# Patient Record
Sex: MALE | Race: WHITE | NOT HISPANIC OR LATINO | Employment: FULL TIME | URBAN - METROPOLITAN AREA
[De-identification: names, ages, dates, MRNs, and addresses within clinical notes are randomized per-mention and may not be internally consistent; named-entity substitution may affect disease eponyms.]

---

## 2017-01-19 ENCOUNTER — GENERIC CONVERSION - ENCOUNTER (OUTPATIENT)
Dept: OTHER | Facility: OTHER | Age: 37
End: 2017-01-19

## 2017-01-25 ENCOUNTER — GENERIC CONVERSION - ENCOUNTER (OUTPATIENT)
Dept: OTHER | Facility: OTHER | Age: 37
End: 2017-01-25

## 2017-02-24 ENCOUNTER — GENERIC CONVERSION - ENCOUNTER (OUTPATIENT)
Dept: OTHER | Facility: OTHER | Age: 37
End: 2017-02-24

## 2018-01-12 NOTE — MISCELLANEOUS
Message  Return to work or school:   Guevara Hawthorne is under my professional care  He was seen in my office on 01/28/2016       Excused for dates of 01/25/16, 01/27/16, 01/28/16          Signatures   Electronically signed by : lCayton Tamez, ; Jan 28 2016  2:08PM EST                       (/Recorder)

## 2018-01-12 NOTE — PROGRESS NOTES
Assessment    1  Well adult exam (V70 0) (Z00 00)   2  Allergic rhinitis (477 9) (J30 9)   3  Non morbid obesity due to excess calories (278 00) (E66 09)    Plan  Well adult exam    · Eat a low fat and low cholesterol diet ; Status:Complete;   Done: 81WJB1471 03:17PM   · Some eating tips that can help you lose weight ; Status:Complete;   Done: 34VZU8339  03:17PM    Discussion/Summary  Impression: health maintenance visit  Currently, he eats a poor diet and has an inadequate exercise regimen  Prostate cancer screening: the risks and benefits of prostate cancer screening were discussed and PSA is not indicated  Testicular cancer screening: the risks and benefits of testicular cancer screening were discussed and monthly self testicular exam was advised  Colorectal cancer screening: the risks and benefits of colorectal cancer screening were discussed and start colorectal screening at age 48  Screening lab work includes Patient had blood work in 1/14  The risks and benefits of immunizations were discussed and recommended Tdap vaccination  patient will check with insurance regarding coverage  Advice and education were given regarding nutrition, aerobic exercise, weight loss, calcium supplements, vitamin D supplements, cardiovascular risk reduction, self skin examination and seat belt use  Patient discussion: discussed with the patient  1 Well adult PE  2 Allergic rhinitis - symptoms are stable, cont  Singulair 10 mg at bedtime  3 Obesity - recommended to follow a low-fat, low-cholesterol diet  Start regular exercise  Encouraged weight reduction  Chief Complaint  Patient is here for a work physical exam       History of Present Illness  HM, Adult Male: The patient is being seen for a health maintenance evaluation  The last health maintenance visit was 2 year(s) ago  Social History: Household members include 1 daughter(s) and girlfrend  He is unmarried  Work status: working full time   The patient has never smoked cigarettes  He reports occasional alcohol use  He has never used illicit drugs  General Health: The patient's health since the last visit is described as good  He has regular dental visits  He denies vision problems  He denies hearing loss  Immunizations status: not up to date The patient needs the following immunization(s): Tdap  Lifestyle:  He does not have a healthy diet  He has weight concerns  Weight control issues: obesity  He does not exercise regularly  Reproductive health:  the patient is sexually active  Screening: Testicular cancer screening includes monthly self testicular examinations  Metabolic screening includes lipid profile performed 1/14 and glucose screening performed 1/14  risk screening reviewed and current  HPI: Patient presents for well adult physical exam      He has history of allergic rhinitis, currently takes Singular 10 mg at bedtime  Denies nasal congestion, PND  No SOB, no cough  Patient tries to lose weight  He does not exercise on a regular basis  Takes multivitamin one tablet daily  Blood work done in 1/14: BMP, lipid profile - WNL  Denies tobacco use  Review of Systems    Constitutional: no fever, no chills and not feeling tired  Wt  has been stable  Eyes: no eye pain, no eyesight problems, no dryness of the eyes, eyes not red, no purulent discharge from the eyes and no itching of the eyes  ENT: no earache, no nosebleeds, no sore throat, no hearing loss, no nasal discharge and no hoarseness  Cardiovascular: no chest pain, no intermittent leg claudication, no palpitations and no extremity edema  Respiratory: no shortness of breath, no cough, no wheezing and no shortness of breath during exertion  Gastrointestinal: no abdominal pain, no nausea, no vomiting, no constipation, no diarrhea and no blood in stools  Genitourinary: no dysuria, no incontinence, no nocturia and no testicular pain     Musculoskeletal: no arthralgias, no joint swelling, no myalgias and no joint stiffness  Integumentary: no rashes, no itching and no skin wound  Neurological: no headache, no numbness, no tingling, no dizziness, no convulsions and no fainting  Psychiatric: sleep disturbances, but no anxiety and no depression  Endocrine: no muscle weakness  Hematologic/Lymphatic: No complaints of swollen glands, no swollen glands in the neck, does not bleed easily, no easy bruising  Active Problems    1  Allergic rhinitis (477 9) (J30 9)   2  Blood pressure elevated (796 2) (I10)   3  Fatty liver (571 8) (K76 0)   4  Heartburn (787 1) (R12)   5  Insomnia (780 52) (G47 00)    Past Medical History    · History of Acute upper respiratory infection (465 9) (J06 9)   · History of Bronchospasm (519 11) (J98 01)   · History of acute bronchitis (V12 69) (Z87 09)   · History of acute sinusitis (V12 69) (Z87 09)   · History of acute sinusitis (V12 69) (Z87 09)   · History of influenza (V12 09) (Z87 09)   · History of viral gastroenteritis (V12 09) (Z86 19)   · History of viral gastroenteritis (V12 09) (Z86 19)   · History of viral infection (V12 09) (Z86 19)    Surgical History    · History of Myringotomy - With Ventilating Tube Insertion    Family History    · Family history of Diabetes (250 00) (E11 9)   · Family history of High blood pressure (401 9) (I10)    · Family history of alcoholism (V17 0) (Z81 1)    Social History    · Being A Social Drinker   · Never A Smoker    Current Meds   1  Melatonin 5 MG Oral Tablet; TAKE 1 TABLET Bedtime PRN; Therapy: 33ODS5182 to (Evaluate:50Pke3330); Last Rx:28Jan2016 Ordered   2  Montelukast Sodium 10 MG Oral Tablet; take 1 tablet by mouth every day; Therapy: 81RDT6910 to (Evaluate:00Wqm6075)  Requested for: 28XAZ8216; Last   Rx:29Sep2015 Ordered   3  Multivital Oral Tablet; take 1 tablet by mouth daily; Therapy: 20QLG5924 to Recorded   4   ProAir  (90 Base) MCG/ACT Inhalation Aerosol Solution; INHALE 1 TO 2 PUFFS   EVERY 4 TO 6 HOURS AS NEEDED; Therapy: 27MXM3358 to (Nuzhat Molina)  Requested for: 93QHD5198; Last   Rx:22Oqm0761 Ordered    Allergies    1  No Known Drug Allergies    Vitals   Recorded: 61BSM9911 02:54PM Recorded: 13EXH8593 02:27PM   Temperature  98 1 F   Heart Rate  89   Respiration  18   Systolic 344 613   Diastolic 82 80   Height  6 ft    Weight  248 lb    BMI Calculated  33 63   BSA Calculated  2 33   O2 Saturation  97   Pain Scale  0     Physical Exam    Constitutional   General appearance: No acute distress, well appearing and well nourished  Obese  Head and Face   Head and face: Normal     Palpation of the face and sinuses: No sinus tenderness  Eyes   Conjunctiva and lids: No erythema, swelling or discharge  Pupils and irises: Equal, round, reactive to light  Ears, Nose, Mouth, and Throat   External inspection of ears and nose: Normal     Otoscopic examination: Tympanic membranes translucent with normal light reflex  Canals patent without erythema  Hearing: Normal     Nasal mucosa, septum, and turbinates: Normal without edema or erythema  Lips, teeth, and gums: Abnormal   missing teeth  Oropharynx: Normal with no erythema, edema, exudate or lesions  Neck   Neck: Supple, symmetric, trachea midline, no masses  Pulmonary   Respiratory effort: No increased work of breathing or signs of respiratory distress  Auscultation of lungs: Clear to auscultation  Cardiovascular   Auscultation of heart: Normal rate and rhythm, normal S1 and S2, no murmurs  Carotid pulses: 2+ bilaterally  no carotid bruits  Abdominal aorta: Normal   no abdominal bruits  Examination of extremities for edema and/or varicosities: Normal     Abdomen   Abdomen: Non-tender, no masses  Liver and spleen: No hepatomegaly or splenomegaly  Lymphatic   Palpation of lymph nodes in neck: No lymphadenopathy      Musculoskeletal   Gait and station: Normal     Inspection/palpation of digits and nails: Normal without clubbing or cyanosis  Inspection/palpation of joints, bones, and muscles: Normal     Skin   Skin and subcutaneous tissue: Normal without rashes or lesions  Neurologic   Cranial nerves: Cranial nerves 2-12 intact  Sensation: No sensory loss      Psychiatric   Judgment and insight: Normal     Mood and affect: Normal        Signatures   Electronically signed by : LALITO Mcqueen ; Jan 29 2016  3:18PM EST                       (Author)

## 2018-01-13 NOTE — PROGRESS NOTES
Assessment    1  Viral gastroenteritis (008 8) (A08 4)   2  Insomnia (780 52) (G47 00)    Plan  Allergic rhinitis    · Montelukast Sodium 10 MG Oral Tablet; take 1 tablet by mouth every day  Bronchospasm    · ProAir  (90 Base) MCG/ACT Inhalation Aerosol Solution; INHALE 1  TO 2 PUFFS EVERY 4 TO 6 HOURS AS NEEDED  Insomnia    · Melatonin 5 MG Oral Tablet; TAKE 1 TABLET Bedtime PRN  Viral gastroenteritis    · Avoid dairy products ; Status:Complete;   Done: 91IKX4090   · Avoid foods and beverages that contain caffeine ; Status:Complete;   Done: 40BIG5281   · Drink at  least 6 glasses of clear liquids a day ; Status:Complete;   Done: 89ZCB5206   · Good handwashing is one of the best ways to control the spread of germs ;  Status:Complete;   Done: 48RCW2811   · We suggest that you follow a special diet until your diarrhea is better ; Status:Complete;    Done: 50LCW5138   · You may slowly resume your normal level of activity once you feel better ;  Status:Complete;   Done: 28EKD3850   · Seek Immediate Medical Attention if: You become dehydrated ; Status:Complete;   Done:  89ULN7985   · Seek Immediate Medical Attention if: You faint or lose consciousness ; Status:Complete;    Done: 60WOM3209   · Seek Immediate Medical Attention if: You have pain in your abdomen ; Status:Complete;    Done: 53FKN4717   · Seek Immediate Medical Attention if: You see any blood in the stool ; Status:Complete;    Done: 04EFS3692    Discussion/Summary    Viral gastroenteritis: This seems to have resolved  He should try to follow a BRATTY diet for the next few days  Avoid spicy/fried foods, caffeine, dairy products  Lots of fluids and rest encouraged  He will let us know if any diarrhea and/or n/v return  Insomnia: Try to practice proper bedtime habits  Limit use of TV/phone right before bed  Make sure room is cool and dark  Avoid caffeine and alcohol before bed  Don't exercise before bed   Try relaxing activities before bed such as reading  Get out of bed if unable to fall asleep within 20 minutes  May try increasing Melatonin to 5mg nightly  Will let us know if he continues to have issues with sleeping  At the end of the appointment, the patient mentioned that he also needs a routine physical  He is going to try and schedule that for next week with Dr Manuel Treviño who he normally sees  Possible side effects of new medications were reviewed with the patient/guardian today  The treatment plan was reviewed with the patient/guardian  The patient/guardian understands and agrees with the treatment plan      Chief Complaint  Patient is here complaining of diarrhea, nausea, no vomiting, left side stomach pain that started on last Sunday  History of Present Illness  HPI: C/o diarrhea, minor nausea and vomiting for past 3 days  Has since resolved as of yesterday  Last episode of diarrhea was yesterday morning  Is feeling better overall  Has diarrhea about about 4 times per day  Feels a little weak and slightly dehydrated now, but is improving  Is eating a regular diet  Denies fevers or chills  Denies abdominal pain, blood in stools  No sore throat, cough, nasal congestion, ear pain  No SOB or wheezing  No recent travel  No raw, unwashed foods recently  No known sick contacts recently     Non-smoker  Insomnia- Has trouble falling asleep mostly but also some issues with waking up throughout night  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  Has been going on for the past few months  Tries to relax before bed, has tried melatonin 3mg with only minor relief  Review of Systems    Constitutional: as noted in HPI    ENT: as noted in HPI  Cardiovascular: no chest pain, no palpitations and no lower extremity edema  Respiratory: as noted in HPI  Gastrointestinal: as noted in HPI  Musculoskeletal: no arthralgias and no myalgias  Integumentary: no rashes  Neurological: no headache       ROS reviewed  Active Problems    1  Acute sinusitis (461 9) (J01 90)   2  Allergic rhinitis (477 9) (J30 9)   3  Blood pressure elevated (796 2) (I10)   4  Bronchospasm (519 11) (J98 01)   5  Fatty liver (571 8) (K76 0)   6  Heartburn (787 1) (R12)    Past Medical History    1  History of Acute upper respiratory infection (465 9) (J06 9)   2  History of acute bronchitis (V12 69) (Z87 09)   3  History of acute sinusitis (V12 69) (Z87 09)   4  History of influenza (V12 09) (Z87 09)   5  History of viral gastroenteritis (V12 09) (Z86 19)   6  History of viral infection (V12 09) (Z86 19)  Active Problems And Past Medical History Reviewed: The active problems and past medical history were reviewed and updated today  Family History    1  Family history of Diabetes (250 00) (E11 9)   2  Family history of High blood pressure (401 9) (I10)    3  Family history of alcoholism (V17 0) (Z81 1)    Social History    · Being A Social Drinker   · Never A Smoker  The social history was reviewed and updated today  The social history was reviewed and is unchanged  Surgical History    1  History of Myringotomy - With Ventilating Tube Insertion    Current Meds   1  Montelukast Sodium 10 MG Oral Tablet; take 1 tablet by mouth every day; Therapy: 88EQA7562 to (Evaluate:97Cvq8421)  Requested for: 77ZEM5686; Last   Rx:72Abj1992 Ordered   2  ProAir  (90 Base) MCG/ACT Inhalation Aerosol Solution; INHALE 1 TO 2 PUFFS   EVERY 4 TO 6 HOURS AS NEEDED; Therapy: 19BTQ7107 to (Evaluate:59Juo8761)  Requested for: 22BVL4012; Last   Rx:56Dqj7059 Ordered    The medication list was reviewed and updated today  Allergies    1   No Known Drug Allergies    Vitals   Recorded: 26MJA8021 01:34PM   Temperature 98 3 F   Heart Rate 85   Respiration 16   Systolic 866   Diastolic 80   Height 6 ft    Weight 247 lb 6 08 oz   BMI Calculated 33 55   BSA Calculated 2 33   O2 Saturation 96   Pain Scale 4     Physical Exam    Constitutional General appearance: No acute distress, well appearing and well nourished  Eyes   Conjunctiva and lids: No swelling, erythema, or discharge  Conjunctiva Findings: normal in both eyes  Pupils and irises: Equal, round and reactive to light  Ears, Nose, Mouth, and Throat   External inspection of ears and nose: Normal     Otoscopic examination: Tympanic membrance translucent with normal light reflex  Canals patent without erythema  Nasal mucosa, septum, and turbinates: Normal without edema or erythema  Oropharynx: Normal with no erythema, edema, exudate or lesions  The tongue was normal  Oral membrane pink and moist    Pulmonary   Respiratory effort: No increased work of breathing or signs of respiratory distress  Auscultation of lungs: Clear to auscultation, equal breath sounds bilaterally, no wheezes, no rales, no rhonci  Cardiovascular   Auscultation of heart: Normal rate and rhythm, normal S1 and S2, without murmurs  Examination of extremities for edema and/or varicosities: Normal     Carotid pulses: Normal     Abdomen   Abdomen: Non-tender, no masses  The abdomen was rounded  Bowel sounds were normal  The abdomen was soft and nontender  The abdomen was not firm and not rigid  No rebound tenderness  No guarding  There was a negative Tanner's sign, negative Rovsing's sign, negative obturator sign and negative psoas sign  no masses palpated  The abdomen was normal to percussion  no CVA tenderness   Liver and spleen: No hepatomegaly or splenomegaly  Lymphatic   Palpation of lymph nodes in neck: No lymphadenopathy  Musculoskeletal   Digits and nails: Normal without clubbing or cyanosis  Skin   Skin and subcutaneous tissue: Normal without rashes or lesions  Normal skin turgor, no tenting  Attending Note  Collaborating Physician Note: Collaborating Note: I agree with the Advanced Practitioner note        Future Appointments    Date/Time Provider Specialty Site   01/29/2016 02:30 PM LALITO Whiting  Family Medicine [facility]     Signatures   Electronically signed by : FITO Ernandez; Jan 28 2016  2:22PM EST                       (Author)    Electronically signed by :  Trini Olivarez MD; Jan 28 2016  4:53PM EST                       (Author)

## 2018-01-15 ENCOUNTER — HOSPITAL ENCOUNTER (EMERGENCY)
Facility: HOSPITAL | Age: 38
Discharge: HOME | End: 2018-01-16
Attending: EMERGENCY MEDICINE | Admitting: EMERGENCY MEDICINE

## 2018-01-15 ENCOUNTER — APPOINTMENT (EMERGENCY)
Dept: RADIOLOGY | Facility: HOSPITAL | Age: 38
End: 2018-01-15

## 2018-01-15 DIAGNOSIS — R79.89 ELEVATED LFTS: ICD-10-CM

## 2018-01-15 DIAGNOSIS — K21.9 GERD (GASTROESOPHAGEAL REFLUX DISEASE): Primary | ICD-10-CM

## 2018-01-15 DIAGNOSIS — R73.9 ELEVATED BLOOD SUGAR: ICD-10-CM

## 2018-01-15 LAB
ALBUMIN SERPL BCP-MCNC: 3.9 G/DL (ref 3.5–5)
ALP SERPL-CCNC: 70 U/L (ref 46–116)
ALT SERPL W P-5'-P-CCNC: 131 U/L (ref 12–78)
ANION GAP SERPL CALCULATED.3IONS-SCNC: 8 MMOL/L (ref 4–13)
AST SERPL W P-5'-P-CCNC: 57 U/L (ref 5–45)
BASOPHILS # BLD AUTO: 0.03 THOUSANDS/ΜL (ref 0–0.1)
BASOPHILS NFR BLD AUTO: 0 % (ref 0–1)
BILIRUB SERPL-MCNC: 0.62 MG/DL (ref 0.2–1)
BUN SERPL-MCNC: 16 MG/DL (ref 5–25)
CALCIUM SERPL-MCNC: 9.1 MG/DL (ref 8.3–10.1)
CHLORIDE SERPL-SCNC: 103 MMOL/L (ref 100–108)
CO2 SERPL-SCNC: 25 MMOL/L (ref 21–32)
CREAT SERPL-MCNC: 0.8 MG/DL (ref 0.6–1.3)
DEPRECATED D DIMER PPP: <220 NG/ML (FEU) (ref 0–424)
EOSINOPHIL # BLD AUTO: 0.1 THOUSAND/ΜL (ref 0–0.61)
EOSINOPHIL NFR BLD AUTO: 1 % (ref 0–6)
ERYTHROCYTE [DISTWIDTH] IN BLOOD BY AUTOMATED COUNT: 13 % (ref 11.6–15.1)
GFR SERPL CREATININE-BSD FRML MDRD: 113 ML/MIN/1.73SQ M
GLUCOSE SERPL-MCNC: 143 MG/DL (ref 65–140)
HCT VFR BLD AUTO: 42.5 % (ref 36.5–49.3)
HGB BLD-MCNC: 15.3 G/DL (ref 12–17)
LYMPHOCYTES # BLD AUTO: 2.9 THOUSANDS/ΜL (ref 0.6–4.47)
LYMPHOCYTES NFR BLD AUTO: 30 % (ref 14–44)
MCH RBC QN AUTO: 29.5 PG (ref 26.8–34.3)
MCHC RBC AUTO-ENTMCNC: 36 G/DL (ref 31.4–37.4)
MCV RBC AUTO: 82 FL (ref 82–98)
MONOCYTES # BLD AUTO: 0.88 THOUSAND/ΜL (ref 0.17–1.22)
MONOCYTES NFR BLD AUTO: 9 % (ref 4–12)
NEUTROPHILS # BLD AUTO: 5.87 THOUSANDS/ΜL (ref 1.85–7.62)
NEUTS SEG NFR BLD AUTO: 60 % (ref 43–75)
NRBC BLD AUTO-RTO: 0 /100 WBCS
PLATELET # BLD AUTO: 271 THOUSANDS/UL (ref 149–390)
PMV BLD AUTO: 10.6 FL (ref 8.9–12.7)
POTASSIUM SERPL-SCNC: 3.5 MMOL/L (ref 3.5–5.3)
PROT SERPL-MCNC: 8.3 G/DL (ref 6.4–8.2)
RBC # BLD AUTO: 5.18 MILLION/UL (ref 3.88–5.62)
SODIUM SERPL-SCNC: 136 MMOL/L (ref 136–145)
WBC # BLD AUTO: 9.79 THOUSAND/UL (ref 4.31–10.16)

## 2018-01-15 PROCEDURE — 36415 COLL VENOUS BLD VENIPUNCTURE: CPT | Performed by: EMERGENCY MEDICINE

## 2018-01-15 PROCEDURE — 85025 COMPLETE CBC W/AUTO DIFF WBC: CPT | Performed by: EMERGENCY MEDICINE

## 2018-01-15 PROCEDURE — 80053 COMPREHEN METABOLIC PANEL: CPT | Performed by: EMERGENCY MEDICINE

## 2018-01-15 PROCEDURE — 85379 FIBRIN DEGRADATION QUANT: CPT | Performed by: EMERGENCY MEDICINE

## 2018-01-15 PROCEDURE — 71046 X-RAY EXAM CHEST 2 VIEWS: CPT

## 2018-01-15 RX ORDER — MAGNESIUM HYDROXIDE/ALUMINUM HYDROXICE/SIMETHICONE 120; 1200; 1200 MG/30ML; MG/30ML; MG/30ML
30 SUSPENSION ORAL ONCE
Status: COMPLETED | OUTPATIENT
Start: 2018-01-15 | End: 2018-01-15

## 2018-01-15 RX ORDER — FAMOTIDINE 20 MG/1
20 TABLET, FILM COATED ORAL 2 TIMES DAILY
Status: DISCONTINUED | OUTPATIENT
Start: 2018-01-16 | End: 2018-01-15

## 2018-01-15 RX ORDER — ALBUTEROL SULFATE 90 UG/1
2 AEROSOL, METERED RESPIRATORY (INHALATION) EVERY 6 HOURS PRN
COMMUNITY
End: 2021-08-14 | Stop reason: ALTCHOICE

## 2018-01-15 RX ORDER — DIPHENOXYLATE HYDROCHLORIDE AND ATROPINE SULFATE 2.5; .025 MG/1; MG/1
1 TABLET ORAL DAILY
COMMUNITY

## 2018-01-15 RX ORDER — FAMOTIDINE 20 MG/1
20 TABLET, FILM COATED ORAL 2 TIMES DAILY
Qty: 30 TABLET | Refills: 0 | Status: SHIPPED | OUTPATIENT
Start: 2018-01-15 | End: 2019-04-08

## 2018-01-15 RX ADMIN — ALUMINUM HYDROXIDE, MAGNESIUM HYDROXIDE, AND SIMETHICONE 30 ML: 200; 200; 20 SUSPENSION ORAL at 22:10

## 2018-01-15 RX ADMIN — LIDOCAINE HYDROCHLORIDE 15 ML: 20 SOLUTION ORAL; TOPICAL at 22:10

## 2018-01-16 VITALS
DIASTOLIC BLOOD PRESSURE: 92 MMHG | WEIGHT: 255 LBS | OXYGEN SATURATION: 98 % | BODY MASS INDEX: 34.58 KG/M2 | SYSTOLIC BLOOD PRESSURE: 143 MMHG | HEART RATE: 96 BPM | TEMPERATURE: 98.6 F | RESPIRATION RATE: 16 BRPM

## 2018-01-16 PROCEDURE — 99285 EMERGENCY DEPT VISIT HI MDM: CPT

## 2018-01-16 NOTE — ED ATTENDING ATTESTATION
Mindy Magana DO, saw and evaluated the patient  I have discussed the patient with the resident/non-physician practitioner and agree with the resident's/non-physician practitioner's findings, Plan of Care, and MDM as documented in the resident's/non-physician practitioner's note, except where noted  All available labs and Radiology studies were reviewed  At this point I agree with the current assessment done in the Emergency Department  I have conducted an independent evaluation of this patient a history and physical is as follows:    Patient complains of subjective fever, sore throat, dry cough, myalgias, arthralgias, general malaise  He describes feeling short of breath when he is coughing due to a sensation of mucus in his throat  He had 1 day of nausea and vomiting with chills that resolved spontaneously  No recent travel or  sick contacts w/similar symptoms  ROS: Denies HA, neck pain/stiffness, CP, abdominal pain, diarrhea or dysuria  12 system ROS o/w negative  PE: NAD, appears comfortable, alert; PERRL, EOMI; MMM, no posterior oropharyngeal exudate, edema or erythema; HRR, mildly tachycardic, no murmur; lungs CTA w/o w/r/r, POx 98% on RA (nl); abdomen s/nt/nd, nl BS in all 4 quadrants; (-) LE edema, FROM extremities x4; skin p/w/d  DDx:  Cough/chills -  viral syndrome, flu, less likely DVT/PE, doubt ACS/MI, pneumonia  A/P: Will check CBC, CMP and D-dimer, CXR, treat symptoms, re-evaluate for further workup and disposition      Critical Care Time  CritCare Time    Procedures

## 2018-01-16 NOTE — ED CARE HANDOFF
Emergency Department Sign Out Note        Sign out and transfer of care from Dr Lisa Raymond  See Separate Emergency Department note  The patient, Michele Galo, was evaluated by the previous provider for cough, sob, and dry heaving  Workup Completed:  CBC, CMP, C-Cimer, CXR      ED Course as of Dennys 16 0000   Mon Dennys 15, 2018   2232 Received the patient in sign out from Dr Beltran Aw  He reports that the patient presented with a cough, shortness of breath, and dry heaves  Workup so far has been negative, patient was given a GI cocktail and a D-dimer is pending  2316 F/U GI cocktail, pt unsure if it helped but says he has not had another episode since taking it  He described his episodes as having a sensation of gagging, and then coughing on saliva, followed by dry heaving  He says this can be brought on by pushing in his epigastric area  2358 No recurrence of sx  HR decreased to 97 at time of discharge  Pt reports hx of fatty liver and elevated BS in the past     2359 D-DIMER QUANTITATIVE: <220     Procedures  MDM  CritCare Time      Disposition  Final diagnoses:   GERD (gastroesophageal reflux disease)   Elevated LFTs   Elevated blood sugar     Time reflects when diagnosis was documented in both MDM as applicable and the Disposition within this note     Time User Action Codes Description Comment    1/15/2018 11:18 PM Cortes Weiner [K21 9] GERD (gastroesophageal reflux disease)     1/15/2018 11:18 PM Brittni Petit Add [R79 89] Elevated LFTs     1/15/2018 11:19 PM Argenis Weiner Add [R73 9] Elevated blood sugar       ED Disposition     ED Disposition Condition Comment    Discharge  Michele Galo discharge to home/self care      Condition at discharge: Good        Follow-up Information     Follow up With Specialties Details Why Contact Info    [facility] [address]       [facility]   [address] [address] [phone] Patient's Medications   Discharge Prescriptions    FAMOTIDINE (PEPCID) 20 MG TABLET    Take 1 tablet by mouth 2 (two) times a day       Start Date: 1/15/2018 End Date: --       Order Dose: 20 mg       Quantity: 30 tablet    Refills: 0     No discharge procedures on file         ED Provider  Electronically Signed by     Chaz Vela MD  01/16/18 0001

## 2018-01-16 NOTE — ED PROVIDER NOTES
History  Chief Complaint   Patient presents with    Shortness of Breath     pt with some shortness of breath thinks that maybe he just had the flu and still has cough and shortness of breath feeling and some MUSE symptoms      Patient is a 43 yo M with pmhx of GERD and asthma who presents for cough and SOB  Patient says that on Thursday he began to have nausea, vomiting, fevers and chills  He says that he was unable to hold anything down  He says that those symptoms have since improved, however, he has now developed a dry cough and feeling in his throat as if "there is a lid in his throat" preventing him from taking deep breaths  He says that he feels like he has "mucus in his throat" and he cant take deep breaths and develops "dry heaves " He admits to some chest tightness that is not pleuritic in nature  He also says that he has been having increasing epigastric burning discomfort and GERD-like symptoms over the past few days  He has been taking over the counter medications for it  Patient denies lightheadedness, dizziness, nasal congestion, sore throat, abdominal pain or urinary symptoms  Prior to Admission Medications   Prescriptions Last Dose Informant Patient Reported? Taking? albuterol (PROVENTIL HFA,VENTOLIN HFA) 90 mcg/act inhaler 1/15/2018 at 1700  Yes Yes   Sig: Inhale 2 puffs every 6 (six) hours as needed for wheezing   multivitamin (THERAGRAN) TABS 1/15/2018 at Unknown time  Yes Yes   Sig: Take 1 tablet by mouth daily      Facility-Administered Medications: None       Past Medical History:   Diagnosis Date    Asthma        History reviewed  No pertinent surgical history  History reviewed  No pertinent family history  I have reviewed and agree with the history as documented  Social History   Substance Use Topics    Smoking status: Never Smoker    Smokeless tobacco: Never Used    Alcohol use No        Review of Systems   Constitutional: Positive for fever   Negative for chills and unexpected weight change  HENT: Positive for voice change  Negative for congestion, sore throat and trouble swallowing  Eyes: Negative for pain, discharge and itching  Respiratory: Positive for cough, chest tightness and shortness of breath  Negative for wheezing  Cardiovascular: Negative for chest pain, palpitations and leg swelling  Gastrointestinal: Negative for abdominal pain, blood in stool, diarrhea, nausea and vomiting  Endocrine: Negative for polyuria  Genitourinary: Negative for difficulty urinating, dysuria, frequency and hematuria  Musculoskeletal: Negative for arthralgias and back pain  Skin: Negative for color change and rash  Neurological: Negative for dizziness, syncope, weakness, light-headedness and headaches  Physical Exam  ED Triage Vitals   Temperature Pulse Respirations Blood Pressure SpO2   01/15/18 1852 01/15/18 1852 01/15/18 1852 01/15/18 1852 01/15/18 1852   98 6 °F (37 °C) (!) 110 18 (!) 171/102 97 %      Temp Source Heart Rate Source Patient Position - Orthostatic VS BP Location FiO2 (%)   01/15/18 1852 01/15/18 2252 01/15/18 2252 01/15/18 2252 --   Tympanic Monitor Sitting Left arm       Pain Score       01/15/18 1852       8           Orthostatic Vital Signs  Vitals:    01/15/18 1852 01/15/18 2110 01/15/18 2252 01/16/18 0000   BP: (!) 171/102 (!) 178/95 (!) 172/105 143/92   Pulse: (!) 110 (!) 110 102 96   Patient Position - Orthostatic VS:   Sitting        Physical Exam   Constitutional: He is oriented to person, place, and time  He appears well-developed and well-nourished  No distress  HENT:   Head: Normocephalic and atraumatic  Eyes: Conjunctivae and EOM are normal  Pupils are equal, round, and reactive to light  Neck: Neck supple  Cardiovascular: Normal heart sounds  Tachycardia present  Exam reveals no gallop and no friction rub  No murmur heard  Pulmonary/Chest: Effort normal and breath sounds normal  No respiratory distress   He has no wheezes  He has no rales  Abdominal: Soft  He exhibits no mass  There is tenderness (epigastric )  There is no guarding  Musculoskeletal: He exhibits no edema or deformity  Lymphadenopathy:     He has no cervical adenopathy  Neurological: He is alert and oriented to person, place, and time  Skin: Skin is warm and dry  Psychiatric: He has a normal mood and affect  His behavior is normal    Nursing note and vitals reviewed  ED Medications  Medications   lidocaine viscous (XYLOCAINE) 2 % mucosal solution 15 mL (15 mL Swish & Spit Given 1/15/18 2210)   aluminum-magnesium hydroxide-simethicone (MYLANTA) 200-200-20 mg/5 mL oral suspension 30 mL (30 mL Oral Given 1/15/18 2210)       Diagnostic Studies  Results Reviewed     Procedure Component Value Units Date/Time    D-Dimer [[id number]]  (Normal) Collected:  01/15/18 2239    Lab Status:  Final result Specimen:  Blood from Arm, Right Updated:  01/15/18 2346     D-Dimer, Quant <220 ng/ml (FEU)     Comprehensive metabolic panel [[id number]]  (Abnormal) Collected:  01/15/18 2239    Lab Status:  Final result Specimen:  Blood from Arm, Right Updated:  01/15/18 2312     Sodium 136 mmol/L      Potassium 3 5 mmol/L      Chloride 103 mmol/L      CO2 25 mmol/L      Anion Gap 8 mmol/L      BUN 16 mg/dL      Creatinine 0 80 mg/dL      Glucose 143 (H) mg/dL      Calcium 9 1 mg/dL      AST 57 (H) U/L       (H) U/L      Alkaline Phosphatase 70 U/L      Total Protein 8 3 (H) g/dL      Albumin 3 9 g/dL      Total Bilirubin 0 62 mg/dL      eGFR 113 ml/min/1 73sq m     Narrative:         National Kidney Disease Education Program recommendations are as follows:  GFR calculation is accurate only with a steady state creatinine  Chronic Kidney disease less than 60 ml/min/1 73 sq  meters  Kidney failure less than 15 ml/min/1 73 sq  meters      CBC and differential [[id number]]  (Normal) Collected:  01/15/18 2239    Lab Status:  Final result Specimen:  Blood from Arm, Right Updated:  01/15/18 2257     WBC 9 79 Thousand/uL      RBC 5 18 Million/uL      Hemoglobin 15 3 g/dL      Hematocrit 42 5 %      MCV 82 fL      MCH 29 5 pg      MCHC 36 0 g/dL      RDW 13 0 %      MPV 10 6 fL      Platelets 047 Thousands/uL      nRBC 0 /100 WBCs      Neutrophils Relative 60 %      Lymphocytes Relative 30 %      Monocytes Relative 9 %      Eosinophils Relative 1 %      Basophils Relative 0 %      Neutrophils Absolute 5 87 Thousands/µL      Lymphocytes Absolute 2 90 Thousands/µL      Monocytes Absolute 0 88 Thousand/µL      Eosinophils Absolute 0 10 Thousand/µL      Basophils Absolute 0 03 Thousands/µL                  XR chest 2 views   Final Result by Matthias Ramos MD (01/15 2200)      No active pulmonary disease  Workstation performed: [id number]               Procedures  Procedures      Phone Consults  ED Phone Contact    ED Course  ED Course                                MDM  Number of Diagnoses or Management Options  Diagnosis management comments: 43 yo M w/ history of GERD and asthma presenting for cough and sob  No fevers, chills  No cardiac history  Non-wheezy on exam   No history of PE or current leg swelling  Patient tachycardic  Patient does not PERC out due to tachycardia  Epigastric discomfort and GERD symptoms along with dry cough    Plan: GI cocktail, CXR, CBC, CMP, D-dimer        CritCare Time    Disposition  Final diagnoses:   GERD (gastroesophageal reflux disease)   Elevated LFTs   Elevated blood sugar     Time reflects when diagnosis was documented in both MDM as applicable and the Disposition within this note     Time User Action Codes Description Comment    1/15/2018 11:18 PM Elle Weiner [K21 [id number]] GERD (gastroesophageal reflux disease)     1/15/2018 11:18 PM Lesley Powers [[id number]] Elevated LFTs     1/15/2018 11:19 PM Elle Weiner [[id number]] Elevated blood sugar       ED Disposition     ED Disposition Condition Comment    Discharge  Maggie Everett discharge to home/self care  Condition at discharge: Good        Follow-up Information     Follow up With Specialties Details Why [address] [facility]   [address]  [phone]        Discharge Medication List as of 1/15/2018 11:53 PM      START taking these medications    Details   famotidine (PEPCID) 20 mg tablet Take 1 tablet by mouth 2 (two) times a day, Starting Mon 1/15/2018, Print         CONTINUE these medications which have NOT CHANGED    Details   albuterol (PROVENTIL HFA,VENTOLIN HFA) 90 mcg/act inhaler Inhale 2 puffs every 6 (six) hours as needed for wheezing, Historical Med      multivitamin (THERAGRAN) TABS Take 1 tablet by mouth daily, Historical Med           No discharge procedures on file  ED Provider  Attending physically available and evaluated Joshluis alfredo Miguelpayton BISWAS managed the patient along with the ED Attending      Electronically Signed by         Nabila Frank DO  01/17/18 6719

## 2018-01-16 NOTE — DISCHARGE INSTRUCTIONS
Gastroesophageal Reflux Disease   WHAT YOU NEED TO KNOW:   Gastroesophageal reflux occurs when acid and food in the stomach back up into the esophagus  Gastroesophageal reflux disease (GERD) is reflux that occurs more than twice a week for a few weeks  It usually causes heartburn and other symptoms  GERD can cause other health problems over time if it is not treated  DISCHARGE INSTRUCTIONS:   Return to the emergency department if:   · You feel full and cannot burp or vomit  · You have severe chest pain and sudden trouble breathing  · Your bowel movements are black, bloody, or tarry-looking  · Your vomit looks like coffee grounds or has blood in it  Contact your healthcare provider if:   · You vomit large amounts, or you vomit often  · You have trouble breathing after you vomit  · You have trouble swallowing, or pain with swallowing  · You are losing weight without trying  · Your symptoms get worse or do not improve with treatment  · You have questions or concerns about your condition or care  Medicines:   · Medicines  are used to decrease stomach acid  Medicine may also be used to help your lower esophageal sphincter and stomach contract (tighten) more  · Take your medicine as directed  Contact your healthcare provider if you think your medicine is not helping or if you have side effects  Tell him of her if you are allergic to any medicine  Keep a list of the medicines, vitamins, and herbs you take  Include the amounts, and when and why you take them  Bring the list or the pill bottles to follow-up visits  Carry your medicine list with you in case of an emergency  Manage GERD:   · Do not have foods or drinks that may increase heartburn  These include chocolate, peppermint, fried or fatty foods, drinks that contain caffeine, or carbonated drinks (soda)  Other foods include spicy foods, onions, tomatoes, and tomato-based foods   Do not have foods or drinks that can irritate your esophagus, such as citrus fruits, juices, and alcohol  · Do not eat large meals  When you eat a lot of food at one time, your stomach needs more acid to digest it  Eat 6 small meals each day instead of 3 large ones, and eat slowly  Do not eat meals 2 to 3 hours before bedtime  · Elevate the head of your bed  Place 6-inch blocks under the head of your bed frame  You may also use more than one pillow under your head and shoulders while you sleep  · Maintain a healthy weight  If you are overweight, weight loss may help relieve symptoms of GERD  · Do not smoke  Smoking weakens the lower esophageal sphincter and increases the risk of GERD  Ask your healthcare provider for information if you currently smoke and need help to quit  E-cigarettes or smokeless tobacco still contain nicotine  Talk to your healthcare provider before you use these products  · Do not wear clothing that is tight around your waist   Tight clothing can put pressure on your stomach and cause or worsen GERD symptoms  Follow up with your healthcare provider as directed:  Write down your questions so you remember to ask them during your visits  © 2017 [employer] [facility] Information is for End User's use only and may not be sold, redistributed or otherwise used for commercial purposes  All illustrations and images included in CareNotes® are the copyrighted property of A D A M [employer]  or [employer]  The above information is an  only  It is not intended as medical advice for individual conditions or treatments  Talk to your doctor, nurse or pharmacist before following any medical regimen to see if it is safe and effective for you  Hyperglycemia, Non-Diabetic   WHAT YOU NEED TO KNOW:   Hyperglycemia is a blood glucose (sugar) level that is higher than normal  Hyperglycemia can be short-term, or it can become a long-term condition that leads to diabetes  DISCHARGE INSTRUCTIONS:   Medicines:   You may  need any of the following:  · Hypoglycemic medicine  helps to decrease the amount of sugar in your blood  This medicine helps your body move the sugar to your cells, where it is needed for energy  Your healthcare provider will tell you how often to take this medicine and how long to take it  · Insulin  helps to decrease blood sugar levels  You may need 1 or more shots of insulin each day  You or a family member will be taught how to give the insulin shots  Your healthcare provider will tell you how often you need to inject insulin each day  He will also tell you how long you will need to take it  · Take your medicine as directed  Contact your healthcare provider if you think your medicine is not helping or if you have side effects  Tell him of her if you are allergic to any medicine  Keep a list of the medicines, vitamins, and herbs you take  Include the amounts, and when and why you take them  Bring the list or the pill bottles to follow-up visits  Carry your medicine list with you in case of an emergency  Follow up with your healthcare provider as directed: You may need to return for more tests  Your healthcare provider may also need to refer you to a specialist, such as a dietitian  Write down your questions so you remember to ask them during your visits  Manage hyperglycemia:  The following may help keep your blood sugar at the level recommended by your healthcare provider:  · Get regular exercise  This can help to lower your blood sugar levels  It can also improve your heart health and help you stay at a healthy weight  Get at least 30 minutes of exercise 5 days each week  Ask your healthcare provider about the best exercise plan for you  · Lose weight if you are overweight  Even a small loss of 5% to 10% of your body weight can help to decrease your blood sugar levels  It can also improve your heart health  · Eat healthy foods    Include foods that are high in fiber, such as vegetables, fruits, whole grains, and beans  Also include foods that are low in fat, such as low-fat dairy (milk, yogurt, and cheese), fish, and lean meat  Limit foods that are high in calories and sugar, such as sweet desserts, potato chips, and candy  Limit foods that are high in sodium, such as table salt and salty foods  Your healthcare provider may suggest that you limit carbohydrates to lower your blood sugar levels  · Do not smoke  If you smoke, it is never too late to quit  Smoking can worsen the problems that can occur with hyperglycemia  Ask your healthcare provider for information if you need help quitting  · Limit alcohol  Women should limit alcohol to 1 drink a day  Men should limit alcohol to 2 drinks a day  A drink of alcohol is 12 ounces of beer, 5 ounces of wine, or 1½ ounces of liquor  How to check your blood sugar level: You may need to check your blood sugar level with a blood glucose meter  If you take insulin, you may need to check your blood sugar level at least 3 times each day  Ask your healthcare provider when and how often to check during the day  Ask what your blood sugar levels should be before and after you eat  You may need to check for ketones in your urine or blood if your blood sugar level is high  Write down your results and show them to your healthcare provider  Contact your healthcare provider if:   · You have a fever  · Your blood sugar levels continue to be higher than you were told they should be  · You continue to urinate more often than usual      · You continue to be more thirsty than usual      · You continue to have nausea and vomiting  · You have a wound that has signs of infection, such as redness, swelling, and pus  · You have questions or concerns about your condition or care  Return to the emergency department if:   · Your arm or leg feels warm, tender, and painful  It may look swollen and red      · You feel lightheaded, short of breath, and have chest pain  · You cough up blood  © 2017 [employer] [employer] Information is for End User's use only and may not be sold, redistributed or otherwise used for commercial purposes  All illustrations and images included in CareNotes® are the copyrighted property of A D A M , Inc  or [employer]  The above information is an  only  It is not intended as medical advice for individual conditions or treatments  Talk to your doctor, nurse or pharmacist before following any medical regimen to see if it is safe and effective for you

## 2018-08-03 ENCOUNTER — APPOINTMENT (OUTPATIENT)
Dept: RADIOLOGY | Age: 38
End: 2018-08-03
Payer: OTHER MISCELLANEOUS

## 2018-08-03 ENCOUNTER — TRANSCRIBE ORDERS (OUTPATIENT)
Dept: URGENT CARE | Age: 38
End: 2018-08-03

## 2018-08-03 ENCOUNTER — APPOINTMENT (OUTPATIENT)
Dept: URGENT CARE | Age: 38
End: 2018-08-03
Payer: OTHER MISCELLANEOUS

## 2018-08-03 DIAGNOSIS — S09.90XA INJURY OF HEAD, INITIAL ENCOUNTER: ICD-10-CM

## 2018-08-03 DIAGNOSIS — S09.90XA INJURY OF HEAD, INITIAL ENCOUNTER: Primary | ICD-10-CM

## 2018-08-03 PROCEDURE — 70030 X-RAY EYE FOR FOREIGN BODY: CPT

## 2018-08-03 PROCEDURE — G0382 LEV 3 HOSP TYPE B ED VISIT: HCPCS | Performed by: NURSE PRACTITIONER

## 2018-08-03 PROCEDURE — 99283 EMERGENCY DEPT VISIT LOW MDM: CPT | Performed by: NURSE PRACTITIONER

## 2019-02-24 ENCOUNTER — OFFICE VISIT (OUTPATIENT)
Dept: URGENT CARE | Age: 39
End: 2019-02-24
Payer: COMMERCIAL

## 2019-02-24 VITALS
WEIGHT: 260 LBS | RESPIRATION RATE: 18 BRPM | OXYGEN SATURATION: 97 % | HEART RATE: 88 BPM | DIASTOLIC BLOOD PRESSURE: 97 MMHG | BODY MASS INDEX: 35.26 KG/M2 | TEMPERATURE: 98.6 F | SYSTOLIC BLOOD PRESSURE: 141 MMHG

## 2019-02-24 DIAGNOSIS — J20.9 ACUTE BRONCHITIS, UNSPECIFIED ORGANISM: ICD-10-CM

## 2019-02-24 DIAGNOSIS — J45.901 ASTHMA WITH ACUTE EXACERBATION, UNSPECIFIED ASTHMA SEVERITY, UNSPECIFIED WHETHER PERSISTENT: Primary | ICD-10-CM

## 2019-02-24 DIAGNOSIS — J01.90 ACUTE SINUSITIS, RECURRENCE NOT SPECIFIED, UNSPECIFIED LOCATION: ICD-10-CM

## 2019-02-24 PROCEDURE — 99213 OFFICE O/P EST LOW 20 MIN: CPT | Performed by: PHYSICIAN ASSISTANT

## 2019-02-24 RX ORDER — BROMPHENIRAMINE MALEATE, PSEUDOEPHEDRINE HYDROCHLORIDE, AND DEXTROMETHORPHAN HYDROBROMIDE 2; 30; 10 MG/5ML; MG/5ML; MG/5ML
5 SYRUP ORAL 4 TIMES DAILY PRN
Qty: 120 ML | Refills: 0 | Status: SHIPPED | OUTPATIENT
Start: 2019-02-24 | End: 2019-04-08

## 2019-02-24 RX ORDER — ALBUTEROL SULFATE 90 UG/1
2 AEROSOL, METERED RESPIRATORY (INHALATION) EVERY 4 HOURS PRN
Qty: 1 INHALER | Refills: 0 | Status: SHIPPED | OUTPATIENT
Start: 2019-02-24 | End: 2019-04-08

## 2019-02-24 RX ORDER — PREDNISONE 20 MG/1
20 TABLET ORAL DAILY
Qty: 5 TABLET | Refills: 0 | Status: SHIPPED | OUTPATIENT
Start: 2019-02-24 | End: 2019-03-01

## 2019-02-24 RX ORDER — AMOXICILLIN 500 MG/1
500 CAPSULE ORAL EVERY 12 HOURS SCHEDULED
Qty: 14 CAPSULE | Refills: 0 | Status: SHIPPED | OUTPATIENT
Start: 2019-02-24 | End: 2019-03-03

## 2019-02-24 NOTE — PATIENT INSTRUCTIONS
Take Bromfed DM, prednisone and albuterol as prescribed  Hold amoxicillin until symptomatic for 10-14 days   Fluids and rest  Nasal saline spray  Tylenol/Ibuprofen for pain/fever  Warm compresses over sinuses  Steam treatment (utilize proper safety precautions when in contact with hot water/steam)  Follow up with PCP in 3-5 days  Proceed to  ER if symptoms worsen  Acute Bronchitis   WHAT YOU NEED TO KNOW:   Acute bronchitis is swelling and irritation in the air passages of your lungs  This irritation may cause you to cough or have other breathing problems  Acute bronchitis often starts because of another illness, such as a cold or the flu  The illness spreads from your nose and throat to your windpipe and airways  Bronchitis is often called a chest cold  Acute bronchitis lasts about 3 to 6 weeks and is usually not a serious illness  Your cough can last for several weeks  DISCHARGE INSTRUCTIONS:   Return to the emergency department if:   · You cough up blood  · Your lips or fingernails turn blue  · You feel like you are not getting enough air when you breathe  Contact your healthcare provider if:   · You have a fever  · Your breathing problems do not go away or get worse  · Your cough does not get better within 4 weeks  · You have questions or concerns about your condition or care  Self-care:   · Get more rest   Rest helps your body to heal  Slowly start to do more each day  Rest when you feel it is needed  · Avoid irritants in the air  Avoid chemicals, fumes, and dust  Wear a face mask if you must work around dust or fumes  Stay inside on days when air pollution levels are high  If you have allergies, stay inside when pollen counts are high  Do not use aerosol products, such as spray-on deodorant, bug spray, and hair spray  · Do not smoke or be around others who smoke  Nicotine and other chemicals in cigarettes and cigars damages the cilia that move mucus out of your lungs   Ask your healthcare provider for information if you currently smoke and need help to quit  E-cigarettes or smokeless tobacco still contain nicotine  Talk to your healthcare provider before you use these products  · Drink liquids as directed  Liquids help keep your air passages moist and help you cough up mucus  You may need to drink more liquids when you have acute bronchitis  Ask how much liquid to drink each day and which liquids are best for you  · Use a humidifier or vaporizer  Use a cool mist humidifier or a vaporizer to increase air moisture in your home  This may make it easier for you to breathe and help decrease your cough  Decrease risk for acute bronchitis:   · Get the vaccinations you need  Ask your healthcare provider if you should get vaccinated against the flu or pneumonia  · Prevent the spread of germs  You can decrease your risk of acute bronchitis and other illnesses by doing the following:     [facility] your hands often with soap and water  Carry germ-killing hand lotion or gel with you  You can use the lotion or gel to clean your hands when soap and water are not available  ¨ Do not touch your eyes, nose, or mouth unless you have washed your hands first     ¨ Always cover your mouth when you cough to prevent the spread of germs  It is best to cough into a tissue or your shirt sleeve instead of into your hand  Ask those around you cover their mouths when they cough  ¨ Try to avoid people who have a cold or the flu  If you are sick, stay away from others as much as possible  Medicines: Your healthcare provider may  give you any of the following:  · Ibuprofen or acetaminophen  are medicines that help lower your fever  They are available without a doctor's order  Ask your healthcare provider which medicine is right for you  Ask how much to take and how often to take it  Follow directions  These medicines can cause stomach bleeding if not taken correctly  Ibuprofen can cause kidney damage  Do not take ibuprofen if you have kidney disease, an ulcer, or allergies to aspirin  Acetaminophen can cause liver damage  Do not take more than 4,000 milligrams in 24 hours  · Decongestants  help loosen mucus in your lungs and make it easier to cough up  This can help you breathe easier  · Cough suppressants  decrease your urge to cough  If your cough produces mucus, do not take a cough suppressant unless your healthcare provider tells you to  Your healthcare provider may suggest that you take a cough suppressant at night so you can rest     · Inhalers  may be given  Your healthcare provider may give you one or more inhalers to help you breathe easier and cough less  An inhaler gives your medicine to open your airways  Ask your healthcare provider to show you how to use your inhaler correctly  · Take your medicine as directed  Contact your healthcare provider if you think your medicine is not helping or if you have side effects  Tell him of her if you are allergic to any medicine  Keep a list of the medicines, vitamins, and herbs you take  Include the amounts, and when and why you take them  Bring the list or the pill bottles to follow-up visits  Carry your medicine list with you in case of an emergency  Follow up with your healthcare provider as directed:  Write down questions you have so you will remember to ask them during your follow-up visits  © 2017 [employer] [employer] Information is for End User's use only and may not be sold, redistributed or otherwise used for commercial purposes  All illustrations and images included in CareNotes® are the copyrighted property of [employer]  or [employer]  The above information is an  only  It is not intended as medical advice for individual conditions or treatments  Talk to your doctor, nurse or pharmacist before following any medical regimen to see if it is safe and effective for you

## 2019-02-24 NOTE — PROGRESS NOTES
[facility]        NAME: Miriam Joy is a 43 y o  male  : 1980    MRN: [id number]  DATE: 2019  TIME: 12:16 PM    Assessment and Plan   Asthma with acute exacerbation, unspecified asthma severity, unspecified whether persistent [J45 901]  1  Asthma with acute exacerbation, unspecified asthma severity, unspecified whether persistent  albuterol (VENTOLIN HFA) 90 mcg/act inhaler    predniSONE 20 mg tablet   2  Acute bronchitis, unspecified organism  brompheniramine-pseudoephedrine-DM 30-2-10 MG/5ML syrup   3  Acute sinusitis, recurrence not specified, unspecified location  amoxicillin (AMOXIL) 500 mg capsule         Patient Instructions     Take Bromfed DM, prednisone and albuterol as prescribed  Hold amoxicillin until symptomatic for 10-14 days   Fluids and rest  Nasal saline spray  Tylenol/Ibuprofen for pain/fever  Warm compresses over sinuses  Steam treatment (utilize proper safety precautions when in contact with hot water/steam)  Follow up with PCP in 3-5 days  Proceed to  ER if symptoms worsen  Chief Complaint     Chief Complaint   Patient presents with    Cough     sinus pressure; chest congestion; ear fullness; mucinex         History of Present Illness       Cough   This is a new problem  The current episode started in the past 7 days  The problem has been unchanged  The problem occurs every few minutes  Associated symptoms include chills, ear pain, headaches, shortness of breath and wheezing  Pertinent negatives include no chest pain, fever, myalgias, postnasal drip, rash, rhinorrhea or sore throat  Treatments tried: mucinex; Tylenol cold  His past medical history is significant for asthma  There is no history of bronchitis or pneumonia  Review of Systems   Review of Systems   Constitutional: Positive for chills  Negative for activity change, appetite change and fever  HENT: Positive for congestion, ear pain and sinus pressure   Negative for dental problem, ear discharge, facial swelling, postnasal drip, rhinorrhea, sinus pain, sneezing, sore throat and trouble swallowing  Eyes: Negative for itching  Respiratory: Positive for cough (dry), chest tightness, shortness of breath and wheezing  Cardiovascular: Negative for chest pain and palpitations  Gastrointestinal: Negative for abdominal pain, constipation, diarrhea, nausea and vomiting  Musculoskeletal: Negative for myalgias  Skin: Negative for rash  Neurological: Positive for headaches  Negative for dizziness, weakness and light-headedness           Current Medications       Current Outpatient Medications:     albuterol (PROVENTIL HFA,VENTOLIN HFA) 90 mcg/act inhaler, Inhale 2 puffs every 6 (six) hours as needed for wheezing, Disp: , Rfl:     albuterol (VENTOLIN HFA) 90 mcg/act inhaler, Inhale 2 puffs every 4 (four) hours as needed for wheezing or shortness of breath, Disp: 1 Inhaler, Rfl: 0    amoxicillin (AMOXIL) 500 mg capsule, Take 1 capsule (500 mg total) by mouth every 12 (twelve) hours for 7 days, Disp: 14 capsule, Rfl: 0    brompheniramine-pseudoephedrine-DM 30-2-10 MG/5ML syrup, Take 5 mL by mouth 4 (four) times a day as needed for congestion or cough, Disp: 120 mL, Rfl: 0    famotidine (PEPCID) 20 mg tablet, Take 1 tablet by mouth 2 (two) times a day (Patient not taking: Reported on 2/24/2019), Disp: 30 tablet, Rfl: 0    multivitamin (THERAGRAN) TABS, Take 1 tablet by mouth daily, Disp: , Rfl:     predniSONE 20 mg tablet, Take 1 tablet (20 mg total) by mouth daily for 5 days, Disp: 5 tablet, Rfl: 0    Current Allergies     Allergies as of 02/24/2019    (No Known Allergies)            The following portions of the patient's history were reviewed and updated as appropriate: allergies, current medications, past family history, past medical history, past social history, past surgical history and problem list      Past Medical History:   Diagnosis Date    Asthma        No past surgical history on file  No family history on file  Medications have been verified  Objective   /97   Pulse 88   Temp 98 6 °F (37 °C)   Resp 18   Wt 118 kg (260 lb)   SpO2 97%   BMI 35 26 kg/m²        Physical Exam     Physical Exam   Constitutional: He is oriented to person, place, and time  He appears well-developed and well-nourished  No distress  HENT:   Head: Normocephalic  Right Ear: External ear normal    Left Ear: External ear normal    Nose: Nose normal    Mouth/Throat: Oropharynx is clear and moist  No oropharyngeal exudate  No sinus TTP  Eyes: Conjunctivae are normal    Cardiovascular: Normal rate, regular rhythm, normal heart sounds and intact distal pulses  Exam reveals no gallop and no friction rub  No murmur heard  Pulmonary/Chest: Effort normal and breath sounds normal  No respiratory distress  He has no wheezes  He has no rales  He exhibits no tenderness  Lymphadenopathy:     He has no cervical adenopathy  Neurological: He is alert and oriented to person, place, and time  Skin: Skin is warm  He is not diaphoretic  Psychiatric: He has a normal mood and affect  His behavior is normal  Judgment and thought content normal    Vitals reviewed

## 2019-03-22 ENCOUNTER — OFFICE VISIT (OUTPATIENT)
Dept: URGENT CARE | Age: 39
End: 2019-03-22
Payer: COMMERCIAL

## 2019-03-22 VITALS
OXYGEN SATURATION: 95 % | HEIGHT: 74 IN | SYSTOLIC BLOOD PRESSURE: 142 MMHG | TEMPERATURE: 98 F | BODY MASS INDEX: 32.08 KG/M2 | WEIGHT: 250 LBS | RESPIRATION RATE: 16 BRPM | HEART RATE: 78 BPM | DIASTOLIC BLOOD PRESSURE: 91 MMHG

## 2019-03-22 DIAGNOSIS — J01.90 ACUTE BACTERIAL RHINOSINUSITIS: Primary | ICD-10-CM

## 2019-03-22 DIAGNOSIS — B96.89 ACUTE BACTERIAL RHINOSINUSITIS: Primary | ICD-10-CM

## 2019-03-22 PROCEDURE — 99213 OFFICE O/P EST LOW 20 MIN: CPT | Performed by: PHYSICIAN ASSISTANT

## 2019-03-22 RX ORDER — DOXYCYCLINE 100 MG/1
100 TABLET ORAL 2 TIMES DAILY
Qty: 14 TABLET | Refills: 0 | Status: SHIPPED | OUTPATIENT
Start: 2019-03-22 | End: 2019-03-29

## 2019-03-22 RX ORDER — PREDNISONE 50 MG/1
50 TABLET ORAL DAILY
Qty: 5 TABLET | Refills: 0 | Status: SHIPPED | OUTPATIENT
Start: 2019-03-22 | End: 2019-04-08

## 2019-03-22 NOTE — PROGRESS NOTES
[facility]        NAME: Jackie Mack is a 43 y o  male  : 1980    MRN: [id number]  DATE: 2019  TIME: 2:06 PM    Assessment and Plan   Acute bacterial rhinosinusitis [J01 90, B96 89]  1  Acute bacterial rhinosinusitis  doxycycline (ADOXA) 100 MG tablet    predniSONE 50 mg tablet       Patient Instructions   Take steroid as directed with food and water  While on this medication do not take any NSAIDs including ibuprofen (Advil), naproxen (Aleve), etc   Avoid caffeinated beverages while taking this medication  Take the antibiotic as directed with food and water  Take a probiotic while taking this medication  Continue to use your inhalers as previously directed and take Mucinex, as directed  Saltwater gargles, warm tea with honey, and throat lozenges may be relieving of throat discomfort  Use a cool mist humidifier at bedtime, turning on hours prior to bed with your bedroom doors shut for maximum relief  Follow up with your family doctor in 3-5 days  Proceed to the ER if symptoms worsen  The diagnosis, etiology, expected course of illness, and treatment plan were reviewed  All questions answered  Precautions given  Patient verbalized understanding and agreement the treatment plan  Chief Complaint     Chief Complaint   Patient presents with    Cold Like Symptoms     Pt c/o cold sx for 10 days  Pt states, "It doesn't seem to be going away "     History of Present Illness     42 y/o male presenting with c/o cold sx x 10 days  He notes NC, intermittent runny nose, sore throat, chest tightness, occasionally productive cough, and SOB  He has attempted treating with Mucinex Max strength, albuterol inhaler, and motrin  Mucinex does help to increase cough production, however, is otherwise not relieving of symptoms  He notes cough significantly worsened today requiring him to use his inhaler 4 times so far  No F/C/S, wheezing, CP, or GI symptoms   He reports to being sick 1 month ago and treated in this office for sinus infection and asthma exacerbation  He notes these symptoms lingered following treatment but did ultimately resolve  He notes the symptom free period of approximately 1 week prior to onset of current symptoms  He notes a history of illness induced asthma but denies any other breathing difficulties  He is not currently following with family doctor  Review of Systems   Review of Systems   Constitutional: Negative for chills, diaphoresis, fatigue and fever  HENT: Negative for ear pain and postnasal drip  Respiratory: Positive for cough, chest tightness and shortness of breath  Negative for wheezing  Cardiovascular: Negative for chest pain and palpitations  Gastrointestinal: Negative for abdominal pain, diarrhea, nausea and vomiting  Musculoskeletal: Negative for arthralgias and myalgias  Skin: Negative for rash  Neurological: Negative for dizziness and headaches       Current Medications       Current Outpatient Medications:     albuterol (PROVENTIL HFA,VENTOLIN HFA) 90 mcg/act inhaler, Inhale 2 puffs every 6 (six) hours as needed for wheezing, Disp: , Rfl:     albuterol (VENTOLIN HFA) 90 mcg/act inhaler, Inhale 2 puffs every 4 (four) hours as needed for wheezing or shortness of breath, Disp: 1 Inhaler, Rfl: 0    multivitamin (THERAGRAN) TABS, Take 1 tablet by mouth daily, Disp: , Rfl:     brompheniramine-pseudoephedrine-DM 30-2-10 MG/5ML syrup, Take 5 mL by mouth 4 (four) times a day as needed for congestion or cough (Patient not taking: Reported on 3/22/2019), Disp: 120 mL, Rfl: 0    doxycycline (ADOXA) 100 MG tablet, Take 1 tablet (100 mg total) by mouth 2 (two) times a day for 7 days, Disp: 14 tablet, Rfl: 0    famotidine (PEPCID) 20 mg tablet, Take 1 tablet by mouth 2 (two) times a day (Patient not taking: Reported on 2/24/2019), Disp: 30 tablet, Rfl: 0    predniSONE 50 mg tablet, Take 1 tablet (50 mg total) by mouth daily, Disp: 5 tablet, Rfl: 0    Current Allergies     Allergies as of 03/22/2019    (No Known Allergies)          The following portions of the patient's history were reviewed and updated as appropriate: allergies, current medications, past family history, past medical history, past social history, past surgical history and problem list      Past Medical History:   Diagnosis Date    Asthma      History reviewed  No pertinent surgical history  History reviewed  No pertinent family history  Medications have been verified  Objective   /91   Pulse 78   Temp 98 °F (36 7 °C) (Tympanic)   Resp 16   Ht 6' 2" (1 88 m)   Wt 113 kg (250 lb)   SpO2 95%   BMI 32 10 kg/m²      Physical Exam     Physical Exam   Constitutional: He is oriented to person, place, and time  Vital signs are normal  He appears well-developed and well-nourished  He is cooperative  He appears ill  No distress  HENT:   Head: Normocephalic and atraumatic  Right Ear: Hearing, tympanic membrane, external ear and ear canal normal  No middle ear effusion  Left Ear: Hearing, tympanic membrane, external ear and ear canal normal   No middle ear effusion  Nose: Mucosal edema and rhinorrhea present  Mouth/Throat: Uvula is midline, oropharynx is clear and moist and mucous membranes are normal  Mucous membranes are not pale, not dry and not cyanotic  No oral lesions  No uvula swelling  No oropharyngeal exudate, posterior oropharyngeal edema, posterior oropharyngeal erythema or tonsillar abscesses  Tonsils are 1+ on the right  Tonsils are 1+ on the left  No tonsillar exudate  Eyes: Conjunctivae and lids are normal  Right eye exhibits no discharge and no exudate  Left eye exhibits no discharge and no exudate  Neck: Trachea normal and phonation normal  Neck supple  No tracheal tenderness present  No neck rigidity  No edema and no erythema present  Cardiovascular: Normal rate, regular rhythm and normal heart sounds   Exam reveals no gallop, no distant heart sounds and no friction rub  No murmur heard  Pulmonary/Chest: Effort normal  No stridor  No respiratory distress  He has decreased breath sounds (diffuse)  He has no wheezes  He has no rhonchi  He has no rales  Coarse throughout  Abdominal: Soft  Bowel sounds are normal  He exhibits no distension and no mass  There is no tenderness  There is no rigidity, no rebound and no guarding  Lymphadenopathy:     He has no cervical adenopathy  Neurological: He is alert and oriented to person, place, and time  He is not disoriented  No cranial nerve deficit  He exhibits normal muscle tone  Coordination normal    Skin: Skin is warm, dry and intact  No rash noted  He is not diaphoretic  No erythema  No pallor  Psychiatric: He has a normal mood and affect  His behavior is normal  Judgment and thought content normal    Nursing note and vitals reviewed

## 2019-03-22 NOTE — PATIENT INSTRUCTIONS
Take steroid as directed with food and water  While on this medication do not take any NSAIDs including ibuprofen (Advil), naproxen (Aleve), etc   Avoid caffeinated beverages while taking this medication  Take the antibiotic as directed with food and water  Take a probiotic while taking this medication  Continue to use your inhalers as previously directed and take Mucinex, as directed  Saltwater gargles, warm tea with honey, and throat lozenges may be relieving of throat discomfort  Use a cool mist humidifier at bedtime, turning on hours prior to bed with your bedroom doors shut for maximum relief  Follow up with your family doctor in 3-5 days  Proceed to the ER if symptoms worsen

## 2019-04-07 PROBLEM — E66.811 CLASS 1 OBESITY DUE TO EXCESS CALORIES WITHOUT SERIOUS COMORBIDITY WITH BODY MASS INDEX (BMI) OF 33.0 TO 33.9 IN ADULT: Status: ACTIVE | Noted: 2019-04-07

## 2019-04-07 PROBLEM — E66.09 CLASS 1 OBESITY DUE TO EXCESS CALORIES WITHOUT SERIOUS COMORBIDITY WITH BODY MASS INDEX (BMI) OF 33.0 TO 33.9 IN ADULT: Status: ACTIVE | Noted: 2019-04-07

## 2019-04-07 PROBLEM — R79.89 ELEVATED LFTS: Status: ACTIVE | Noted: 2019-04-07

## 2019-04-07 PROBLEM — J30.1 SEASONAL ALLERGIC RHINITIS DUE TO POLLEN: Status: ACTIVE | Noted: 2019-04-07

## 2019-04-08 ENCOUNTER — OFFICE VISIT (OUTPATIENT)
Dept: FAMILY MEDICINE CLINIC | Facility: CLINIC | Age: 39
End: 2019-04-08
Payer: COMMERCIAL

## 2019-04-08 VITALS
RESPIRATION RATE: 16 BRPM | WEIGHT: 252.2 LBS | BODY MASS INDEX: 31.36 KG/M2 | SYSTOLIC BLOOD PRESSURE: 134 MMHG | HEART RATE: 68 BPM | HEIGHT: 75 IN | TEMPERATURE: 97.8 F | DIASTOLIC BLOOD PRESSURE: 84 MMHG

## 2019-04-08 DIAGNOSIS — R79.89 ELEVATED LFTS: ICD-10-CM

## 2019-04-08 DIAGNOSIS — F41.9 ANXIETY: ICD-10-CM

## 2019-04-08 DIAGNOSIS — Z13.6 SCREENING FOR CARDIOVASCULAR CONDITION: ICD-10-CM

## 2019-04-08 DIAGNOSIS — E66.09 CLASS 1 OBESITY DUE TO EXCESS CALORIES WITHOUT SERIOUS COMORBIDITY WITH BODY MASS INDEX (BMI) OF 33.0 TO 33.9 IN ADULT: Primary | ICD-10-CM

## 2019-04-08 DIAGNOSIS — J30.1 SEASONAL ALLERGIC RHINITIS DUE TO POLLEN: ICD-10-CM

## 2019-04-08 PROCEDURE — 99204 OFFICE O/P NEW MOD 45 MIN: CPT | Performed by: FAMILY MEDICINE

## 2019-04-08 PROCEDURE — 1036F TOBACCO NON-USER: CPT | Performed by: FAMILY MEDICINE

## 2019-04-08 PROCEDURE — 3008F BODY MASS INDEX DOCD: CPT | Performed by: FAMILY MEDICINE

## 2019-04-08 RX ORDER — CETIRIZINE HYDROCHLORIDE 10 MG/1
10 TABLET ORAL DAILY
Qty: 30 TABLET | Refills: 5
Start: 2019-04-08

## 2019-04-08 RX ORDER — ALPRAZOLAM 0.25 MG/1
0.25 TABLET ORAL 2 TIMES DAILY PRN
Qty: 60 TABLET | Refills: 3 | Status: SHIPPED | OUTPATIENT
Start: 2019-04-08 | End: 2019-07-05 | Stop reason: SDUPTHER

## 2019-07-05 ENCOUNTER — OFFICE VISIT (OUTPATIENT)
Dept: FAMILY MEDICINE CLINIC | Facility: CLINIC | Age: 39
End: 2019-07-05
Payer: COMMERCIAL

## 2019-07-05 VITALS
WEIGHT: 252 LBS | SYSTOLIC BLOOD PRESSURE: 132 MMHG | HEART RATE: 96 BPM | DIASTOLIC BLOOD PRESSURE: 84 MMHG | HEIGHT: 75 IN | BODY MASS INDEX: 31.33 KG/M2 | TEMPERATURE: 97.6 F | RESPIRATION RATE: 16 BRPM | OXYGEN SATURATION: 98 %

## 2019-07-05 DIAGNOSIS — J30.1 SEASONAL ALLERGIC RHINITIS DUE TO POLLEN: ICD-10-CM

## 2019-07-05 DIAGNOSIS — J01.00 ACUTE NON-RECURRENT MAXILLARY SINUSITIS: Primary | ICD-10-CM

## 2019-07-05 DIAGNOSIS — F41.9 ANXIETY: ICD-10-CM

## 2019-07-05 PROCEDURE — 1036F TOBACCO NON-USER: CPT | Performed by: FAMILY MEDICINE

## 2019-07-05 PROCEDURE — 3008F BODY MASS INDEX DOCD: CPT | Performed by: FAMILY MEDICINE

## 2019-07-05 PROCEDURE — 99214 OFFICE O/P EST MOD 30 MIN: CPT | Performed by: FAMILY MEDICINE

## 2019-07-05 RX ORDER — FLUTICASONE PROPIONATE 50 MCG
2 SPRAY, SUSPENSION (ML) NASAL DAILY
Qty: 1 BOTTLE | Refills: 1 | Status: SHIPPED | OUTPATIENT
Start: 2019-07-05

## 2019-07-05 RX ORDER — LEVOFLOXACIN 500 MG/1
TABLET, FILM COATED ORAL
Qty: 10 TABLET | Refills: 0 | Status: SHIPPED | OUTPATIENT
Start: 2019-07-05 | End: 2019-07-15

## 2019-07-05 RX ORDER — ALPRAZOLAM 0.25 MG/1
0.25 TABLET ORAL 2 TIMES DAILY PRN
Qty: 60 TABLET | Refills: 3 | Status: SHIPPED | OUTPATIENT
Start: 2019-07-05 | End: 2020-04-13 | Stop reason: SDUPTHER

## 2019-07-05 RX ORDER — METHYLPREDNISOLONE 4 MG/1
TABLET ORAL
Qty: 21 EACH | Refills: 0 | Status: SHIPPED | OUTPATIENT
Start: 2019-07-05 | End: 2020-04-13

## 2019-07-05 NOTE — ASSESSMENT & PLAN NOTE
Mood has been stable  Rx refilled for Alprazolam 0 25 mg to take 1 tablet twice daily PRN  Patient reports no side effects

## 2019-07-05 NOTE — ASSESSMENT & PLAN NOTE
Start using Flonase nasal spray daily  Continue Zyrtec D 24 hr  daily for 1 week, then can switch back to Zyrtec 10 mg daily

## 2019-07-05 NOTE — PROGRESS NOTES
Chief Complaint   Patient presents with    Sinusitis     Health Maintenance   Topic Date Due    DTaP,Tdap,and Td Vaccines (1 - Tdap) 04/23/2000    INFLUENZA VACCINE  09/05/2019 (Originally 7/1/2019)    Depression Screening PHQ  04/08/2020    BMI: Followup Plan  04/08/2020    BMI: Adult  04/08/2020    Pneumococcal Vaccine: 65+ Years (1 of 2 - PCV13) 04/23/2044    Pneumococcal Vaccine: Pediatrics (0 to 5 Years) and At-Risk Patients (6 to 59 Years)  Aged Out    HEPATITIS B VACCINES  Aged Out     Assessment/Plan:    Acute maxillary sinusitis  Start levofloxacin 500 mg 1 tablet daily with food for 10 days, Medrol Dosepak  Recommended to stay well hydrated  Advised to call office if symptoms persist or worsen  Seasonal allergic rhinitis due to pollen  Start using Flonase nasal spray daily  Continue Zyrtec D 24 hr  daily for 1 week, then can switch back to Zyrtec 10 mg daily  Anxiety  Mood has been stable  Rx refilled for Alprazolam 0 25 mg to take 1 tablet twice daily PRN  Patient reports no side effects  Diagnoses and all orders for this visit:    Acute non-recurrent maxillary sinusitis  -     levofloxacin (LEVAQUIN) 500 mg tablet; Take 1 tab  daily with food  -     fluticasone (FLONASE) 50 mcg/act nasal spray; 2 sprays into each nostril daily  -     methylPREDNISolone 4 MG tablet therapy pack; Take as directed with food    Seasonal allergic rhinitis due to pollen  -     fluticasone (FLONASE) 50 mcg/act nasal spray; 2 sprays into each nostril daily    Anxiety  -     ALPRAZolam (XANAX) 0 25 mg tablet; Take 1 tablet (0 25 mg total) by mouth 2 (two) times a day as needed for anxiety          Subjective:      Patient ID: Collin Kamara is a 35 y o  male  HPI     Patient presents to the office c/o sinus congestin, sinus pressure, PND, mild cough, headache for the last 10 days  Patient denies fever, chills  He tried  Mary Carmen-Manderson, Zyrtec D with minimal relief in symptoms        Patient has seasonal allergies, allergic rhinitis  He was previously taking Zyrtec 10 mg daily but switched to Zyrtec D when developed sinus congestion and sinus pressure  Denies tobacco use  Patient has history of anxiety  He requesting refill for Xanax 0 25 mg  which he takes twice daily on as needed basis  Denies anxiety / panic attacks  Denies feeling depressed  The following portions of the patient's history were reviewed and updated as appropriate: allergies, current medications, past family history, past social history, past surgical history and problem list     Review of Systems   Constitutional: Positive for fatigue  Negative for activity change, appetite change, chills and fever  HENT: Positive for congestion, postnasal drip and sinus pressure  Negative for ear pain, facial swelling, hearing loss, mouth sores, nosebleeds, sore throat and trouble swallowing  Eyes: Negative for pain, discharge, redness, itching and visual disturbance  Respiratory: Positive for cough  Negative for chest tightness, shortness of breath and wheezing  Cardiovascular: Negative for chest pain, palpitations and leg swelling  Gastrointestinal: Negative for abdominal pain, blood in stool, constipation, diarrhea, nausea and vomiting  Genitourinary: Negative for difficulty urinating, enuresis, flank pain, frequency and hematuria  Musculoskeletal: Negative for arthralgias, back pain, joint swelling and neck pain  Skin: Negative for rash and wound  Neurological: Positive for headaches  Negative for dizziness  Hematological: Negative  Psychiatric/Behavioral: Positive for sleep disturbance  Negative for agitation          Anxiety         Objective:      /84 (BP Location: Left arm, Patient Position: Sitting, Cuff Size: Adult)   Pulse 96   Temp 97 6 °F (36 4 °C) (Tympanic)   Resp 16   Ht 6' 3" (1 905 m)   Wt 114 kg (252 lb)   SpO2 98%   BMI 31 50 kg/m²          Physical Exam Constitutional: He appears well-developed and well-nourished  Obese   HENT:   Head: Normocephalic and atraumatic  Right Ear: External ear normal    Left Ear: External ear normal    Mouth/Throat: Oropharynx is clear and moist    Nasal mucosa is swollen, erythematous   Eyes: Pupils are equal, round, and reactive to light  Conjunctivae are normal    Cardiovascular: Normal rate, regular rhythm and normal heart sounds  No murmur heard  Pulmonary/Chest: Effort normal and breath sounds normal    Abdominal: Soft  Bowel sounds are normal  There is no tenderness  Musculoskeletal: Normal range of motion  He exhibits no edema  Skin: Skin is warm and dry  No rash noted  Psychiatric: He has a normal mood and affect  Nursing note and vitals reviewed

## 2019-07-05 NOTE — ASSESSMENT & PLAN NOTE
Start levofloxacin 500 mg 1 tablet daily with food for 10 days, Medrol Dosepak  Recommended to stay well hydrated  Advised to call office if symptoms persist or worsen

## 2019-11-11 ENCOUNTER — OFFICE VISIT (OUTPATIENT)
Dept: URGENT CARE | Age: 39
End: 2019-11-11
Payer: COMMERCIAL

## 2019-11-11 VITALS
WEIGHT: 261.2 LBS | TEMPERATURE: 97.7 F | HEART RATE: 97 BPM | RESPIRATION RATE: 20 BRPM | DIASTOLIC BLOOD PRESSURE: 94 MMHG | SYSTOLIC BLOOD PRESSURE: 164 MMHG | HEIGHT: 75 IN | OXYGEN SATURATION: 98 % | BODY MASS INDEX: 32.48 KG/M2

## 2019-11-11 DIAGNOSIS — J02.9 SORE THROAT: ICD-10-CM

## 2019-11-11 DIAGNOSIS — J01.00 ACUTE MAXILLARY SINUSITIS, RECURRENCE NOT SPECIFIED: Primary | ICD-10-CM

## 2019-11-11 LAB — S PYO AG THROAT QL: NEGATIVE

## 2019-11-11 PROCEDURE — 99213 OFFICE O/P EST LOW 20 MIN: CPT | Performed by: PHYSICIAN ASSISTANT

## 2019-11-11 PROCEDURE — 87147 CULTURE TYPE IMMUNOLOGIC: CPT | Performed by: PHYSICIAN ASSISTANT

## 2019-11-11 PROCEDURE — 87070 CULTURE OTHR SPECIMN AEROBIC: CPT | Performed by: PHYSICIAN ASSISTANT

## 2019-11-11 RX ORDER — AMOXICILLIN AND CLAVULANATE POTASSIUM 875; 125 MG/1; MG/1
1 TABLET, FILM COATED ORAL EVERY 12 HOURS SCHEDULED
Qty: 14 TABLET | Refills: 0 | Status: SHIPPED | OUTPATIENT
Start: 2019-11-11 | End: 2019-11-18

## 2019-11-11 RX ORDER — METHYLPREDNISOLONE 4 MG/1
TABLET ORAL
Qty: 21 TABLET | Refills: 0 | Status: SHIPPED | OUTPATIENT
Start: 2019-11-11 | End: 2020-04-13

## 2019-11-11 NOTE — PATIENT INSTRUCTIONS
Follow up with PCP in 3-5 days  Proceed to  ER if symptoms worsen  Patient will begin Augmentin and Medrol dose pack as directed   Continue with symptomatic treatment  Patient will continue flonase as needed for nasal congestion  Claritin D as directed   Tylenol as needed for fever of chills   Warm salt gargles as needed for sore throat     Sinusitis, Ambulatory Care   GENERAL INFORMATION:   Sinusitis  is inflammation or infection of your sinuses  It is most often caused by a virus  Acute sinusitis may last up to 12 weeks  Chronic sinusitis lasts longer than 12 weeks  Recurrent sinusitis is when you have 3 or more episodes of sinusitis in 1 year  Common symptoms include the following:   · Fever    · Pain, pressure, redness, or swelling around the forehead, cheeks, or eyes    · Thick yellow or green discharge from your nose    · Tenderness when you touch your face over your sinuses    · Dry cough that happens mostly at night or when you lie down    · Headache and face pain that is worse when you lean forward    · Teeth pain or pain when you chew  Seek immediate care for the following symptoms:   · Vision changes such as double vision    · Confusion or trouble thinking clearly    · Headache and stiff neck    · Trouble breathing  Treatment for sinusitis  may include medicines to relieve nasal and sinus congestion or to decrease pain and fever  Ask your healthcare provider which medicines you should take and how much is safe  Manage sinusitis:   · Drink liquids as directed  Ask your healthcare provider how much liquid to drink each day and which liquids are best for you  Liquids will help loosen and drain the mucus in your sinuses  · Breathe in steam   Heat a bowl of water until you see steam  Lean over the bowl and make a tent over your head with a large towel  Breathe deeply for about 20 minutes  Be careful not to get too close to the steam or burn yourself  Do this 3 times a day   You can also breathe deeply when you take a hot shower  · Rinse your sinuses  Use a sinus rinse device to rinse your nasal passages with a saline (salt water) solution  This will help thin the mucus in your nose and rinse away pollen and dirt  It will also help reduce swelling so you can breathe normally  Ask how often to do this  · Use heat on your sinuses  to decrease pain  Apply heat for 15 to 20 minutes every hour for as many days as directed  · Sleep with your head elevated  Place an extra pillow under your head before you go to sleep to help your sinuses drain  · Do not smoke and avoid secondhand smoke  If you smoke, it is never too late to quit  Ask for information about how to stop smoking if you need help  Prevent the spread of germs that cause sinusitis:  Wash your hands often with soap and water  Wash your hands after you use the bathroom, change a child's diaper, or sneeze  Wash your hands before you prepare or eat food  Follow up with your healthcare provider as directed:  Write down your questions so you remember to ask them during your visits  CARE AGREEMENT:   You have the right to help plan your care  Learn about your health condition and how it may be treated  Discuss treatment options with your caregivers to decide what care you want to receive  You always have the right to refuse treatment  The above information is an  only  It is not intended as medical advice for individual conditions or treatments  Talk to your doctor, nurse or pharmacist before following any medical regimen to see if it is safe and effective for you  © 2014 [address] is for End User's use only and may not be sold, redistributed or otherwise used for commercial purposes  All illustrations and images included in CareNotes® are the copyrighted property of [employer]  or [employer]

## 2019-11-11 NOTE — PROGRESS NOTES
[facility]        NAME: Tamara Wellington is a 35 y o  male  : 1980    MRN: [id number]  DATE: 2019  TIME: 5:31 PM    Assessment and Plan   Acute maxillary sinusitis, recurrence not specified [J01 00]  1  Acute maxillary sinusitis, recurrence not specified     2  Sore throat  POCT rapid strepA    Throat culture         Patient Instructions     Follow up with PCP in 3-5 days  Proceed to  ER if symptoms worsen  Patient will begin Augmentin and Medrol dose pack as directed   Continue with symptomatic treatment  Patient will continue flonase as needed for nasal congestion  Claritin D as directed   Tylenol as needed for fever of chills   Warm salt gargles as needed for sore throat     Chief Complaint     Chief Complaint   Patient presents with    Cold Like Symptoms     c/o sore throat, runny nose, HA, sinus pressure, cough  Symptoms began last Tuesday  History of Present Illness       Patient is a 69-year-old male presenting the office for sinus symptoms  Patient states that he has been having symptoms for the past 3-4 days duration  Patient states that his symptoms began with nasal congestion and has progressed to sore throat headache and facial pain  Patient has been taking Flonase and Zyrtec with minimal relief  Patient has a history of a GI bug approximately 1 week ago which has since resolved  Patient states that he had a sinus infection approximately 2-3 months ago and was treated with Levaquin  Patient states that his symptoms are less severe than his previous signs of action  Patient does admit to having a fevers and chills  Patient denies any nausea vomiting diarrhea, chest tightness shortness of breath  Patient states that he does have muscle pain associated with sinus infection  Patient offers no other complaints at this time  Review of Systems   Review of Systems   Constitutional: Positive for chills, diaphoresis, fatigue and fever   Negative for activity change, appetite change and unexpected weight change  HENT: Positive for congestion, postnasal drip, rhinorrhea, sinus pressure, sinus pain and sore throat  Negative for dental problem, drooling, ear discharge, ear pain, facial swelling, hearing loss, mouth sores, nosebleeds, sneezing, tinnitus, trouble swallowing and voice change  Eyes: Negative  Respiratory: Negative  Cardiovascular: Negative  Gastrointestinal: Negative  Endocrine: Negative  Genitourinary: Negative  Musculoskeletal: Positive for myalgias  Negative for arthralgias, back pain, gait problem, joint swelling, neck pain and neck stiffness  Skin: Negative  Allergic/Immunologic: Negative  Neurological: Negative  Hematological: Negative  Psychiatric/Behavioral: Negative            Current Medications       Current Outpatient Medications:     albuterol (PROVENTIL HFA,VENTOLIN HFA) 90 mcg/act inhaler, Inhale 2 puffs every 6 (six) hours as needed for wheezing, Disp: , Rfl:     ALPRAZolam (XANAX) 0 25 mg tablet, Take 1 tablet (0 25 mg total) by mouth 2 (two) times a day as needed for anxiety, Disp: 60 tablet, Rfl: 3    cetirizine (ZyrTEC) 10 mg tablet, Take 1 tablet (10 mg total) by mouth daily, Disp: 30 tablet, Rfl: 5    fluticasone (FLONASE) 50 mcg/act nasal spray, 2 sprays into each nostril daily, Disp: 1 Bottle, Rfl: 1    multivitamin (THERAGRAN) TABS, Take 1 tablet by mouth daily, Disp: , Rfl:     methylPREDNISolone 4 MG tablet therapy pack, Take as directed with food (Patient not taking: Reported on 11/11/2019), Disp: 21 each, Rfl: 0    Current Allergies     Allergies as of 11/11/2019    (No Known Allergies)            The following portions of the patient's history were reviewed and updated as appropriate: allergies, current medications, past family history, past medical history, past social history, past surgical history and problem list      Past Medical History:   Diagnosis Date    Allergic     Anxiety  Asthma     Obesity        Past Surgical History:   Procedure Laterality Date    MYRINGOTOMY W/ TUBES         Family History   Problem Relation Age of Onset    Diabetes Mother     Hypertension Mother     Alcohol abuse Father          Medications have been verified  Objective   /94 (BP Location: Left arm, Patient Position: Sitting, Cuff Size: Extra-Large)   Pulse 97   Temp 97 7 °F (36 5 °C) (Tympanic)   Resp 20   Ht 6' 3" (1 905 m)   Wt 118 kg (261 lb 3 2 oz)   SpO2 98%   BMI 32 65 kg/m²        Physical Exam     Physical Exam   Constitutional: He is oriented to person, place, and time  He appears well-developed and well-nourished  HENT:   Head: Normocephalic  Right Ear: Hearing, tympanic membrane, external ear and ear canal normal    Left Ear: Hearing, tympanic membrane, external ear and ear canal normal    Nose: Right sinus exhibits maxillary sinus tenderness  Right sinus exhibits no frontal sinus tenderness  Left sinus exhibits maxillary sinus tenderness  Left sinus exhibits no frontal sinus tenderness  Mouth/Throat: Uvula is midline, oropharynx is clear and moist and mucous membranes are normal  Tonsils are 0 on the right  Tonsils are 0 on the left  Eyes: Pupils are equal, round, and reactive to light  Conjunctivae and EOM are normal    Neck: Normal range of motion  Cardiovascular: Normal rate, regular rhythm, normal heart sounds and intact distal pulses  Pulmonary/Chest: Effort normal and breath sounds normal    Abdominal: Soft  Neurological: He is alert and oriented to person, place, and time  Skin: Skin is warm  Capillary refill takes less than 2 seconds  Psychiatric: He has a normal mood and affect  His behavior is normal  Judgment and thought content normal    Nursing note and vitals reviewed        Negative rapid strep

## 2019-11-14 LAB — BACTERIA THROAT CULT: ABNORMAL

## 2020-04-13 DIAGNOSIS — F41.9 ANXIETY: ICD-10-CM

## 2020-04-13 PROBLEM — J01.00 ACUTE MAXILLARY SINUSITIS: Status: RESOLVED | Noted: 2019-07-05 | Resolved: 2020-04-13

## 2020-04-13 RX ORDER — ALPRAZOLAM 0.25 MG/1
0.25 TABLET ORAL 2 TIMES DAILY PRN
Qty: 60 TABLET | Refills: 3 | Status: SHIPPED | OUTPATIENT
Start: 2020-04-13 | End: 2020-04-13 | Stop reason: SDUPTHER

## 2020-04-13 RX ORDER — ALPRAZOLAM 0.25 MG/1
0.25 TABLET ORAL 2 TIMES DAILY PRN
Qty: 60 TABLET | Refills: 3 | Status: SHIPPED | OUTPATIENT
Start: 2020-04-13 | End: 2020-04-16 | Stop reason: SDUPTHER

## 2020-04-16 RX ORDER — ALPRAZOLAM 0.25 MG/1
0.25 TABLET ORAL 2 TIMES DAILY PRN
Qty: 60 TABLET | Refills: 3 | Status: SHIPPED | OUTPATIENT
Start: 2020-04-16 | End: 2021-07-15 | Stop reason: SDUPTHER

## 2020-06-10 ENCOUNTER — OCCMED (OUTPATIENT)
Dept: URGENT CARE | Facility: MEDICAL CENTER | Age: 40
End: 2020-06-10
Payer: OTHER MISCELLANEOUS

## 2020-06-10 ENCOUNTER — APPOINTMENT (OUTPATIENT)
Dept: RADIOLOGY | Facility: MEDICAL CENTER | Age: 40
End: 2020-06-10
Payer: OTHER MISCELLANEOUS

## 2020-06-10 DIAGNOSIS — M54.50 ACUTE LEFT-SIDED LOW BACK PAIN WITHOUT SCIATICA: Primary | ICD-10-CM

## 2020-06-10 DIAGNOSIS — M54.50 ACUTE LEFT-SIDED LOW BACK PAIN WITHOUT SCIATICA: ICD-10-CM

## 2020-06-10 PROCEDURE — 72100 X-RAY EXAM L-S SPINE 2/3 VWS: CPT

## 2020-06-10 PROCEDURE — 99284 EMERGENCY DEPT VISIT MOD MDM: CPT | Performed by: PHYSICIAN ASSISTANT

## 2020-06-10 PROCEDURE — G0383 LEV 4 HOSP TYPE B ED VISIT: HCPCS | Performed by: PHYSICIAN ASSISTANT

## 2020-06-12 ENCOUNTER — APPOINTMENT (OUTPATIENT)
Dept: URGENT CARE | Facility: MEDICAL CENTER | Age: 40
End: 2020-06-12
Payer: OTHER MISCELLANEOUS

## 2020-06-12 PROCEDURE — 99213 OFFICE O/P EST LOW 20 MIN: CPT | Performed by: PHYSICIAN ASSISTANT

## 2020-06-17 ENCOUNTER — APPOINTMENT (OUTPATIENT)
Dept: URGENT CARE | Facility: MEDICAL CENTER | Age: 40
End: 2020-06-17
Payer: OTHER MISCELLANEOUS

## 2020-06-17 ENCOUNTER — TRANSCRIBE ORDERS (OUTPATIENT)
Dept: URGENT CARE | Facility: MEDICAL CENTER | Age: 40
End: 2020-06-17

## 2020-06-17 PROCEDURE — 99213 OFFICE O/P EST LOW 20 MIN: CPT | Performed by: PHYSICIAN ASSISTANT

## 2020-06-24 ENCOUNTER — EVALUATION (OUTPATIENT)
Dept: PHYSICAL THERAPY | Facility: REHABILITATION | Age: 40
End: 2020-06-24
Payer: OTHER MISCELLANEOUS

## 2020-06-24 DIAGNOSIS — M54.50 ACUTE LEFT-SIDED LOW BACK PAIN WITHOUT SCIATICA: Primary | ICD-10-CM

## 2020-06-24 PROCEDURE — 97162 PT EVAL MOD COMPLEX 30 MIN: CPT | Performed by: PHYSICAL THERAPIST

## 2020-06-29 ENCOUNTER — OFFICE VISIT (OUTPATIENT)
Dept: FAMILY MEDICINE CLINIC | Facility: CLINIC | Age: 40
End: 2020-06-29
Payer: COMMERCIAL

## 2020-06-29 ENCOUNTER — APPOINTMENT (OUTPATIENT)
Dept: URGENT CARE | Facility: MEDICAL CENTER | Age: 40
End: 2020-06-29
Payer: OTHER MISCELLANEOUS

## 2020-06-29 VITALS
SYSTOLIC BLOOD PRESSURE: 132 MMHG | TEMPERATURE: 98.5 F | DIASTOLIC BLOOD PRESSURE: 84 MMHG | HEIGHT: 75 IN | HEART RATE: 80 BPM | OXYGEN SATURATION: 98 % | WEIGHT: 259.4 LBS | BODY MASS INDEX: 32.25 KG/M2 | RESPIRATION RATE: 16 BRPM

## 2020-06-29 DIAGNOSIS — F41.8 ANXIETY ASSOCIATED WITH DEPRESSION: ICD-10-CM

## 2020-06-29 DIAGNOSIS — Z13.6 SCREENING FOR CARDIOVASCULAR CONDITION: ICD-10-CM

## 2020-06-29 DIAGNOSIS — E66.09 CLASS 1 OBESITY DUE TO EXCESS CALORIES WITHOUT SERIOUS COMORBIDITY WITH BODY MASS INDEX (BMI) OF 33.0 TO 33.9 IN ADULT: ICD-10-CM

## 2020-06-29 DIAGNOSIS — F41.9 ANXIETY: Primary | ICD-10-CM

## 2020-06-29 DIAGNOSIS — M54.42 ACUTE LEFT-SIDED LOW BACK PAIN WITH LEFT-SIDED SCIATICA: ICD-10-CM

## 2020-06-29 DIAGNOSIS — S39.012D STRAIN OF LUMBAR REGION, SUBSEQUENT ENCOUNTER: ICD-10-CM

## 2020-06-29 PROBLEM — M54.50 ACUTE LEFT-SIDED LOW BACK PAIN WITHOUT SCIATICA: Status: ACTIVE | Noted: 2020-06-29

## 2020-06-29 PROBLEM — S39.012A LOW BACK STRAIN: Status: ACTIVE | Noted: 2020-06-29

## 2020-06-29 PROCEDURE — 3008F BODY MASS INDEX DOCD: CPT | Performed by: FAMILY MEDICINE

## 2020-06-29 PROCEDURE — 1036F TOBACCO NON-USER: CPT | Performed by: FAMILY MEDICINE

## 2020-06-29 PROCEDURE — 99213 OFFICE O/P EST LOW 20 MIN: CPT | Performed by: FAMILY MEDICINE

## 2020-06-29 PROCEDURE — 99214 OFFICE O/P EST MOD 30 MIN: CPT | Performed by: FAMILY MEDICINE

## 2020-06-29 RX ORDER — CITALOPRAM 20 MG/1
TABLET ORAL
Qty: 30 TABLET | Refills: 5 | Status: SHIPPED | OUTPATIENT
Start: 2020-06-29 | End: 2021-08-14 | Stop reason: ALTCHOICE

## 2020-07-01 ENCOUNTER — OFFICE VISIT (OUTPATIENT)
Dept: PHYSICAL THERAPY | Facility: REHABILITATION | Age: 40
End: 2020-07-01
Payer: OTHER MISCELLANEOUS

## 2020-07-01 DIAGNOSIS — M54.50 ACUTE LEFT-SIDED LOW BACK PAIN WITHOUT SCIATICA: Primary | ICD-10-CM

## 2020-07-01 PROCEDURE — 97140 MANUAL THERAPY 1/> REGIONS: CPT

## 2020-07-01 PROCEDURE — 97112 NEUROMUSCULAR REEDUCATION: CPT

## 2020-07-01 NOTE — PROGRESS NOTES
Daily Note     Today's date: 2020  Patient name: Yuliana Chau  : 1980  MRN: [id number]  Referring provider: Tracey Chambers MD  Dx:   Encounter Diagnosis     ICD-10-CM    1  Acute left-sided low back pain without sciatica M54 5                   Subjective: Pt reports that he is having pain today 7/10 in his left low back  Notes that he has been semi- compliant with his HEP, and is trying the chiropractor as well, having a point later today  Objective: See treatment diary below      Assessment: Tolerated treatment fair  A decrease in pain was noted with repeated extension to a 3/10  Reviewed pts posture and lifting mechanics to begin session since he demonstrated a slouched position with sitting  Mobs were perform by PT, NT today followed by repeated ext with OP with pt denying any pain  Side glides R did cause increased pain and decreased with repeated ext  Patient demonstrated fatigue post treatment, exhibited good technique with therapeutic exercises and would benefit from continued PT      Plan: Continue per plan of care        Precautions: anxiety      Manuals            PA glides L4,L5  Gr IV NT                                                  Neuro Re-Ed                                                                                                        Ther Ex             Prone on elbows 1 min 3 min           Prone press ups 3x10 3x10           Press ups with OP 2x10 3x10           Standing Extension  10x           Posture review  5'           Standing R slide glides  Inc symp                                     Ther Activity                                       Gait Training                                       Modalities

## 2020-07-07 ENCOUNTER — OFFICE VISIT (OUTPATIENT)
Dept: PHYSICAL THERAPY | Facility: REHABILITATION | Age: 40
End: 2020-07-07
Payer: OTHER MISCELLANEOUS

## 2020-07-07 DIAGNOSIS — M54.50 ACUTE LEFT-SIDED LOW BACK PAIN WITHOUT SCIATICA: Primary | ICD-10-CM

## 2020-07-07 PROCEDURE — 97112 NEUROMUSCULAR REEDUCATION: CPT

## 2020-07-07 PROCEDURE — 97140 MANUAL THERAPY 1/> REGIONS: CPT | Performed by: PHYSICAL MEDICINE & REHABILITATION

## 2020-07-07 NOTE — PROGRESS NOTES
Daily Note     Today's date: 2020  Patient name: Marisel Ho  : 1980  MRN: [id number]  Referring provider: Mariza Pak MD  Dx:   Encounter Diagnosis     ICD-10-CM    1  Acute left-sided low back pain without sciatica M54 5                   Subjective: Pt reports that he is not doing very well today with pain in his left low back  Notes that he has been seeing the chiropractor which has been providing  relief  Objective: See treatment diary below      Assessment: Tolerated treatment fair  Pt demonstrated tenderness in his L SI region today  He was posteriorly rotated on the L hip therefore a L hip flexion isometric was performed  Pt was able to ambulate in a pain free motion and progressed to pelvic stability exercises demonstrating weak L hip Abductors  Patient demonstrated fatigue post treatment, exhibited good technique with therapeutic exercises and would benefit from continued PT      Plan: Continue per plan of care        Precautions: anxiety      Manuals           PA glishruthi L4,L5  Gr IV NT           MET for anterior L SI   NC          Long axis lumbar distraction L   NC          Anterior innominate oscillation   NC          Neuro Re-Ed                                                                                                        Ther Ex             Prone on elbows 1 min 3 min 3 min          Prone press ups 3x10 3x10 2x10          Press ups with OP 2x10 3x10           Standing Extension  10x           Posture review  5' 5'          Standing R slide glides  Inc symp           L hip flex iso   5"x10          LTR   5"x10 ea          Prone alt arm/ leg   10x ea          Superman prone   10x           Bridge with TB abd   RTB 2x10          Clamshells   RTB 2x10          Ther Activity                                       Gait Training                                       Modalities

## 2020-07-09 ENCOUNTER — OFFICE VISIT (OUTPATIENT)
Dept: PHYSICAL THERAPY | Facility: REHABILITATION | Age: 40
End: 2020-07-09
Payer: OTHER MISCELLANEOUS

## 2020-07-09 DIAGNOSIS — M54.50 ACUTE LEFT-SIDED LOW BACK PAIN WITHOUT SCIATICA: Primary | ICD-10-CM

## 2020-07-09 PROCEDURE — 97112 NEUROMUSCULAR REEDUCATION: CPT

## 2020-07-09 NOTE — PROGRESS NOTES
Daily Note     Today's date: 2020  Patient name: Nina Escobar  : 1980  MRN: [id number]  Referring provider: Gilbert Reyes MD  Dx:   Encounter Diagnosis     ICD-10-CM    1  Acute left-sided low back pain without sciatica M54 5                   Subjective: Pt reports that is is feeling sore in both hips today but does note that today is a better day as far as his back pain  He continues to see the chiropractor  Objective: See treatment diary below      Assessment: Tolerated treatment fair  Leg length was checked to start session with hips aligned  Still focused on L SI joint performing an MET  Continued exercises were performed with weakness present  Patient demonstrated fatigue post treatment, exhibited good technique with therapeutic exercises and would benefit from continued PT      Plan: Continue per plan of care        Precautions: anxiety      Manuals          PA glides L4,L5  Gr IV NT           MET for anterior L SI   NC MM         Long axis lumbar distraction L   NC          Anterior innominate oscillation   NC          Neuro Re-Ed                                                                                                        Ther Ex             Prone on elbows 1 min 3 min 3 min          Prone press ups 3x10 3x10 2x10          Press ups with OP 2x10 3x10           Standing Extension  10x           Posture review  5' 5'          Standing R slide glides  Inc symp           L hip flex iso   5"x10 5"x10         LTR   5"x10 ea 5"x10 ea         Prone alt arm/ leg   10x ea 10x ea LE         Superman prone   10x  10x         Bridge with TB abd   RTB 2x10 RTB 2x10         Clamshells   RTB 2x10 RTB 2x10         Hooklying pball press    2x10 5"         TA SLR    2x10         Ther Activity                                       Gait Training                                       Modalities

## 2020-07-14 ENCOUNTER — OFFICE VISIT (OUTPATIENT)
Dept: PHYSICAL THERAPY | Facility: REHABILITATION | Age: 40
End: 2020-07-14
Payer: OTHER MISCELLANEOUS

## 2020-07-14 DIAGNOSIS — M54.50 ACUTE LEFT-SIDED LOW BACK PAIN WITHOUT SCIATICA: Primary | ICD-10-CM

## 2020-07-14 PROCEDURE — 97112 NEUROMUSCULAR REEDUCATION: CPT

## 2020-07-14 PROCEDURE — 97140 MANUAL THERAPY 1/> REGIONS: CPT

## 2020-07-14 NOTE — PROGRESS NOTES
Daily Note     Today's date: 2020  Patient name: Nuno Howard  : 1980  MRN: [id number]  Referring provider: Maddy Roberson MD  Dx:   Encounter Diagnosis     ICD-10-CM    1  Acute left-sided low back pain without sciatica M54 5                   Subjective: Pt reports that he saw his pain specialist who gave him anti inflammatory's and told him to schedule an MRI  Presents to therapy with increased left low back soreness, noticing it got worse on his car ride over  Objective: See treatment diary below      Assessment: Tolerated treatment fair  Leg length was checked to start session with his LLE out of line  An MET was performed to correct it evelyn the anterior muscles on the L side  Increased L piriformis tenderness present  Patient demonstrated fatigue post treatment, exhibited good technique with therapeutic exercises and would benefit from continued PT  Updated pts HEP to end session  Plan: Continue per plan of care        Precautions: anxiety      Manuals         PA glides L4,L5  Gr IV NT           MET for anterior L SI   NC MM MM        Long axis lumbar distraction L   NC          Anterior innominate oscillation   NC          L Piriformis TrP     MM        Neuro Re-Ed                                                                                                        Ther Ex             Prone on elbows 1 min 3 min 3 min          Prone press ups 3x10 3x10 2x10          Press ups with OP 2x10 3x10           Standing Extension  10x           Posture review  5' 5'          Standing R slide glides  Inc symp           L hip flex iso   5"x10 5"x10 5" 2x10        LTR   5"x10 ea 5"x10 ea 5"x10 ea        Prone alt arm/ leg   10x ea 10x ea LE         Superman prone   10x  10x 10x        Bridge with TB abd   RTB 2x10 RTB 2x10 RTB 2x10        Clamshells   RTB 2x10 RTB 2x10 RTB 2x10        Hooklying pball press    2x10 5" 2x10 5"        TA SLR    2x10 2x10 Piriformis stretch     20"x4 ea        Ther Activity                                       Gait Training                                       Modalities

## 2020-07-16 ENCOUNTER — OFFICE VISIT (OUTPATIENT)
Dept: PHYSICAL THERAPY | Facility: REHABILITATION | Age: 40
End: 2020-07-16
Payer: OTHER MISCELLANEOUS

## 2020-07-16 DIAGNOSIS — M54.50 ACUTE LEFT-SIDED LOW BACK PAIN WITHOUT SCIATICA: Primary | ICD-10-CM

## 2020-07-16 PROCEDURE — 97112 NEUROMUSCULAR REEDUCATION: CPT

## 2020-07-16 PROCEDURE — 97140 MANUAL THERAPY 1/> REGIONS: CPT

## 2020-07-16 NOTE — PROGRESS NOTES
Daily Note     Today's date: 2020  Patient name: Yuliana Chau  : 1980  MRN: [id number]  Referring provider: Tracey Chambers MD  Dx:   Encounter Diagnosis     ICD-10-CM    1  Acute left-sided low back pain without sciatica M54 5                   Subjective: Pt reports that he is having 6/10 pain in his L low back  Notes that he leaves therapy and it feels good but after a few hours, the pain comes back  Objective: See treatment diary below      Assessment: Tolerated treatment fair  Pt continues to be posteriorly rotated on the L hip therefore was corrected with a shotgun technique and L hip iso  Today's session focused mostly on TA activation along with exercises mixed in  A poor contraction was present with excessive education provided  Patient demonstrated fatigue post treatment, exhibited good technique with therapeutic exercises and would benefit from continued PT  Updated pts HEP to end session  Plan: Continue per plan of care        Precautions: anxiety      Manuals        PA glides L4,L5  Gr IV NT           MET for anterior L SI   NC MM MM MM/ HY shotgun       Long axis lumbar distraction L   NC          Anterior innominate oscillation   NC          L Piriformis TrP     MM        Neuro Re-Ed             TA Iso      10"x 15       TA BKFO      nv?       TA March      12x ea                                                           Ther Ex             Prone on elbows 1 min 3 min 3 min          Prone press ups 3x10 3x10 2x10          Press ups with OP 2x10 3x10           Standing Extension  10x           Posture review  5' 5'          Standing R slide glides  Inc symp           L hip flex iso   5"x10 5"x10 5" 2x10        LTR   5"x10 ea 5"x10 ea 5"x10 ea 5" x10 ea       Prone alt arm/ leg   10x ea 10x ea LE         Superman prone   10x  10x 10x        Bridge with TB abd   RTB 2x10 RTB 2x10 RTB 2x10 RTB 2x10       Clamshells   RTB 2x10 RTB 2x10 RTB 2x10 Twin pball press    2x10 5" 2x10 5" 2x10 5"       TA SLR    2x10 2x10        Piriformis stretch     20"x4 ea 20"x4 ea       Ther Activity                                       Gait Training                                       Modalities

## 2020-07-21 ENCOUNTER — OFFICE VISIT (OUTPATIENT)
Dept: PHYSICAL THERAPY | Facility: REHABILITATION | Age: 40
End: 2020-07-21
Payer: OTHER MISCELLANEOUS

## 2020-07-21 DIAGNOSIS — M54.50 ACUTE LEFT-SIDED LOW BACK PAIN WITHOUT SCIATICA: Primary | ICD-10-CM

## 2020-07-21 PROCEDURE — 97140 MANUAL THERAPY 1/> REGIONS: CPT | Performed by: PHYSICAL MEDICINE & REHABILITATION

## 2020-07-21 PROCEDURE — 97112 NEUROMUSCULAR REEDUCATION: CPT

## 2020-07-21 NOTE — PROGRESS NOTES
Daily Note     Today's date: 2020  Patient name: Michele Galo  : 1980  MRN: [id number]  Referring provider: Katy Benavides MD  Dx:   Encounter Diagnosis     ICD-10-CM    1  Acute left-sided low back pain without sciatica M54 5                   Subjective: Pt reports that it now depends on the day as far as pain  Yesterday he did not have much of any pain but  he was having pain  Notes that he can tolerate it and has been performing his HEP  Objective: See treatment diary below      Assessment: Tolerated treatment fair continuing to focus on core stability exercises this session  He kept describing his discomfort as a knot feeling as if he was laying on his keys in that low left back region  Patient demonstrated fatigue post treatment, exhibited good technique with therapeutic exercises and would benefit from continued PT  Updated pts HEP to end session  Plan: Continue per plan of care  Precautions: anxiety      Manuals       PA glides L4,L5  Gr IV NT           MET for anterior L SI   NC MM MM MM/ HY shotgun NC      Long axis lumbar distraction L   NC          Anterior innominate oscillation   NC    NC      L Piriformis TrP     MM        Neuro Re-Ed             TA Iso      10"x 15 10"x 15      TA BKFO      nv? 10 ea        TA March      12x ea 20x ea                                                          Ther Ex             Prone on elbows 1 min 3 min 3 min          Prone press ups 3x10 3x10 2x10          Press ups with OP 2x10 3x10           Standing Extension  10x           Posture review  5' 5'          Standing R slide glides  Inc symp           L hip flex iso   5"x10 5"x10 5" 2x10  5" 2x10      LTR   5"x10 ea 5"x10 ea 5"x10 ea 5" x10 ea 5"x10 ea      Prone alt arm/ leg   10x ea 10x ea LE         Superman prone   10x  10x 10x        Bridge with TB abd   RTB 2x10 RTB 2x10 RTB 2x10 RTB 2x10       Clamshells   RTB 2x10 RTB 2x10 RTB 2x10 Hooklying pball press    2x10 5" 2x10 5" 2x10 5"       TA SLR    2x10 2x10        Piriformis stretch     20"x4 ea 20"x4 ea 20"x4      TB opposite ext       btb 20x ea      Multif press       btb 20x ea      Ther Activity                                       Gait Training                                       Modalities

## 2020-07-22 ENCOUNTER — APPOINTMENT (OUTPATIENT)
Dept: PHYSICAL THERAPY | Facility: REHABILITATION | Age: 40
End: 2020-07-22
Payer: OTHER MISCELLANEOUS

## 2020-07-28 ENCOUNTER — APPOINTMENT (OUTPATIENT)
Dept: PHYSICAL THERAPY | Facility: REHABILITATION | Age: 40
End: 2020-07-28
Payer: OTHER MISCELLANEOUS

## 2020-07-30 ENCOUNTER — APPOINTMENT (OUTPATIENT)
Dept: PHYSICAL THERAPY | Facility: REHABILITATION | Age: 40
End: 2020-07-30
Payer: OTHER MISCELLANEOUS

## 2020-08-18 ENCOUNTER — EVALUATION (OUTPATIENT)
Dept: PHYSICAL THERAPY | Facility: REHABILITATION | Age: 40
End: 2020-08-18
Payer: OTHER MISCELLANEOUS

## 2020-08-18 DIAGNOSIS — M54.50 ACUTE LEFT-SIDED LOW BACK PAIN WITHOUT SCIATICA: Primary | ICD-10-CM

## 2020-08-18 DIAGNOSIS — S39.012D STRAIN OF LUMBAR REGION, SUBSEQUENT ENCOUNTER: ICD-10-CM

## 2020-08-18 PROCEDURE — 97140 MANUAL THERAPY 1/> REGIONS: CPT | Performed by: PHYSICAL MEDICINE & REHABILITATION

## 2020-08-18 PROCEDURE — 97110 THERAPEUTIC EXERCISES: CPT | Performed by: PHYSICAL MEDICINE & REHABILITATION

## 2020-08-18 PROCEDURE — 97164 PT RE-EVAL EST PLAN CARE: CPT | Performed by: PHYSICAL MEDICINE & REHABILITATION

## 2020-08-18 NOTE — PROGRESS NOTES
PT Re-Evaluation     Today's date: 2020  Patient name: Maritza Carroll  : 1980  MRN: [id number]  Referring provider: Alban Joaquin MD  Dx:   Encounter Diagnosis     ICD-10-CM    1  Acute left-sided low back pain without sciatica  M54 5    2  Strain of lumbar region, subsequent encounter  S39 012D        Start Time: 1030  Stop Time: 1120  Total time in clinic (min): 50 minutes    Assessment  Assessment details: Pt is a 38 y o  male presenting to outpatient physical therapy with left-sided low back pain  Pt presents with pain, decreased range of motion, decreased strength, and decreased tolerance to activity  Pt would benefit from skilled physical therapy to address limitations and to achieve goals  Thank you for this referral    Impairments: abnormal or restricted ROM, impaired physical strength, lacks appropriate home exercise program and pain with function    Symptom irritability: highUnderstanding of Dx/Px/POC: fair   Prognosis: fair    Plan  Plan details: Focus on muscular extensibility, joint mobility, work hardening/lumbar stabilization  Patient would benefit from: PT eval and skilled physical therapy  Planned modality interventions: biofeedback, cryotherapy, TENS and thermotherapy: hydrocollator packs  Planned therapy interventions: abdominal trunk stabilization, joint mobilization, manual therapy, neuromuscular re-education, patient education, postural training, strengthening, stretching, therapeutic activities, therapeutic exercise and home exercise program  Frequency: 2x week  Duration in weeks: 6  Treatment plan discussed with: patient        Subjective Evaluation    History of Present Illness  Mechanism of injury: Pt reports an episode of LBP > 8 years ago which was resolved following OPPT  This episode began on 2020  He was bending forward to remove trash from a can resulting in his entire low back "locking up"  He has not had a significant reduction in sxs with tx   He works in a physical job requiring lifting, carrying, 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  Hx of L foot fracture 5 years ago and he was in a walking boot for approximately 1 year  Current chief complaint of L low back that is described as squeezing/tightness  He has been out of work since the end of   He is required to lift up to 20#, his normal load is 10-15#  MRI results negative  Pain  Current pain ratin  At best pain ratin  At worst pain ratin  Progression: no change    Patient Goals  Patient goals for therapy: decreased pain and return to work          Objective     Active Range of Motion     Lumbar   Flexion: 65 degrees  with pain  Extension: 12 degrees   Left lateral flexion: 20 degrees       Right lateral flexion: 20 degrees  with pain    Passive Range of Motion   Left Hip   Flexion: 100 degrees with pain  External rotation (90/90): 20 degrees with pain  Internal rotation (90/90): 25 degrees     Right Hip   Flexion: 100 degrees with pain  External rotation (90/90): 35 degrees   Internal rotation (90/90): 25 degrees     Joint Play     Hypomobile: T8, T9, T10, T11, T12, L1, L2, L3, L4 and L5     Pain: L4 and L5     Tests     Left Hip   Positive DEMI  Negative FADIR  Right Hip   Negative DEMI and FADIR                Precautions: anxiety      Manuals             MFD (static/c prayer stetch/glide) NC            IASTM NC            Lumbar rotational mobs NC   Gr IV            Thoracic PA NC  Gr V            Neuro Re-Ed             Prone alt UE/LE             Multifidi press                                                                              Ther Ex             Elliptical              Piriformis stretch B             Hamstring stretch B             gastroc stretch B             pball squats             Hip hike to neutral                                       Ther Activity             Lifting mechanics with step ups             Suitcase carry             Jeny Comings carry             Gait Training                                       Modalities

## 2020-08-20 ENCOUNTER — OFFICE VISIT (OUTPATIENT)
Dept: PHYSICAL THERAPY | Facility: REHABILITATION | Age: 40
End: 2020-08-20
Payer: OTHER MISCELLANEOUS

## 2020-08-20 DIAGNOSIS — S39.012D STRAIN OF LUMBAR REGION, SUBSEQUENT ENCOUNTER: ICD-10-CM

## 2020-08-20 DIAGNOSIS — M54.50 ACUTE LEFT-SIDED LOW BACK PAIN WITHOUT SCIATICA: Primary | ICD-10-CM

## 2020-08-20 PROCEDURE — 97112 NEUROMUSCULAR REEDUCATION: CPT

## 2020-08-20 PROCEDURE — 97140 MANUAL THERAPY 1/> REGIONS: CPT

## 2020-08-20 PROCEDURE — 97110 THERAPEUTIC EXERCISES: CPT

## 2020-08-20 NOTE — PROGRESS NOTES
Daily Note     Today's date: 2020  Patient name: Awilda Bundy  : 1980  MRN: [id number]  Referring provider: Tariq Jean-Baptiste MD  Dx:   Encounter Diagnosis     ICD-10-CM    1  Acute left-sided low back pain without sciatica  M54 5    2  Strain of lumbar region, subsequent encounter  S39 012D                   Subjective: Pt states he is feeling okay today and was not to sore from his last session  Objective: See treatment diary below      Assessment: Tolerated treatment well as he fatigued form his session but had no increase in pain  Pt was educated on proper throughout session  Pt tolerated manual PT well with less discomfort along is left paraspinals  Patient would benefit from continued PT      Plan: Continue per plan of care        Precautions: anxiety      Manuals             MFD (static/c prayer stetch/glide) NC CF           IASTM NC CF           Lumbar rotational mobs NC   Gr IV np           Thoracic PA NC  Gr V np            Neuro Re-Ed             Prone alt UE/LE  3" x 10 ea            Multifidi press                                                                              Ther Ex             Elliptical   6'           Piriformis stretch B  3  X30"            Hamstring stretch B  3  X30"            gastroc stretch B  3  x 30"            pball squats  2  x10            Hip hike to neutral  10x            Cane squats   X 5                         Ther Activity             Lifting mechanics with step ups  nv            Suitcase carry  10# 2  Laps ea           [place] carry  10# 3 laps            Gait Training                                       Modalities

## 2020-08-24 ENCOUNTER — OFFICE VISIT (OUTPATIENT)
Dept: PHYSICAL THERAPY | Facility: REHABILITATION | Age: 40
End: 2020-08-24
Payer: OTHER MISCELLANEOUS

## 2020-08-24 DIAGNOSIS — M54.50 ACUTE LEFT-SIDED LOW BACK PAIN WITHOUT SCIATICA: Primary | ICD-10-CM

## 2020-08-24 DIAGNOSIS — S39.012D STRAIN OF LUMBAR REGION, SUBSEQUENT ENCOUNTER: ICD-10-CM

## 2020-08-24 PROCEDURE — 97110 THERAPEUTIC EXERCISES: CPT | Performed by: PHYSICAL MEDICINE & REHABILITATION

## 2020-08-24 PROCEDURE — 97112 NEUROMUSCULAR REEDUCATION: CPT | Performed by: PHYSICAL MEDICINE & REHABILITATION

## 2020-08-24 PROCEDURE — 97140 MANUAL THERAPY 1/> REGIONS: CPT | Performed by: PHYSICAL MEDICINE & REHABILITATION

## 2020-08-24 NOTE — PROGRESS NOTES
Daily Note     Today's date: 2020  Patient name: Nina Escobar  : 1980  MRN: [id number]  Referring provider: Gilbert Reyes MD  Dx:   Encounter Diagnosis     ICD-10-CM    1  Acute left-sided low back pain without sciatica  M54 5    2  Strain of lumbar region, subsequent encounter  S39 012D        Start Time: 0915  Stop Time: 1000  Total time in clinic (min): 45 minutes    Subjective: Pt reports experiencing muscular soreness following last tx  Objective: See treatment diary below      Assessment: Tolerated treatment well  Patient demonstrated fatigue post treatment, exhibited good technique with therapeutic exercises, would benefit from continued PT and improved quality and quantity of movement following manual tx  Plan: Progress treatment as tolerated         Precautions: anxiety      Manuals           MFD (static/c prayer stetch/glide) NC CF NC          IASTM NC CF NC          Lumbar rotational mobs NC   Gr IV np NC  Gr V          Thoracic PA NC  Gr V np            Neuro Re-Ed             Prone alt UE/LE  3" x 10 ea  15 ea c 3" hold          Multifidi press   30#  15 ea c 5" hold                                                                           Ther Ex             Elliptical   6' 6'          Piriformis stretch B  3  X30"  seated   3 x 30"          Hamstring stretch B  3  X30"            gastroc stretch B  3  x 30"            pball squats  2  x10            Hip hike to neutral  10x            Cane squats   X 5                         Ther Activity             Lifting mechanics with step ups  nv            Suitcase carry  10# 2  Laps ea           Dumont carry  10# 3 laps            Gait Training                                       Modalities

## 2020-08-25 ENCOUNTER — APPOINTMENT (OUTPATIENT)
Dept: PHYSICAL THERAPY | Facility: REHABILITATION | Age: 40
End: 2020-08-25
Payer: OTHER MISCELLANEOUS

## 2020-08-27 ENCOUNTER — OFFICE VISIT (OUTPATIENT)
Dept: PHYSICAL THERAPY | Facility: REHABILITATION | Age: 40
End: 2020-08-27
Payer: OTHER MISCELLANEOUS

## 2020-08-27 DIAGNOSIS — M54.50 ACUTE LEFT-SIDED LOW BACK PAIN WITHOUT SCIATICA: Primary | ICD-10-CM

## 2020-08-27 DIAGNOSIS — S39.012D STRAIN OF LUMBAR REGION, SUBSEQUENT ENCOUNTER: ICD-10-CM

## 2020-08-27 PROCEDURE — 97112 NEUROMUSCULAR REEDUCATION: CPT | Performed by: PHYSICAL MEDICINE & REHABILITATION

## 2020-08-27 PROCEDURE — 97110 THERAPEUTIC EXERCISES: CPT | Performed by: PHYSICAL MEDICINE & REHABILITATION

## 2020-08-27 PROCEDURE — 97140 MANUAL THERAPY 1/> REGIONS: CPT | Performed by: PHYSICAL MEDICINE & REHABILITATION

## 2020-08-27 NOTE — PROGRESS NOTES
Daily Note     Today's date: 2020  Patient name: Chandrakant Dudley  : 1980  MRN: [id number]  Referring provider: Shahid Everett MD  Dx:   Encounter Diagnosis     ICD-10-CM    1  Acute left-sided low back pain without sciatica  M54 5    2  Strain of lumbar region, subsequent encounter  S39 012D        Start Time: 1300  Stop Time: 1400  Total time in clinic (min): 60 minutes    Subjective: Pt states that overall he has noticed a reduction in pain and increased mobility  Objective: See treatment diary below      Assessment: Tolerated treatment well  Patient demonstrated fatigue post treatment, exhibited good technique with therapeutic exercises and would benefit from continued PT  Pt tolerated work hardening activities without onset of adverse sxs  Plan: Progress treatment as tolerated         Precautions: anxiety      Manuals          MFD (static/c prayer stetch/glide) NC CF NC NC         IASTM NC CF NC NC         Lumbar rotational mobs NC   Gr IV np NC  Gr V          Thoracic PA NC  Gr V np            Neuro Re-Ed             Prone alt UE/LE  3" x 10 ea  15 ea c 3" hold          Multifidi press   30#  15 ea c 5" hold                                                                           Ther Ex             Elliptical   6' 6' 7'         Piriformis stretch B  3  X30"  seated   3 x 30"          Hamstring stretch B  3  X30"            gastroc stretch B  3  x 30"            pball squats  2  x10            Hip hike to neutral  10x            Cane squats   X 5                         Ther Activity             Lifting mechanics with step ups  nv   Work hardening 6x 15#         Suitcase carry  10# 2  Laps ea  15#  4 laps         Dumont carry  10# 3 laps   15#  4 laps         Gait Training                                       Modalities

## 2020-09-02 ENCOUNTER — OFFICE VISIT (OUTPATIENT)
Dept: PHYSICAL THERAPY | Facility: REHABILITATION | Age: 40
End: 2020-09-02
Payer: OTHER MISCELLANEOUS

## 2020-09-02 DIAGNOSIS — M54.50 ACUTE LEFT-SIDED LOW BACK PAIN WITHOUT SCIATICA: Primary | ICD-10-CM

## 2020-09-02 DIAGNOSIS — S39.012D STRAIN OF LUMBAR REGION, SUBSEQUENT ENCOUNTER: ICD-10-CM

## 2020-09-02 PROCEDURE — 97110 THERAPEUTIC EXERCISES: CPT | Performed by: PHYSICAL MEDICINE & REHABILITATION

## 2020-09-02 PROCEDURE — 97530 THERAPEUTIC ACTIVITIES: CPT | Performed by: PHYSICAL MEDICINE & REHABILITATION

## 2020-09-02 PROCEDURE — 97140 MANUAL THERAPY 1/> REGIONS: CPT | Performed by: PHYSICAL MEDICINE & REHABILITATION

## 2020-09-02 NOTE — PROGRESS NOTES
Daily Note     Today's date: 2020  Patient name: Romeo Guerra  : 1980  MRN: [id number]  Referring provider: Tavo Machuca MD  Dx:   Encounter Diagnosis     ICD-10-CM    1  Acute left-sided low back pain without sciatica  M54 5    2  Strain of lumbar region, subsequent encounter  S39 012D        Start Time: 1115  Stop Time: 1200  Total time in clinic (min): 45 minutes    Subjective: Pt states that he has been feeling better  He has been compliant with HEP  Objective: See treatment diary below      Assessment: Tolerated treatment well  Patient demonstrated fatigue post treatment, exhibited good technique with therapeutic exercises, would benefit from continued PT and improved functional endurance  Plan: Progress treatment as tolerated         Precautions: anxiety      Manuals         MFD (static/c prayer stetch/glide) NC CF NC NC         IASTM NC CF NC NC NC        Lumbar rotational mobs NC   Gr IV np NC  Gr V          Thoracic PA NC  Gr V np            Neuro Re-Ed             Prone alt UE/LE  3" x 10 ea  15 ea c 3" hold          Multifidi press   30#  15 ea c 5" hold                                                                           Ther Ex             Elliptical   6' 6' 7' TM  2% incline  6', 2 0 mph        Piriformis stretch B  3  X30"  seated   3 x 30"  HEP        Hamstring stretch B  3  X30"    HEP        gastroc stretch B  3  x 30"    HEP        pball squats  2  x10    2 x 15        Hip hike to neutral  10x            Cane squats   X 5                         Ther Activity             Lifting mechanics with step ups  nv   Work hardening 6x 15# Work hardening  c step up 20#  2 x 10        Suitcase carry  10# 2  Laps ea  15#  4 laps 30#  4 laps        Dumont carry  10# 3 laps   15#  4 laps 20#/30#  4laps ea        Gait Training                                       Modalities

## 2020-09-04 ENCOUNTER — OFFICE VISIT (OUTPATIENT)
Dept: PHYSICAL THERAPY | Facility: REHABILITATION | Age: 40
End: 2020-09-04
Payer: OTHER MISCELLANEOUS

## 2020-09-04 DIAGNOSIS — S39.012D STRAIN OF LUMBAR REGION, SUBSEQUENT ENCOUNTER: ICD-10-CM

## 2020-09-04 DIAGNOSIS — M54.50 ACUTE LEFT-SIDED LOW BACK PAIN WITHOUT SCIATICA: Primary | ICD-10-CM

## 2020-09-04 PROCEDURE — 97140 MANUAL THERAPY 1/> REGIONS: CPT | Performed by: PHYSICAL THERAPIST

## 2020-09-04 PROCEDURE — 97110 THERAPEUTIC EXERCISES: CPT | Performed by: PHYSICAL THERAPIST

## 2020-09-04 PROCEDURE — 97112 NEUROMUSCULAR REEDUCATION: CPT | Performed by: PHYSICAL THERAPIST

## 2020-09-04 PROCEDURE — 97530 THERAPEUTIC ACTIVITIES: CPT | Performed by: PHYSICAL THERAPIST

## 2020-09-04 NOTE — PROGRESS NOTES
Daily Note     Today's date: 2020  Patient name: Deborah Woody  : 1980  MRN: [id number]  Referring provider: Herman Peterson MD  Dx:   Encounter Diagnosis     ICD-10-CM    1  Acute left-sided low back pain without sciatica  M54 5    2  Strain of lumbar region, subsequent encounter  S39 012D                   Subjective: Pt reports he feels he is continually improving with regards to his back pain  States he is scheduled to have injections next week in low back  Objective: See treatment diary below      Assessment: Tolerated treatment well  Denied pain throughout session  Patient demonstrated fatigue post treatment, exhibited good technique with therapeutic exercises and would benefit from continued PT      Plan: Progress treatment as tolerated         Precautions: anxiety      Manuals        MFD (static/c prayer stetch/glide) NC CF NC NC         IASTM NC CF NC NC NC np       Lumbar rotational mobs NC   Gr IV np NC  Gr V          Thoracic PA NC  Gr V np            Neuro Re-Ed             Prone alt UE/LE  3" x 10 ea  15 ea c 3" hold   15 ea c 3" hold       Multifidi press   30#  15 ea c 5" hold   30# 2x10 ea 5" hold                                                                        Ther Ex             Elliptical   6' 6' 7' TM  2% incline  6', 2 0 mph TM  2% incline  6', 2 0 mph       Piriformis stretch B  3  X30"  seated   3 x 30"  HEP        Hamstring stretch B  3  X30"    HEP        gastroc stretch B  3  x 30"    HEP        pball squats  2  x10    2 x 15 2x15       Hip hike to neutral  10x            Cane squats   X 5                         Ther Activity             Lifting mechanics with step ups  nv   Work hardening 6x 15# Work hardening  c step up 20#  2 x 10 Work hardening  c step up 20#  2 x 10       Suitcase carry  10# 2  Laps ea  15#  4 laps 30#  4 laps 30#   4 laps       Dumont carry  10# 3 laps   15#  4 laps 20#/30#  4laps ea 20#/30#  4laps ea Gait Training                                       Modalities

## 2020-09-10 ENCOUNTER — OFFICE VISIT (OUTPATIENT)
Dept: PHYSICAL THERAPY | Facility: REHABILITATION | Age: 40
End: 2020-09-10
Payer: OTHER MISCELLANEOUS

## 2020-09-10 DIAGNOSIS — S39.012D STRAIN OF LUMBAR REGION, SUBSEQUENT ENCOUNTER: ICD-10-CM

## 2020-09-10 DIAGNOSIS — M54.50 ACUTE LEFT-SIDED LOW BACK PAIN WITHOUT SCIATICA: Primary | ICD-10-CM

## 2020-09-10 PROCEDURE — 97112 NEUROMUSCULAR REEDUCATION: CPT

## 2020-09-10 PROCEDURE — 97110 THERAPEUTIC EXERCISES: CPT

## 2020-09-10 PROCEDURE — 97530 THERAPEUTIC ACTIVITIES: CPT

## 2020-09-10 NOTE — PROGRESS NOTES
Daily Note     Today's date: 9/10/2020  Patient name: Chandrakant Dudley  : 1980  MRN: [id number]  Referring provider: Shahid Everett MD  Dx:   Encounter Diagnosis     ICD-10-CM    1  Acute left-sided low back pain without sciatica  M54 5    2  Strain of lumbar region, subsequent encounter  S39 012D        Start Time: 1600  Stop Time: 1640  Total time in clinic (min): 40 minutes    Subjective: Pt reports he has a headache today, states he woke up with it and attributes it to allergies  Pt states he took some allergy meds and some tylenol to help with the headache  Objective: See treatment diary below      Assessment: Tolerated treatment well  Denied pain throughout session  Patient demonstrated fatigue post treatment, exhibited good technique with therapeutic exercises and would benefit from continued PT  Pt limited due to headache today, no signs of increased pain or adverse symptoms  Pt will benefit from further skilled PT to increase strength, flexibility, and function  Continue to progress as able  Plan: Progress treatment as tolerated         Precautions: anxiety      Manuals 8/18 8/20  8/24 8/27 9/2 9/4 9/10      MFD (static/c prayer stetch/glide) NC CF NC NC         IASTM NC CF NC NC NC np np      Lumbar rotational mobs NC   Gr IV np NC  Gr V          Thoracic PA NC  Gr V np            Neuro Re-Ed             Prone alt UE/LE  3" x 10 ea  15 ea c 3" hold   15 ea c 3" hold nv      Multifidi press   30#  15 ea c 5" hold   30# 2x10 ea 5" hold nv                                                                       Ther Ex             Elliptical   6' 6' 7' TM  2% incline  6', 2 0 mph TM  2% incline  6', 2 0 mph TM  2% incline  7', 2 0 mph      Piriformis stretch B  3  X30"  seated   3 x 30"  HEP        Hamstring stretch B  3  X30"    HEP        gastroc stretch B  3  x 30"    HEP        pball squats  2  x10    2 x 15 2x15 2x10       Hip hike to neutral  10x            Cane squats   X 5 Ther Activity             Lifting mechanics with step ups  nv   Work hardening 6x 15# Work hardening  c step up 20#  2 x 10 Work hardening  c step up 20#  2 x 10 Work hardening  c step up 20#  2 x 10      Suitcase carry  10# 2  Laps ea  15#  4 laps 30#  4 laps 30#   4 laps 30#   4 laps      Dumont carry  10# 3 laps   15#  4 laps 20#/30#  4laps ea 20#/30#  4laps ea 20#/30#  4laps ea                   Gait Training                                       Modalities

## 2020-09-16 ENCOUNTER — OFFICE VISIT (OUTPATIENT)
Dept: PHYSICAL THERAPY | Facility: REHABILITATION | Age: 40
End: 2020-09-16
Payer: OTHER MISCELLANEOUS

## 2020-09-16 DIAGNOSIS — M54.50 ACUTE LEFT-SIDED LOW BACK PAIN WITHOUT SCIATICA: Primary | ICD-10-CM

## 2020-09-16 DIAGNOSIS — S39.012D STRAIN OF LUMBAR REGION, SUBSEQUENT ENCOUNTER: ICD-10-CM

## 2020-09-16 PROCEDURE — 97140 MANUAL THERAPY 1/> REGIONS: CPT | Performed by: PHYSICAL MEDICINE & REHABILITATION

## 2020-09-16 PROCEDURE — 97112 NEUROMUSCULAR REEDUCATION: CPT | Performed by: PHYSICAL MEDICINE & REHABILITATION

## 2020-09-16 PROCEDURE — 97110 THERAPEUTIC EXERCISES: CPT | Performed by: PHYSICAL MEDICINE & REHABILITATION

## 2020-09-16 NOTE — PROGRESS NOTES
Daily Note     Today's date: 2020  Patient name: Marisel Ho  : 1980  MRN: [id number]  Referring provider: Mariza Pak MD  Dx:   Encounter Diagnosis     ICD-10-CM    1  Acute left-sided low back pain without sciatica  M54 5    2  Strain of lumbar region, subsequent encounter  S39 012D        Start Time: 1345  Stop Time: 1430  Total time in clinic (min): 45 minutes    Subjective: Pt states that his back has been feeling pretty good, he woke up today with pain in the mid thoracic region that he describes as muscle irritation  Objective: See treatment diary below      Assessment: Tolerated treatment well  Patient demonstrated fatigue post treatment, exhibited good technique with therapeutic exercises, would benefit from continued PT and increased muscular tesion with palpation to the R paraspinals and rhomboids  Plan: Progress treatment as tolerated         Precautions: anxiety      Manuals 8/18 8/20  8/24 8/27 9/2 9/4 9/10 9/16     MFD (static/c prayer stetch/glide) NC CF NC NC         IASTM NC CF NC NC NC np np      Lumbar rotational mobs NC   Gr IV np NC  Gr V          STM        NC (rhomboid/paraspinals)     Thoracic PA NC  Gr V np            Neuro Re-Ed             Prone alt UE/LE  3" x 10 ea  15 ea c 3" hold   15 ea c 3" hold nv      Multifidi press   30#  15 ea c 5" hold   30# 2x10 ea 5" hold nv 30#  2 x 10 ea   5" hold                                                                      Ther Ex             Elliptical   6' 6' 7' TM  2% incline  6', 2 0 mph TM  2% incline  6', 2 0 mph TM  2% incline  7', 2 0 mph Elliptical  10'     Piriformis stretch B  3  X30"  seated   3 x 30"  HEP        Rhomboid stretch        5 x 20"     Hamstring stretch B  3  X30"    HEP        gastroc stretch B  3  x 30"    HEP        pball squats  2  x10    2 x 15 2x15 2x10       Hip hike to neutral  10x            Cane squats   X 5            Shoulder extension        25#  2 x 12     Ther Activity Lifting mechanics with step ups  nv   Work hardening 6x 15# Work hardening  c step up 20#  2 x 10 Work hardening  c step up 20#  2 x 10 Work hardening  c step up 20#  2 x 10      Suitcase carry  10# 2  Laps ea  15#  4 laps 30#  4 laps 30#   4 laps 30#   4 laps      Dumont carry  10# 3 laps   15#  4 laps 20#/30#  4laps ea 20#/30#  4laps ea 20#/30#  4laps ea                   Gait Training                                       Modalities

## 2020-09-17 ENCOUNTER — OFFICE VISIT (OUTPATIENT)
Dept: PHYSICAL THERAPY | Facility: REHABILITATION | Age: 40
End: 2020-09-17
Payer: OTHER MISCELLANEOUS

## 2020-09-17 DIAGNOSIS — M54.50 ACUTE LEFT-SIDED LOW BACK PAIN WITHOUT SCIATICA: Primary | ICD-10-CM

## 2020-09-17 DIAGNOSIS — S39.012D STRAIN OF LUMBAR REGION, SUBSEQUENT ENCOUNTER: ICD-10-CM

## 2020-09-17 PROCEDURE — 97112 NEUROMUSCULAR REEDUCATION: CPT

## 2020-09-17 PROCEDURE — 97110 THERAPEUTIC EXERCISES: CPT

## 2020-09-17 NOTE — PROGRESS NOTES
Daily Note     Today's date: 2020  Patient name: Cuong Sherwood  : 1980  MRN: [id number]  Referring provider: Maria Fernanda Arrington MD  Dx:   Encounter Diagnosis     ICD-10-CM    1  Acute left-sided low back pain without sciatica  M54 5    2  Strain of lumbar region, subsequent encounter  S39 012D        Start Time: 1345  Stop Time: 1425  Total time in clinic (min): 40 minutes    Subjective: Pt states that he is feeling better than yesterday, states he still has some mid back pain but overall better  Objective: See treatment diary below      Assessment: Tolerated treatment well  Patient demonstrated fatigue post treatment, exhibited good technique with therapeutic exercises and would benefit from continued PT  Pt continues to work towards goals of increased core strength and NM control  Pt will benefit from further skilled PT to increase strength, flexibility and function  Continue to progress as able  Plan: Progress treatment as tolerated         Precautions: anxiety      Manuals 8/18 8/20  8/24 8/27 9/2 9/4 9/10 9/16 9/17    MFD (static/c prayer stetch/glide) NC CF NC NC         IASTM NC CF NC NC NC np np      Lumbar rotational mobs NC   Gr IV np NC  Gr V          STM        NC (rhomboid/paraspinals) np    Thoracic PA NC  Gr V np            Neuro Re-Ed             Prone alt UE/LE  3" x 10 ea  15 ea c 3" hold   15 ea c 3" hold nv      Multifidi press   30#  15 ea c 5" hold   30# 2x10 ea 5" hold nv 30#  2 x 10 ea   5" hold 30#  2 x 10 ea   5" hold                                                                     Ther Ex             Elliptical   6' 6' 7' TM  2% incline  6', 2 0 mph TM  2% incline  6', 2 0 mph TM  2% incline  7', 2 0 mph Elliptical  10' Elliptical  L3 10'    Piriformis stretch B  3  X30"  seated   3 x 30"  HEP        Rhomboid stretch        5 x 20"     Hamstring stretch B  3  X30"    HEP        gastroc stretch B  3  x 30"    HEP        pball squats  2  x10    2 x 15 2x15 2x10 Hip hike to neutral  10x            Cane squats   X 5            Shoulder extension        25#  2 x 12 25#  2 x 12    Ther Activity             Lifting mechanics with step ups  nv   Work hardening 6x 15# Work hardening  c step up 20#  2 x 10 Work hardening  c step up 20#  2 x 10 Work hardening  c step up 20#  2 x 10  Work hardening  c step up 20#  2 x 10    Suitcase carry  10# 2  Laps ea  15#  4 laps 30#  4 laps 30#   4 laps 30#   4 laps  30#   4 laps    Dumont carry  10# 3 laps   15#  4 laps 20#/30#  4laps ea 20#/30#  4laps ea 20#/30#  4laps ea  30#  4laps ea                 Gait Training                                       Modalities

## 2020-09-21 ENCOUNTER — OFFICE VISIT (OUTPATIENT)
Dept: PHYSICAL THERAPY | Facility: REHABILITATION | Age: 40
End: 2020-09-21
Payer: OTHER MISCELLANEOUS

## 2020-09-21 DIAGNOSIS — M54.50 ACUTE LEFT-SIDED LOW BACK PAIN WITHOUT SCIATICA: Primary | ICD-10-CM

## 2020-09-21 DIAGNOSIS — S39.012D STRAIN OF LUMBAR REGION, SUBSEQUENT ENCOUNTER: ICD-10-CM

## 2020-09-21 PROCEDURE — 97530 THERAPEUTIC ACTIVITIES: CPT

## 2020-09-21 PROCEDURE — 97110 THERAPEUTIC EXERCISES: CPT

## 2020-09-21 PROCEDURE — 97112 NEUROMUSCULAR REEDUCATION: CPT

## 2020-09-21 NOTE — PROGRESS NOTES
Daily Note     Today's date: 2020  Patient name: Belinda Smith  : 1980  MRN: [id number]  Referring provider: Roxanne Espino MD  Dx:   Encounter Diagnosis     ICD-10-CM    1  Acute left-sided low back pain without sciatica  M54 5    2  Strain of lumbar region, subsequent encounter  S39 012D        Start Time: 1530  Stop Time: 1600  Total time in clinic (min): 30 minutes    Subjective: Pt presents to PT 15 minutes late, able to accommodate with shortened session  Pt states that he is feeling better overall, states he still has some mid back pain but overall better  Objective: See treatment diary below      Assessment: Tolerated treatment well  Patient demonstrated fatigue post treatment, exhibited good technique with therapeutic exercises and would benefit from continued PT  Pt continues to work towards goals of increased core strength and NM control  Pt will benefit from further skilled PT to increase strength, flexibility and function  Continue to progress as able  Plan: Progress treatment as tolerated         Precautions: anxiety      Manuals 8/18 8/20  8/24 8/27 9/2 9/4 9/10 9/16 9/17 9/21   MFD (static/c prayer stetch/glide) NC CF NC NC         IASTM NC CF NC NC NC np np      Lumbar rotational mobs NC   Gr IV np NC  Gr V          STM        NC (rhomboid/paraspinals) np    Thoracic PA NC  Gr V np            Neuro Re-Ed             Prone alt UE/LE  3" x 10 ea  15 ea c 3" hold   15 ea c 3" hold nv      Multifidi press   30#  15 ea c 5" hold   30# 2x10 ea 5" hold nv 30#  2 x 10 ea   5" hold 30#  2 x 10 ea   5" hold 30#  2 x 10 ea   5" hold                                                                    Ther Ex             Elliptical   6' 6' 7' TM  2% incline  6', 2 0 mph TM  2% incline  6', 2 0 mph TM  2% incline  7', 2 0 mph Elliptical  10' Elliptical  L3 10' Elliptical  L3 10'   Piriformis stretch B  3  X30"  seated   3 x 30"  HEP        Rhomboid stretch        5 x 20"     Hamstring stretch B  3  X30"    HEP        gastroc stretch B  3  x 30"    HEP        pball squats  2  x10    2 x 15 2x15 2x10       Hip hike to neutral  10x            Cane squats   X 5            Shoulder extension        25#  2 x 12 25#  2 x 12 30# 2x12   Ther Activity             Lifting mechanics with step ups  nv   Work hardening 6x 15# Work hardening  c step up 20#  2 x 10 Work hardening  c step up 20#  2 x 10 Work hardening  c step up 20#  2 x 10  Work hardening  c step up 20#  2 x 10 Work hardening  c step up 20#  2 x 10   Suitcase carry  10# 2  Laps ea  15#  4 laps 30#  4 laps 30#   4 laps 30#   4 laps  30#   4 laps 30#   4 laps   Dumont carry  10# 3 laps   15#  4 laps 20#/30#  4laps ea 20#/30#  4laps ea 20#/30#  4laps ea  30#  4laps ea 30#  4laps ea                Gait Training                                       Modalities

## 2020-09-28 ENCOUNTER — OFFICE VISIT (OUTPATIENT)
Dept: PHYSICAL THERAPY | Facility: REHABILITATION | Age: 40
End: 2020-09-28
Payer: OTHER MISCELLANEOUS

## 2020-09-28 DIAGNOSIS — M54.50 ACUTE LEFT-SIDED LOW BACK PAIN WITHOUT SCIATICA: Primary | ICD-10-CM

## 2020-09-28 DIAGNOSIS — S39.012D STRAIN OF LUMBAR REGION, SUBSEQUENT ENCOUNTER: ICD-10-CM

## 2020-09-28 PROCEDURE — 97112 NEUROMUSCULAR REEDUCATION: CPT

## 2020-09-28 PROCEDURE — 97110 THERAPEUTIC EXERCISES: CPT

## 2020-09-28 PROCEDURE — 97530 THERAPEUTIC ACTIVITIES: CPT

## 2020-09-28 NOTE — PROGRESS NOTES
Daily Note     Today's date: 2020  Patient name: Deborah Woody  : 1980  MRN: [id number]  Referring provider: Herman Peterson MD  Dx:   Encounter Diagnosis     ICD-10-CM    1  Acute left-sided low back pain without sciatica  M54 5    2  Strain of lumbar region, subsequent encounter  S39 012D                   Subjective: Pt reports with c/o increased pain in mid back which he began about 2-3 weeks  He denied change in activity  He reported overall improvement sinc ebeginning therapy       Objective: See treatment diary below      Assessment: Tolerated treatment well  Good recall with current program  Patient demonstrated fatigue post treatment, exhibited good technique with therapeutic exercises and would benefit from continued PT      Plan: Continue per plan of care  Progress treatment as tolerated         Precautions: anxiety      Manuals 9/28 8/20  8/24 8/27 9/2 9/4 9/10 9/16 9/17 9/21   MFD (static/c prayer stetch/glide)  CF NC NC         IASTM  CF NC NC NC np np      Lumbar rotational mobs  np NC  Gr V          STM        NC (rhomboid/paraspinals) np    Thoracic PA  np            Neuro Re-Ed             Prone alt UE/LE nv 3" x 10 ea  15 ea c 3" hold   15 ea c 3" hold nv      Multifidi press 30# 5"hold 2x15 ea  30#  15 ea c 5" hold   30# 2x10 ea 5" hold nv 30#  2 x 10 ea   5" hold 30#  2 x 10 ea   5" hold 30#  2 x 10 ea   5" hold                                                                    Ther Ex             Elliptical  TM  2% incline  8', 2 0 mph 6' 6' 7' TM  2% incline  6', 2 0 mph TM  2% incline  6', 2 0 mph TM  2% incline  7', 2 0 mph Elliptical  10' Elliptical  L3 10' Elliptical  L3 10'   Piriformis stretch B  3  X30"  seated   3 x 30"  HEP        Rhomboid stretch        5 x 20" 30"x3    Hamstring stretch B  3  X30"    HEP        gastroc stretch B  3  x 30"    HEP        pball squats  2  x10    2 x 15 2x15 2x10       Hip hike to neutral  10x            Cane squats   X 5 Shoulder extension 30# 2x15       25#  2 x 12 25#  2 x 12 30# 2x12   Ther Activity             Lifting mechanics with step ups Work hardening  c step up 20#  2 x 10 nv   Work hardening 6x 15# Work hardening  c step up 20#  2 x 10 Work hardening  c step up 20#  2 x 10 Work hardening  c step up 20#  2 x 10  Work hardening  c step up 20#  2 x 10 Work hardening  c step up 20#  2 x 10   Suitcase carry 30#   4 laps 10# 2  Laps ea  15#  4 laps 30#  4 laps 30#   4 laps 30#   4 laps  30#   4 laps 30#   4 laps   Dumont carry 30#   4 laps 10# 3 laps   15#  4 laps 20#/30#  4laps ea 20#/30#  4laps ea 20#/30#  4laps ea  30#  4laps ea 30#  4laps ea                Gait Training                                       Modalities

## 2020-09-29 ENCOUNTER — APPOINTMENT (OUTPATIENT)
Dept: LAB | Age: 40
End: 2020-09-29
Payer: COMMERCIAL

## 2020-09-29 DIAGNOSIS — F41.9 ANXIETY: ICD-10-CM

## 2020-09-29 DIAGNOSIS — Z13.6 SCREENING FOR CARDIOVASCULAR CONDITION: ICD-10-CM

## 2020-09-29 DIAGNOSIS — E66.09 CLASS 1 OBESITY DUE TO EXCESS CALORIES WITHOUT SERIOUS COMORBIDITY WITH BODY MASS INDEX (BMI) OF 33.0 TO 33.9 IN ADULT: ICD-10-CM

## 2020-09-29 LAB
ALBUMIN SERPL BCP-MCNC: 4.1 G/DL (ref 3.5–5)
ALP SERPL-CCNC: 58 U/L (ref 46–116)
ALT SERPL W P-5'-P-CCNC: 63 U/L (ref 12–78)
ANION GAP SERPL CALCULATED.3IONS-SCNC: 5 MMOL/L (ref 4–13)
AST SERPL W P-5'-P-CCNC: 24 U/L (ref 5–45)
BASOPHILS # BLD AUTO: 0.03 THOUSANDS/ΜL (ref 0–0.1)
BASOPHILS NFR BLD AUTO: 0 % (ref 0–1)
BILIRUB SERPL-MCNC: 0.84 MG/DL (ref 0.2–1)
BUN SERPL-MCNC: 22 MG/DL (ref 5–25)
CALCIUM SERPL-MCNC: 9 MG/DL (ref 8.3–10.1)
CHLORIDE SERPL-SCNC: 105 MMOL/L (ref 100–108)
CHOLEST SERPL-MCNC: 171 MG/DL (ref 50–200)
CO2 SERPL-SCNC: 29 MMOL/L (ref 21–32)
CREAT SERPL-MCNC: 0.71 MG/DL (ref 0.6–1.3)
EOSINOPHIL # BLD AUTO: 0.05 THOUSAND/ΜL (ref 0–0.61)
EOSINOPHIL NFR BLD AUTO: 1 % (ref 0–6)
ERYTHROCYTE [DISTWIDTH] IN BLOOD BY AUTOMATED COUNT: 13.2 % (ref 11.6–15.1)
GFR SERPL CREATININE-BSD FRML MDRD: 117 ML/MIN/1.73SQ M
GLUCOSE P FAST SERPL-MCNC: 108 MG/DL (ref 65–99)
HCT VFR BLD AUTO: 43.6 % (ref 36.5–49.3)
HDLC SERPL-MCNC: 48 MG/DL
HGB BLD-MCNC: 14.8 G/DL (ref 12–17)
IMM GRANULOCYTES # BLD AUTO: 0.03 THOUSAND/UL (ref 0–0.2)
IMM GRANULOCYTES NFR BLD AUTO: 0 % (ref 0–2)
LDLC SERPL CALC-MCNC: 108 MG/DL (ref 0–100)
LYMPHOCYTES # BLD AUTO: 2.25 THOUSANDS/ΜL (ref 0.6–4.47)
LYMPHOCYTES NFR BLD AUTO: 28 % (ref 14–44)
MCH RBC QN AUTO: 29.2 PG (ref 26.8–34.3)
MCHC RBC AUTO-ENTMCNC: 33.9 G/DL (ref 31.4–37.4)
MCV RBC AUTO: 86 FL (ref 82–98)
MONOCYTES # BLD AUTO: 0.75 THOUSAND/ΜL (ref 0.17–1.22)
MONOCYTES NFR BLD AUTO: 9 % (ref 4–12)
NEUTROPHILS # BLD AUTO: 5.04 THOUSANDS/ΜL (ref 1.85–7.62)
NEUTS SEG NFR BLD AUTO: 62 % (ref 43–75)
NONHDLC SERPL-MCNC: 123 MG/DL
NRBC BLD AUTO-RTO: 0 /100 WBCS
PLATELET # BLD AUTO: 248 THOUSANDS/UL (ref 149–390)
PMV BLD AUTO: 11.4 FL (ref 8.9–12.7)
POTASSIUM SERPL-SCNC: 4.8 MMOL/L (ref 3.5–5.3)
PROT SERPL-MCNC: 7.5 G/DL (ref 6.4–8.2)
RBC # BLD AUTO: 5.06 MILLION/UL (ref 3.88–5.62)
SODIUM SERPL-SCNC: 139 MMOL/L (ref 136–145)
TRIGL SERPL-MCNC: 76 MG/DL
TSH SERPL DL<=0.05 MIU/L-ACNC: 1.58 UIU/ML (ref 0.36–3.74)
WBC # BLD AUTO: 8.15 THOUSAND/UL (ref 4.31–10.16)

## 2020-09-29 PROCEDURE — 36415 COLL VENOUS BLD VENIPUNCTURE: CPT

## 2020-09-29 PROCEDURE — 84443 ASSAY THYROID STIM HORMONE: CPT

## 2020-09-29 PROCEDURE — 80061 LIPID PANEL: CPT

## 2020-09-29 PROCEDURE — 80053 COMPREHEN METABOLIC PANEL: CPT

## 2020-09-29 PROCEDURE — 85025 COMPLETE CBC W/AUTO DIFF WBC: CPT

## 2020-09-30 ENCOUNTER — TELEPHONE (OUTPATIENT)
Dept: FAMILY MEDICINE CLINIC | Facility: CLINIC | Age: 40
End: 2020-09-30

## 2020-09-30 NOTE — TELEPHONE ENCOUNTER
----- Message from Jami Valadez MD sent at 9/29/2020  9:03 PM EDT -----  Please call patient with blood test results  Blood count ,thyroid function test ,liver enzymes ,kidney function test, lipid panel - within normal range  Fasting blood sugar 108 ( N )  Recommended to follow a low carb diet, avoid concentrated sweets  Encouraged regular exercise

## 2020-10-01 ENCOUNTER — OFFICE VISIT (OUTPATIENT)
Dept: PHYSICAL THERAPY | Facility: REHABILITATION | Age: 40
End: 2020-10-01
Payer: OTHER MISCELLANEOUS

## 2020-10-01 DIAGNOSIS — M54.50 ACUTE LEFT-SIDED LOW BACK PAIN WITHOUT SCIATICA: Primary | ICD-10-CM

## 2020-10-01 DIAGNOSIS — S39.012D STRAIN OF LUMBAR REGION, SUBSEQUENT ENCOUNTER: ICD-10-CM

## 2020-10-01 PROCEDURE — 97530 THERAPEUTIC ACTIVITIES: CPT

## 2020-10-01 PROCEDURE — 97112 NEUROMUSCULAR REEDUCATION: CPT

## 2020-10-01 PROCEDURE — 97110 THERAPEUTIC EXERCISES: CPT

## 2020-10-06 ENCOUNTER — OFFICE VISIT (OUTPATIENT)
Dept: PHYSICAL THERAPY | Facility: REHABILITATION | Age: 40
End: 2020-10-06
Payer: OTHER MISCELLANEOUS

## 2020-10-06 DIAGNOSIS — S39.012D STRAIN OF LUMBAR REGION, SUBSEQUENT ENCOUNTER: ICD-10-CM

## 2020-10-06 DIAGNOSIS — M54.50 ACUTE LEFT-SIDED LOW BACK PAIN WITHOUT SCIATICA: Primary | ICD-10-CM

## 2020-10-06 PROCEDURE — 97110 THERAPEUTIC EXERCISES: CPT

## 2020-10-06 PROCEDURE — 97530 THERAPEUTIC ACTIVITIES: CPT

## 2020-10-06 PROCEDURE — 97112 NEUROMUSCULAR REEDUCATION: CPT

## 2020-10-07 ENCOUNTER — TRANSCRIBE ORDERS (OUTPATIENT)
Dept: ADMINISTRATIVE | Age: 40
End: 2020-10-07

## 2020-10-07 ENCOUNTER — OCCMED (OUTPATIENT)
Dept: URGENT CARE | Age: 40
End: 2020-10-07

## 2020-10-07 DIAGNOSIS — Z02.1 PHYSICAL EXAM, PRE-EMPLOYMENT: Primary | ICD-10-CM

## 2020-10-07 DIAGNOSIS — Z02.1 PHYSICAL EXAM, PRE-EMPLOYMENT: ICD-10-CM

## 2020-10-07 PROCEDURE — U0003 INFECTIOUS AGENT DETECTION BY NUCLEIC ACID (DNA OR RNA); SEVERE ACUTE RESPIRATORY SYNDROME CORONAVIRUS 2 (SARS-COV-2) (CORONAVIRUS DISEASE [COVID-19]), AMPLIFIED PROBE TECHNIQUE, MAKING USE OF HIGH THROUGHPUT TECHNOLOGIES AS DESCRIBED BY CMS-2020-01-R: HCPCS

## 2020-10-09 ENCOUNTER — TELEPHONE (OUTPATIENT)
Dept: URGENT CARE | Age: 40
End: 2020-10-09

## 2020-10-09 LAB — SARS-COV-2 RNA SPEC QL NAA+PROBE: NOT DETECTED

## 2021-03-08 ENCOUNTER — OFFICE VISIT (OUTPATIENT)
Dept: URGENT CARE | Age: 41
End: 2021-03-08
Payer: COMMERCIAL

## 2021-03-08 VITALS — HEART RATE: 94 BPM | TEMPERATURE: 97.3 F | RESPIRATION RATE: 18 BRPM | OXYGEN SATURATION: 98 %

## 2021-03-08 DIAGNOSIS — J45.21 MILD INTERMITTENT ASTHMA WITH ACUTE EXACERBATION: Primary | ICD-10-CM

## 2021-03-08 PROCEDURE — 99213 OFFICE O/P EST LOW 20 MIN: CPT | Performed by: FAMILY MEDICINE

## 2021-03-08 RX ORDER — ALBUTEROL SULFATE 90 UG/1
2 AEROSOL, METERED RESPIRATORY (INHALATION) EVERY 6 HOURS PRN
Qty: 1 INHALER | Refills: 0 | Status: SHIPPED | OUTPATIENT
Start: 2021-03-08 | End: 2021-08-14 | Stop reason: ALTCHOICE

## 2021-03-08 RX ORDER — PREDNISONE 50 MG/1
50 TABLET ORAL DAILY
Qty: 5 TABLET | Refills: 0 | Status: SHIPPED | OUTPATIENT
Start: 2021-03-08 | End: 2021-03-13

## 2021-03-08 NOTE — PROGRESS NOTES
[facility]        NAME: China Triplett is a 38 y o  male  : 1980    MRN: [id number]  DATE: 2021  TIME: 3:42 PM    Assessment and Plan   Mild intermittent asthma with acute exacerbation [J45 21]  1  Mild intermittent asthma with acute exacerbation  predniSONE 50 mg tablet    albuterol (PROVENTIL HFA,VENTOLIN HFA) 90 mcg/act inhaler         Patient Instructions     Stop taking the over-the-counter Primatene mist inhaler  Follow up with PCP in 3-5 days  Proceed to  ER if symptoms worsen  Chief Complaint     Chief Complaint   Patient presents with    Shortness of Breath     pt states his allergies are "acting up" and he has been using an OTC inhaler becuase he ran out of his ALbuterol inhaler    "like liquid in my lungs"  and "tightness like an asthma attack"          History of Present Illness       Shortness of Breath (pt states his allergies are "acting up" and he has been using an OTC inhaler becuase he ran out of his ALbuterol inhaler    "like liquid in my lungs" and "tightness like an asthma attack" )    Shortness of Breath  This is a new problem  The current episode started today  The problem occurs constantly  The problem has been unchanged  Associated symptoms include wheezing  The symptoms are aggravated by pollens and weather changes  Review of Systems   Review of Systems   Constitutional: Negative  HENT: Positive for congestion  Respiratory: Positive for shortness of breath and wheezing  Cardiovascular: Negative            Current Medications       Current Outpatient Medications:     albuterol (PROVENTIL HFA,VENTOLIN HFA) 90 mcg/act inhaler, Inhale 2 puffs every 6 (six) hours as needed for wheezing, Disp: , Rfl:     ALPRAZolam (XANAX) 0 25 mg tablet, Take 1 tablet (0 25 mg total) by mouth 2 (two) times a day as needed for anxiety, Disp: 60 tablet, Rfl: 3    cetirizine (ZyrTEC) 10 mg tablet, Take 1 tablet (10 mg total) by mouth daily, Disp: 30 tablet, Rfl: 5   multivitamin (THERAGRAN) TABS, Take 1 tablet by mouth daily, Disp: , Rfl:     albuterol (PROVENTIL HFA,VENTOLIN HFA) 90 mcg/act inhaler, Inhale 2 puffs every 6 (six) hours as needed for wheezing, Disp: 1 Inhaler, Rfl: 0    citalopram (CeleXA) 20 mg tablet, Take 1/2 tab  daily for 1 week, them increase to 1 tab  daily (Patient not taking: Reported on 3/8/2021), Disp: 30 tablet, Rfl: 5    fluticasone (FLONASE) 50 mcg/act nasal spray, 2 sprays into each nostril daily (Patient not taking: Reported on 6/29/2020), Disp: 1 Bottle, Rfl: 1    predniSONE 50 mg tablet, Take 1 tablet (50 mg total) by mouth daily for 5 days, Disp: 5 tablet, Rfl: 0    Current Allergies     Allergies as of 03/08/2021 - Reviewed 03/08/2021   Allergen Reaction Noted    Chocolate Sneezing 11/28/2017            The following portions of the patient's history were reviewed and updated as appropriate: allergies, current medications, past family history, past medical history, past social history, past surgical history and problem list      Past Medical History:   Diagnosis Date    Allergic     Anxiety     Asthma     Obesity        Past Surgical History:   Procedure Laterality Date    MYRINGOTOMY W/ TUBES         Family History   Problem Relation Age of Onset    Diabetes Mother     Hypertension Mother     Alcohol abuse Father          Medications have been verified  Objective   Pulse 94   Temp (!) 97 3 °F (36 3 °C)   Resp 18   SpO2 98%   No LMP for male patient  Physical Exam     Physical Exam  Constitutional:       Appearance: He is well-developed  HENT:      Right Ear: External ear normal       Left Ear: External ear normal       Nose: Nose normal       Mouth/Throat:      Pharynx: No oropharyngeal exudate  Eyes:      Conjunctiva/sclera: Conjunctivae normal    Neck:      Musculoskeletal: Normal range of motion and neck supple  Cardiovascular:      Rate and Rhythm: Normal rate and regular rhythm        Heart sounds: Normal heart sounds  No murmur  Pulmonary:      Effort: Pulmonary effort is normal  No respiratory distress  Breath sounds: Examination of the right-lower field reveals wheezing  Examination of the left-lower field reveals wheezing  Wheezing present  No rales  Chest:      Chest wall: No tenderness  Lymphadenopathy:      Cervical: No cervical adenopathy

## 2021-03-08 NOTE — PATIENT INSTRUCTIONS
Stop taking the over-the-counter Primatene mist inhaler  Follow up with PCP in 3-5 days  Proceed to  ER if symptoms worsen

## 2021-03-08 NOTE — LETTER
March 8, 2021     Patient: Denis Davidson   YOB: 1980   Date of Visit: 3/8/2021       To Whom it May Concern:    Jomar Stiles was seen in my clinic on 3/8/2021  If you have any questions or concerns, please don't hesitate to call           Sincerely,          DANDRE REDD        CC: No Recipients

## 2021-03-13 ENCOUNTER — OFFICE VISIT (OUTPATIENT)
Dept: URGENT CARE | Age: 41
End: 2021-03-13
Payer: COMMERCIAL

## 2021-03-13 VITALS
OXYGEN SATURATION: 98 % | HEIGHT: 75 IN | BODY MASS INDEX: 33.57 KG/M2 | HEART RATE: 90 BPM | TEMPERATURE: 97.9 F | RESPIRATION RATE: 18 BRPM | WEIGHT: 270 LBS

## 2021-03-13 DIAGNOSIS — J06.9 ACUTE URI: Primary | ICD-10-CM

## 2021-03-13 PROCEDURE — U0003 INFECTIOUS AGENT DETECTION BY NUCLEIC ACID (DNA OR RNA); SEVERE ACUTE RESPIRATORY SYNDROME CORONAVIRUS 2 (SARS-COV-2) (CORONAVIRUS DISEASE [COVID-19]), AMPLIFIED PROBE TECHNIQUE, MAKING USE OF HIGH THROUGHPUT TECHNOLOGIES AS DESCRIBED BY CMS-2020-01-R: HCPCS | Performed by: PHYSICIAN ASSISTANT

## 2021-03-13 PROCEDURE — U0005 INFEC AGEN DETEC AMPLI PROBE: HCPCS | Performed by: PHYSICIAN ASSISTANT

## 2021-03-13 PROCEDURE — 99213 OFFICE O/P EST LOW 20 MIN: CPT | Performed by: PHYSICIAN ASSISTANT

## 2021-03-13 RX ORDER — AZITHROMYCIN 250 MG/1
TABLET, FILM COATED ORAL
Qty: 6 TABLET | Refills: 0 | Status: SHIPPED | OUTPATIENT
Start: 2021-03-13 | End: 2021-03-17

## 2021-03-13 NOTE — PATIENT INSTRUCTIONS
Monitor your symptoms, if symptoms worsen report to the ED  Increase oral hydration  Get plenty of rest   Take Motrin and Tylenol to reduce any fever/pain  [address]    Your healthcare provider and/or public health staff have evaluated you and have determined that you do not need to remain in the hospital at this time  At this time you can be isolated at home where you will be monitored by staff from your local or state health department  You should carefully follow the prevention and isolation steps below until a healthcare provider or local or state health department says that you can return to your normal activities  Stay home except to get medical care    People who are mildly ill with COVID-19 are able to isolate at home during their illness  You should restrict activities outside your home, except for getting medical care  Do not go to work, school, or public areas  Avoid using public transportation, ride-sharing, or taxis  Separate yourself from other people and animals in your home    People: As much as possible, you should stay in a specific room and away from other people in your home  Also, you should use a separate bathroom, if available  Animals: You should restrict contact with pets and other animals while you are sick with COVID-19, just like you would around other people  Although there have not been reports of pets or other animals becoming sick with COVID-19, it is still recommended that people sick with COVID-19 limit contact with animals until more information is known about the virus  When possible, have another member of your household care for your animals while you are sick  If you are sick with COVID-19, avoid contact with your pet, including petting, snuggling, being kissed or licked, and sharing food  If you must care for your pet or be around animals while you are sick, wash your hands before and after you interact with pets and wear a facemask   See COVID-19 and Animals for more information  Call ahead before visiting your doctor    If you have a medical appointment, call the healthcare provider and tell them that you have or may have COVID-19  This will help the healthcare providers office take steps to keep other people from getting infected or exposed  Wear a facemask    You should wear a facemask when you are around other people (e g , sharing a room or vehicle) or pets and before you enter a healthcare providers office  If you are not able to wear a facemask (for example, because it causes trouble breathing), then people who live with you should not stay in the same room with you, or they should wear a facemask if they enter your room  Cover your coughs and sneezes    Cover your mouth and nose with a tissue when you cough or sneeze  Throw used tissues in a lined trash can  Immediately wash your hands with soap and water for at least 20 seconds or, if soap and water are not available, clean your hands with an alcohol-based hand  that contains at least 60% alcohol  Clean your hands often    Wash your hands often with soap and water for at least 20 seconds, especially after blowing your nose, coughing, or sneezing; going to the bathroom; and before eating or preparing food  If soap and water are not readily available, use an alcohol-based hand  with at least 60% alcohol, covering all surfaces of your hands and rubbing them together until they feel dry  Soap and water are the best option if hands are visibly dirty  Avoid touching your eyes, nose, and mouth with unwashed hands  Avoid sharing personal household items    You should not share dishes, drinking glasses, cups, eating utensils, towels, or bedding with other people or pets in your home  After using these items, they should be washed thoroughly with soap and water      Clean all high-touch surfaces everyday    High touch surfaces include counters, tabletops, doorknobs, bathroom fixtures, toilets, phones, keyboards, tablets, and bedside tables  Also, clean any surfaces that may have blood, stool, or body fluids on them  Use a household cleaning spray or wipe, according to the label instructions  Labels contain instructions for safe and effective use of the cleaning product including precautions you should take when applying the product, such as wearing gloves and making sure you have good ventilation during use of the product  Monitor your symptoms    Seek prompt medical attention if your illness is worsening (e g , difficulty breathing)  Before seeking care, call your healthcare provider and tell them that you have, or are being evaluated for, COVID-19  Put on a facemask before you enter the facility  These steps will help the healthcare providers office to keep other people in the office or waiting room from getting infected or exposed  Ask your healthcare provider to call the local or [facility] health department  Persons who are placed under active monitoring or facilitated self-monitoring should follow instructions provided by their local health department or occupational health professionals, as appropriate  If you have a medical emergency and need to call 911, notify the dispatch personnel that you have, or are being evaluated for COVID-19  If possible, put on a facemask before emergency medical services arrive      Discontinuing home isolation    Patients with confirmed COVID-19 should remain under home isolation precautions until the following conditions are met:   - They have had no fever for at least 24 hours (that is one full day of no fever without the use medicine that reduces fevers)  AND  - other symptoms have improved (for example, when their cough or shortness of breath have improved)  AND  - If had mild or moderate illness, at least 10 days have passed since their symptoms first appeared or if severe illness (needed oxygen) or immunosuppressed, at least 20 days have passed since symptoms first appeared  Patients with confirmed COVID-19 should also notify close contacts (including their workplace) and ask that they self-quarantine  Currently, close contact is defined as being within 6 feet for 15 minutes or more from the period 24 hours starting 48 hours before symptom onset to the time at which the patient went into isolation  Close contacts of patients diagnosed with COVID-19 should be instructed by the patient to self-quarantine for 14 days from the last time of their last contact with the patient       Source: [employer]

## 2021-03-13 NOTE — PROGRESS NOTES
[facility]        NAME: Esdras Hardwick is a 38 y o  male  : 1980    MRN: [id number]  DATE: 2021  TIME: 2:55 PM    Assessment and Plan   Acute URI [J06 9]  1  Acute URI  azithromycin (ZITHROMAX) 250 mg tablet    Novel Coronavirus (Covid-19),PCR [facility]   Covid-19: Pending        Patient Instructions       Follow up with PCP in 3-5 days  Proceed to  ER if symptoms worsen  Chief Complaint     Chief Complaint   Patient presents with    Shortness of Breath     Pt reports no improvement since visit on monday  History of Present Illness       Patient is a 39 y/o/m [facility] with persistent cough, congestion, SOB/wheezing  Pt was evaluated in clinic 5 days ago and rx Prednisone and inhaler  Pt reports finishing steroid 2 days ago and noticed last evening symptoms were worsening  Pt reports chills/night sweats w/ congestion, SOB and cough  Hx of allergies  URI   This is a new problem  The current episode started 1 to 4 weeks ago  The problem has been gradually worsening  There has been no fever  Associated symptoms include congestion and coughing  Pertinent negatives include no abdominal pain, chest pain, dysuria, ear pain, rash, sore throat or vomiting  He has tried inhaler use for the symptoms  Review of Systems   Review of Systems   Constitutional: Positive for chills and diaphoresis  Negative for fever  HENT: Positive for congestion  Negative for ear pain and sore throat  Eyes: Negative for pain and visual disturbance  Respiratory: Positive for cough  Negative for shortness of breath  Cardiovascular: Negative for chest pain and palpitations  Gastrointestinal: Negative for abdominal pain and vomiting  Genitourinary: Negative for dysuria and hematuria  Musculoskeletal: Negative for arthralgias and back pain  Skin: Negative for color change and rash  Neurological: Negative for seizures and syncope     All other systems reviewed and are negative          Current Medications       Current Outpatient Medications:     albuterol (PROVENTIL HFA,VENTOLIN HFA) 90 mcg/act inhaler, Inhale 2 puffs every 6 (six) hours as needed for wheezing, Disp: , Rfl:     albuterol (PROVENTIL HFA,VENTOLIN HFA) 90 mcg/act inhaler, Inhale 2 puffs every 6 (six) hours as needed for wheezing, Disp: 1 Inhaler, Rfl: 0    ALPRAZolam (XANAX) 0 25 mg tablet, Take 1 tablet (0 25 mg total) by mouth 2 (two) times a day as needed for anxiety, Disp: 60 tablet, Rfl: 3    azithromycin (ZITHROMAX) 250 mg tablet, Take 2 tablets today then 1 tablet daily x 4 days, Disp: 6 tablet, Rfl: 0    cetirizine (ZyrTEC) 10 mg tablet, Take 1 tablet (10 mg total) by mouth daily, Disp: 30 tablet, Rfl: 5    citalopram (CeleXA) 20 mg tablet, Take 1/2 tab  daily for 1 week, them increase to 1 tab  daily (Patient not taking: Reported on 3/8/2021), Disp: 30 tablet, Rfl: 5    fluticasone (FLONASE) 50 mcg/act nasal spray, 2 sprays into each nostril daily (Patient not taking: Reported on 6/29/2020), Disp: 1 Bottle, Rfl: 1    multivitamin (THERAGRAN) TABS, Take 1 tablet by mouth daily, Disp: , Rfl:     predniSONE 50 mg tablet, Take 1 tablet (50 mg total) by mouth daily for 5 days, Disp: 5 tablet, Rfl: 0    Current Allergies     Allergies as of 03/13/2021 - Reviewed 03/13/2021   Allergen Reaction Noted    Chocolate Sneezing 11/28/2017            The following portions of the patient's history were reviewed and updated as appropriate: allergies, current medications, past family history, past medical history, past social history, past surgical history and problem list      Past Medical History:   Diagnosis Date    Allergic     Anxiety     Asthma     Obesity        Past Surgical History:   Procedure Laterality Date    MYRINGOTOMY W/ TUBES         Family History   Problem Relation Age of Onset    Diabetes Mother     Hypertension Mother     Alcohol abuse Father          Medications have been verified  Objective   Pulse 90   Temp 97 9 °F (36 6 °C)   Resp 18   Ht 6' 3" (1 905 m)   Wt 122 kg (270 lb)   SpO2 98%   BMI 33 75 kg/m²   No LMP for male patient  Physical Exam     Physical Exam  Constitutional:       Appearance: Normal appearance  He is normal weight  HENT:      Head: Normocephalic and atraumatic  Nose: Nose normal       Mouth/Throat:      Mouth: Mucous membranes are dry  Eyes:      Extraocular Movements: Extraocular movements intact  Conjunctiva/sclera: Conjunctivae normal       Pupils: Pupils are equal, round, and reactive to light  Neck:      Musculoskeletal: Normal range of motion and neck supple  Cardiovascular:      Rate and Rhythm: Normal rate and regular rhythm  Pulmonary:      Effort: Pulmonary effort is normal    Musculoskeletal: Normal range of motion  Skin:     General: Skin is warm and dry  Neurological:      General: No focal deficit present  Mental Status: He is alert and oriented to person, place, and time     Psychiatric:         Mood and Affect: Mood normal          Behavior: Behavior normal

## 2021-03-15 LAB — SARS-COV-2 RNA RESP QL NAA+PROBE: NEGATIVE

## 2021-03-24 DIAGNOSIS — J45.21 MILD INTERMITTENT ASTHMA WITH ACUTE EXACERBATION: ICD-10-CM

## 2021-03-24 RX ORDER — ALBUTEROL SULFATE 90 UG/1
AEROSOL, METERED RESPIRATORY (INHALATION)
Qty: 6.7 INHALER | OUTPATIENT
Start: 2021-03-24

## 2021-05-15 ENCOUNTER — OFFICE VISIT (OUTPATIENT)
Dept: URGENT CARE | Age: 41
End: 2021-05-15
Payer: COMMERCIAL

## 2021-05-15 VITALS
RESPIRATION RATE: 18 BRPM | HEART RATE: 77 BPM | OXYGEN SATURATION: 97 % | BODY MASS INDEX: 36.4 KG/M2 | HEIGHT: 71 IN | TEMPERATURE: 98.1 F | WEIGHT: 260 LBS

## 2021-05-15 DIAGNOSIS — J32.9 SINUSITIS, UNSPECIFIED CHRONICITY, UNSPECIFIED LOCATION: Primary | ICD-10-CM

## 2021-05-15 DIAGNOSIS — Z20.822 ENCOUNTER FOR LABORATORY TESTING FOR COVID-19 VIRUS: ICD-10-CM

## 2021-05-15 PROCEDURE — 99213 OFFICE O/P EST LOW 20 MIN: CPT | Performed by: PHYSICIAN ASSISTANT

## 2021-05-15 PROCEDURE — 87635 SARS-COV-2 COVID-19 AMP PRB: CPT | Performed by: PHYSICIAN ASSISTANT

## 2021-05-15 RX ORDER — AMOXICILLIN AND CLAVULANATE POTASSIUM 875; 125 MG/1; MG/1
1 TABLET, FILM COATED ORAL EVERY 12 HOURS SCHEDULED
Qty: 20 TABLET | Refills: 0 | Status: SHIPPED | OUTPATIENT
Start: 2021-05-15 | End: 2021-05-25

## 2021-05-15 RX ORDER — FLUTICASONE PROPIONATE 50 MCG
1 SPRAY, SUSPENSION (ML) NASAL DAILY
Qty: 9.9 ML | Refills: 0 | Status: SHIPPED | OUTPATIENT
Start: 2021-05-15 | End: 2021-08-14 | Stop reason: ALTCHOICE

## 2021-05-15 RX ORDER — ALBUTEROL SULFATE 90 UG/1
2 AEROSOL, METERED RESPIRATORY (INHALATION) EVERY 6 HOURS PRN
Qty: 8.5 G | Refills: 0 | Status: SHIPPED | OUTPATIENT
Start: 2021-05-15 | End: 2021-08-14 | Stop reason: ALTCHOICE

## 2021-05-15 NOTE — PATIENT INSTRUCTIONS
[address]    Your healthcare provider and/or public health staff have evaluated you and have determined that you do not need to remain in the hospital at this time  At this time you can be isolated at home where you will be monitored by staff from your local or state health department  You should carefully follow the prevention and isolation steps below until a healthcare provider or local or state health department says that you can return to your normal activities  Stay home except to get medical care    People who are mildly ill with COVID-19 are able to isolate at home during their illness  You should restrict activities outside your home, except for getting medical care  Do not go to work, school, or public areas  Avoid using public transportation, ride-sharing, or taxis  Separate yourself from other people and animals in your home    People: As much as possible, you should stay in a specific room and away from other people in your home  Also, you should use a separate bathroom, if available  Animals: You should restrict contact with pets and other animals while you are sick with COVID-19, just like you would around other people  Although there have not been reports of pets or other animals becoming sick with COVID-19, it is still recommended that people sick with COVID-19 limit contact with animals until more information is known about the virus  When possible, have another member of your household care for your animals while you are sick  If you are sick with COVID-19, avoid contact with your pet, including petting, snuggling, being kissed or licked, and sharing food  If you must care for your pet or be around animals while you are sick, wash your hands before and after you interact with pets and wear a facemask  See COVID-19 and Animals for more information      Call ahead before visiting your doctor    If you have a medical appointment, call the healthcare provider and tell them that you have or may have COVID-19  This will help the healthcare providers office take steps to keep other people from getting infected or exposed  Wear a facemask    You should wear a facemask when you are around other people (e g , sharing a room or vehicle) or pets and before you enter a healthcare providers office  If you are not able to wear a facemask (for example, because it causes trouble breathing), then people who live with you should not stay in the same room with you, or they should wear a facemask if they enter your room  Cover your coughs and sneezes    Cover your mouth and nose with a tissue when you cough or sneeze  Throw used tissues in a lined trash can  Immediately wash your hands with soap and water for at least 20 seconds or, if soap and water are not available, clean your hands with an alcohol-based hand  that contains at least 60% alcohol  Clean your hands often    Wash your hands often with soap and water for at least 20 seconds, especially after blowing your nose, coughing, or sneezing; going to the bathroom; and before eating or preparing food  If soap and water are not readily available, use an alcohol-based hand  with at least 60% alcohol, covering all surfaces of your hands and rubbing them together until they feel dry  Soap and water are the best option if hands are visibly dirty  Avoid touching your eyes, nose, and mouth with unwashed hands  Avoid sharing personal household items    You should not share dishes, drinking glasses, cups, eating utensils, towels, or bedding with other people or pets in your home  After using these items, they should be washed thoroughly with soap and water  Clean all high-touch surfaces everyday    High touch surfaces include counters, tabletops, doorknobs, bathroom fixtures, toilets, phones, keyboards, tablets, and bedside tables  Also, clean any surfaces that may have blood, stool, or body fluids on them   Use a household cleaning spray or wipe, according to the label instructions  Labels contain instructions for safe and effective use of the cleaning product including precautions you should take when applying the product, such as wearing gloves and making sure you have good ventilation during use of the product  Monitor your symptoms    Seek prompt medical attention if your illness is worsening (e g , difficulty breathing)  Before seeking care, call your healthcare provider and tell them that you have, or are being evaluated for, COVID-19  Put on a facemask before you enter the facility  These steps will help the healthcare providers office to keep other people in the office or waiting room from getting infected or exposed  Ask your healthcare provider to call the local or [facility] health department  Persons who are placed under active monitoring or facilitated self-monitoring should follow instructions provided by their local health department or occupational health professionals, as appropriate  If you have a medical emergency and need to call 911, notify the dispatch personnel that you have, or are being evaluated for COVID-19  If possible, put on a facemask before emergency medical services arrive      Discontinuing home isolation    Patients with confirmed COVID-19 should remain under home isolation precautions until the following conditions are met:   - They have had no fever for at least 24 hours (that is one full day of no fever without the use medicine that reduces fevers)  AND  - other symptoms have improved (for example, when their cough or shortness of breath have improved)  AND  - If had mild or moderate illness, at least 10 days have passed since their symptoms first appeared or if severe illness (needed oxygen) or immunosuppressed, at least 20 days have passed since symptoms first appeared  Patients with confirmed COVID-19 should also notify close contacts (including their workplace) and ask that they self-quarantine  Currently, close contact is defined as being within 6 feet for 15 minutes or more from the period 24 hours starting 48 hours before symptom onset to the time at which the patient went into isolation  Close contacts of patients diagnosed with COVID-19 should be instructed by the patient to self-quarantine for 14 days from the last time of their last contact with the patient  Source: [employer]  Sinusitis   WHAT YOU NEED TO KNOW:   Sinusitis is inflammation or infection of your sinuses  It is most often caused by a virus  Acute sinusitis may last up to 12 weeks  Chronic sinusitis lasts longer than 12 weeks  Recurrent sinusitis means you have 4 or more times in 1 year  DISCHARGE INSTRUCTIONS:   Return to the emergency department if:   · Your eye and eyelid are red, swollen, and painful  · You cannot open your eye  · You have vision changes, such as double vision  · Your eyeball bulges out or you cannot move your eye  · You are more sleepy than normal, or you notice changes in your ability to think, move, or talk  · You have a stiff neck, a fever, or a bad headache  · You have swelling of your forehead or scalp  Contact your healthcare provider if:   · Your symptoms do not improve after 3 days  · Your symptoms do not go away after 10 days  · You have nausea and are vomiting  · Your nose is bleeding  · You have questions or concerns about your condition or care  Medicines: Your symptoms may go away on their own  Your healthcare provider may recommend watchful waiting for up to 10 days before starting antibiotics  You may  need any of the following:  · Acetaminophen  decreases pain and fever  It is available without a doctor's order  Ask how much to take and how often to take it  Follow directions   Read the labels of all other medicines you are using to see if they also contain acetaminophen, or ask your doctor or pharmacist  Acetaminophen can cause liver damage if not taken correctly  Do not use more than 4 grams (4,000 milligrams) total of acetaminophen in one day  · NSAIDs , such as ibuprofen, help decrease swelling, pain, and fever  This medicine is available with or without a doctor's order  NSAIDs can cause stomach bleeding or kidney problems in certain people  If you take blood thinner medicine, always ask your healthcare provider if NSAIDs are safe for you  Always read the medicine label and follow directions  · Nasal steroid sprays  may help decrease inflammation in your nose and sinuses  · Decongestants  help reduce swelling and drain mucus in the nose and sinuses  They may help you breathe easier  · Antihistamines  help dry mucus in the nose and relieve sneezing  · Antibiotics  help treat or prevent a bacterial infection  · Take your medicine as directed  Contact your healthcare provider if you think your medicine is not helping or if you have side effects  Tell him or her if you are allergic to any medicine  Keep a list of the medicines, vitamins, and herbs you take  Include the amounts, and when and why you take them  Bring the list or the pill bottles to follow-up visits  Carry your medicine list with you in case of an emergency  Self-care:   · Rinse your sinuses  Use a sinus rinse device to rinse your nasal passages with a saline (salt water) solution or distilled water  Do not use tap water  This will help thin the mucus in your nose and rinse away pollen and dirt  It will also help reduce swelling so you can breathe normally  Ask your healthcare provider how often to do this  · Breathe in steam   Heat a bowl of water until you see steam  Lean over the bowl and make a tent over your head with a large towel  Breathe deeply for about 20 minutes  Be careful not to get too close to the steam or burn yourself  Do this 3 times a day   You can also breathe deeply when you take a hot shower  · Sleep with your head elevated  Place an extra pillow under your head before you go to sleep to help your sinuses drain  · Drink liquids as directed  Ask your healthcare provider how much liquid to drink each day and which liquids are best for you  Liquids will thin the mucus in your nose and help it drain  Avoid drinks that contain alcohol or caffeine  · Do not smoke, and avoid secondhand smoke  Nicotine and other chemicals in cigarettes and cigars can make your symptoms worse  Ask your healthcare provider for information if you currently smoke and need help to quit  E-cigarettes or smokeless tobacco still contain nicotine  Talk to your healthcare provider before you use these products  Prevent the spread of germs that cause sinusitis:  Wash your hands often with soap and water  Wash your hands after you use the bathroom, change a child's diaper, or sneeze  Wash your hands before you prepare or eat food  Follow up with your healthcare provider as directed: You may be referred to an ear, nose, and throat specialist  Write down your questions so you remember to ask them during your visits  © Copyright [address] Information is for End User's use only and may not be sold, redistributed or otherwise used for commercial purposes  All illustrations and images included in CareNotes® are the copyrighted property of A D A M [employer]  or [address] [place] [address]  The above information is an  only  It is not intended as medical advice for individual conditions or treatments  Talk to your doctor, nurse or pharmacist before following any medical regimen to see if it is safe and effective for you

## 2021-05-15 NOTE — PROGRESS NOTES
[facility]        NAME: Chris Marsh is a 42 y o  male  : 1980    MRN: [id number]  DATE: May 15, 2021  TIME: 1:26 PM    Pulse 77   Temp 98 1 °F (36 7 °C) (Temporal)   Resp 18   Ht 5' 11" (1 803 m)   Wt 118 kg (260 lb)   SpO2 97%   BMI 36 26 kg/m²     Assessment and Plan   Sinusitis, unspecified chronicity, unspecified location [J32 9]  1  Sinusitis, unspecified chronicity, unspecified location  Novel Coronavirus [facility]Covid-19),[facility]    amoxicillin-clavulanate (AUGMENTIN) 875-125 mg per tablet    albuterol (ProAir HFA) 90 mcg/act inhaler    fluticasone (FLONASE) 50 mcg/act nasal spray   2  Encounter for laboratory testing for COVID-19 virus  amoxicillin-clavulanate (AUGMENTIN) 875-125 mg per tablet    albuterol (ProAir HFA) 90 mcg/act inhaler    fluticasone (FLONASE) 50 mcg/act nasal spray         Patient Instructions       Follow up with PCP in 3-5 days  Proceed to  ER if symptoms worsen  Chief Complaint     Chief Complaint   Patient presents with    Headache     Nasal congestion, headache, nausea, sinus pressure x 1 week         History of Present Illness       Pt with nasal congestion frontal headache for about 4-5 days       Review of Systems   Review of Systems   Constitutional: Negative  HENT: Positive for congestion, sinus pressure and sinus pain  Eyes: Negative  Respiratory: Negative  Cardiovascular: Negative  Gastrointestinal: Negative  Endocrine: Negative  Genitourinary: Negative  Musculoskeletal: Negative  Skin: Negative  Allergic/Immunologic: Negative  Neurological: Positive for headaches  Hematological: Negative  Psychiatric/Behavioral: Negative  All other systems reviewed and are negative          Current Medications       Current Outpatient Medications:     albuterol (PROVENTIL HFA,VENTOLIN HFA) 90 mcg/act inhaler, Inhale 2 puffs every 6 (six) hours as needed for wheezing, Disp: , Rfl:     albuterol (PROVENTIL HFA,VENTOLIN HFA) 90 mcg/act inhaler, Inhale 2 puffs every 6 (six) hours as needed for wheezing, Disp: 1 Inhaler, Rfl: 0    ALPRAZolam (XANAX) 0 25 mg tablet, Take 1 tablet (0 25 mg total) by mouth 2 (two) times a day as needed for anxiety, Disp: 60 tablet, Rfl: 3    cetirizine (ZyrTEC) 10 mg tablet, Take 1 tablet (10 mg total) by mouth daily, Disp: 30 tablet, Rfl: 5    multivitamin (THERAGRAN) TABS, Take 1 tablet by mouth daily, Disp: , Rfl:     albuterol (ProAir HFA) 90 mcg/act inhaler, Inhale 2 puffs every 6 (six) hours as needed for wheezing, Disp: 8 5 g, Rfl: 0    amoxicillin-clavulanate (AUGMENTIN) 875-125 mg per tablet, Take 1 tablet by mouth every 12 (twelve) hours for 10 days, Disp: 20 tablet, Rfl: 0    citalopram (CeleXA) 20 mg tablet, Take 1/2 tab  daily for 1 week, them increase to 1 tab  daily (Patient not taking: Reported on 5/15/2021), Disp: 30 tablet, Rfl: 5    fluticasone (FLONASE) 50 mcg/act nasal spray, 2 sprays into each nostril daily (Patient not taking: Reported on 6/29/2020), Disp: 1 Bottle, Rfl: 1    fluticasone (FLONASE) 50 mcg/act nasal spray, 1 spray into each nostril daily, Disp: 9 9 mL, Rfl: 0    Current Allergies     Allergies as of 05/15/2021 - Reviewed 05/15/2021   Allergen Reaction Noted    Chocolate - food allergy Sneezing 11/28/2017            The following portions of the patient's history were reviewed and updated as appropriate: allergies, current medications, past family history, past medical history, past social history, past surgical history and problem list      Past Medical History:   Diagnosis Date    Allergic     Anxiety     Asthma     Obesity        Past Surgical History:   Procedure Laterality Date    MYRINGOTOMY W/ TUBES         Family History   Problem Relation Age of Onset    Diabetes Mother     Hypertension Mother     Alcohol abuse Father          Medications have been verified          Objective   Pulse 77   Temp 98 1 °F (36 7 °C) (Temporal) Resp 18   Ht 5' 11" (1 803 m)   Wt 118 kg (260 lb)   SpO2 97%   BMI 36 26 kg/m²        Physical Exam     Physical Exam  Vitals signs and nursing note reviewed  Constitutional:       Appearance: Normal appearance  He is normal weight  Comments: Pt requesting albuterol inhaler refill    HENT:      Head: Normocephalic and atraumatic  Right Ear: Tympanic membrane, ear canal and external ear normal       Left Ear: Ear canal and external ear normal       Nose: Congestion and rhinorrhea present  Comments: Max sinus tender to palp   Boggy mucosa       Mouth/Throat:      Mouth: Mucous membranes are moist    Eyes:      Extraocular Movements: Extraocular movements intact  Conjunctiva/sclera: Conjunctivae normal       Pupils: Pupils are equal, round, and reactive to light  Neck:      Musculoskeletal: Normal range of motion and neck supple  Cardiovascular:      Rate and Rhythm: Normal rate and regular rhythm  Pulses: Normal pulses  Heart sounds: Normal heart sounds  Pulmonary:      Effort: Pulmonary effort is normal       Breath sounds: Normal breath sounds  Abdominal:      General: Abdomen is flat  Bowel sounds are normal       Palpations: Abdomen is soft  Musculoskeletal: Normal range of motion  Skin:     General: Skin is warm  Capillary Refill: Capillary refill takes less than 2 seconds  Neurological:      General: No focal deficit present  Mental Status: He is alert and oriented to person, place, and time     Psychiatric:         Mood and Affect: Mood normal          Behavior: Behavior normal

## 2021-05-18 LAB — SARS-COV-2 RNA RESP QL NAA+PROBE: NEGATIVE

## 2021-07-15 DIAGNOSIS — F41.9 ANXIETY: ICD-10-CM

## 2021-07-15 RX ORDER — ALPRAZOLAM 0.25 MG/1
0.25 TABLET ORAL 2 TIMES DAILY PRN
Qty: 60 TABLET | Refills: 3 | Status: SHIPPED | OUTPATIENT
Start: 2021-07-15 | End: 2021-08-14 | Stop reason: SDUPTHER

## 2021-07-16 ENCOUNTER — OFFICE VISIT (OUTPATIENT)
Dept: URGENT CARE | Age: 41
End: 2021-07-16
Payer: COMMERCIAL

## 2021-07-16 VITALS
OXYGEN SATURATION: 98 % | HEART RATE: 107 BPM | TEMPERATURE: 98 F | HEIGHT: 75 IN | BODY MASS INDEX: 32.95 KG/M2 | WEIGHT: 265 LBS | RESPIRATION RATE: 18 BRPM

## 2021-07-16 DIAGNOSIS — B34.9 ACUTE VIRAL SYNDROME: Primary | ICD-10-CM

## 2021-07-16 DIAGNOSIS — Z11.59 SPECIAL SCREENING EXAMINATION FOR UNSPECIFIED VIRAL DISEASE: ICD-10-CM

## 2021-07-16 PROCEDURE — 99213 OFFICE O/P EST LOW 20 MIN: CPT | Performed by: PHYSICIAN ASSISTANT

## 2021-07-16 PROCEDURE — 87635 SARS-COV-2 COVID-19 AMP PRB: CPT | Performed by: PHYSICIAN ASSISTANT

## 2021-07-16 RX ORDER — METHYLPREDNISOLONE 4 MG/1
TABLET ORAL
Qty: 1 EACH | Refills: 0 | Status: SHIPPED | OUTPATIENT
Start: 2021-07-16 | End: 2021-08-14 | Stop reason: ALTCHOICE

## 2021-07-16 RX ORDER — ALBUTEROL SULFATE 90 UG/1
2 AEROSOL, METERED RESPIRATORY (INHALATION) EVERY 6 HOURS PRN
Qty: 8.5 G | Refills: 0 | Status: SHIPPED | OUTPATIENT
Start: 2021-07-16

## 2021-07-16 RX ORDER — ONDANSETRON 4 MG/1
4 TABLET, ORALLY DISINTEGRATING ORAL EVERY 6 HOURS PRN
Qty: 20 TABLET | Refills: 0 | Status: SHIPPED | OUTPATIENT
Start: 2021-07-16 | End: 2021-08-14 | Stop reason: ALTCHOICE

## 2021-07-16 NOTE — PATIENT INSTRUCTIONS
Monitor your symptoms, if symptoms worsen report to the ED  Increase oral hydration  Get plenty of rest   Take Motrin and Tylenol to reduce any fever/pain  [address]    Your healthcare provider and/or public health staff have evaluated you and have determined that you do not need to remain in the hospital at this time  At this time you can be isolated at home where you will be monitored by staff from your local or state health department  You should carefully follow the prevention and isolation steps below until a healthcare provider or local or state health department says that you can return to your normal activities  Stay home except to get medical care    People who are mildly ill with COVID-19 are able to isolate at home during their illness  You should restrict activities outside your home, except for getting medical care  Do not go to work, school, or public areas  Avoid using public transportation, ride-sharing, or taxis  Separate yourself from other people and animals in your home    People: As much as possible, you should stay in a specific room and away from other people in your home  Also, you should use a separate bathroom, if available  Animals: You should restrict contact with pets and other animals while you are sick with COVID-19, just like you would around other people  Although there have not been reports of pets or other animals becoming sick with COVID-19, it is still recommended that people sick with COVID-19 limit contact with animals until more information is known about the virus  When possible, have another member of your household care for your animals while you are sick  If you are sick with COVID-19, avoid contact with your pet, including petting, snuggling, being kissed or licked, and sharing food  If you must care for your pet or be around animals while you are sick, wash your hands before and after you interact with pets and wear a facemask   See COVID-19 and Animals for more information  Call ahead before visiting your doctor    If you have a medical appointment, call the healthcare provider and tell them that you have or may have COVID-19  This will help the healthcare providers office take steps to keep other people from getting infected or exposed  Wear a facemask    You should wear a facemask when you are around other people (e g , sharing a room or vehicle) or pets and before you enter a healthcare providers office  If you are not able to wear a facemask (for example, because it causes trouble breathing), then people who live with you should not stay in the same room with you, or they should wear a facemask if they enter your room  Cover your coughs and sneezes    Cover your mouth and nose with a tissue when you cough or sneeze  Throw used tissues in a lined trash can  Immediately wash your hands with soap and water for at least 20 seconds or, if soap and water are not available, clean your hands with an alcohol-based hand  that contains at least 60% alcohol  Clean your hands often    Wash your hands often with soap and water for at least 20 seconds, especially after blowing your nose, coughing, or sneezing; going to the bathroom; and before eating or preparing food  If soap and water are not readily available, use an alcohol-based hand  with at least 60% alcohol, covering all surfaces of your hands and rubbing them together until they feel dry  Soap and water are the best option if hands are visibly dirty  Avoid touching your eyes, nose, and mouth with unwashed hands  Avoid sharing personal household items    You should not share dishes, drinking glasses, cups, eating utensils, towels, or bedding with other people or pets in your home  After using these items, they should be washed thoroughly with soap and water      Clean all high-touch surfaces everyday    High touch surfaces include counters, tabletops, doorknobs, bathroom fixtures, toilets, phones, keyboards, tablets, and bedside tables  Also, clean any surfaces that may have blood, stool, or body fluids on them  Use a household cleaning spray or wipe, according to the label instructions  Labels contain instructions for safe and effective use of the cleaning product including precautions you should take when applying the product, such as wearing gloves and making sure you have good ventilation during use of the product  Monitor your symptoms    Seek prompt medical attention if your illness is worsening (e g , difficulty breathing)  Before seeking care, call your healthcare provider and tell them that you have, or are being evaluated for, COVID-19  Put on a facemask before you enter the facility  These steps will help the healthcare providers office to keep other people in the office or waiting room from getting infected or exposed  Ask your healthcare provider to call the local or [facility] health department  Persons who are placed under active monitoring or facilitated self-monitoring should follow instructions provided by their local health department or occupational health professionals, as appropriate  If you have a medical emergency and need to call 911, notify the dispatch personnel that you have, or are being evaluated for COVID-19  If possible, put on a facemask before emergency medical services arrive      Discontinuing home isolation    Patients with confirmed COVID-19 should remain under home isolation precautions until the following conditions are met:   - They have had no fever for at least 24 hours (that is one full day of no fever without the use medicine that reduces fevers)  AND  - other symptoms have improved (for example, when their cough or shortness of breath have improved)  AND  - If had mild or moderate illness, at least 10 days have passed since their symptoms first appeared or if severe illness (needed oxygen) or immunosuppressed, at least 20 days have passed since symptoms first appeared  Patients with confirmed COVID-19 should also notify close contacts (including their workplace) and ask that they self-quarantine  Currently, close contact is defined as being within 6 feet for 15 minutes or more from the period 24 hours starting 48 hours before symptom onset to the time at which the patient went into isolation  Close contacts of patients diagnosed with COVID-19 should be instructed by the patient to self-quarantine for 14 days from the last time of their last contact with the patient       Source: [employer]

## 2021-07-16 NOTE — PROGRESS NOTES
[facility]        NAME: Harika Price is a 42 y o  male  : 1980    MRN: [id number]  DATE: 2021  TIME: 1:20 PM    Assessment and Plan   Acute viral syndrome [B34 9]  1  Acute viral syndrome  ondansetron (ZOFRAN-ODT) 4 mg disintegrating tablet    methylPREDNISolone 4 MG tablet therapy pack    albuterol (ProAir HFA) 90 mcg/act inhaler   2  Special screening examination for unspecified viral disease  Novel Coronavirus (Covid-19),PCR [facility] - Office Collection         Patient Instructions       Follow up with PCP in 3-5 days  Proceed to  ER if symptoms worsen  Chief Complaint     Chief Complaint   Patient presents with    COVID-19     pt reports nausea, SOB, fatigue, sore throat that started a few days ago  Pt has not had Covid vaccine  History of Present Illness       Patient is c/o nausea, fatigue, sore throat, cough/shortness of breath  Pt reports symptoms began a few days ago  Nausea this morning  Patient reports throat feeling tight  Pt denies any known fever or chest pain  Pt almost out of albuterol rescue Inhaler  Pt in NAD  URI   This is a new problem  The current episode started in the past 7 days  The problem has been gradually worsening  There has been no fever  Associated symptoms include coughing, nausea, a sore throat and swollen glands  Pertinent negatives include no abdominal pain, chest pain, dysuria, ear pain, rash or vomiting  Review of Systems   Review of Systems   Constitutional: Negative for chills and fever  HENT: Positive for sore throat  Negative for ear pain  Eyes: Negative for pain and visual disturbance  Respiratory: Positive for cough  Negative for shortness of breath  Cardiovascular: Negative for chest pain and palpitations  Gastrointestinal: Positive for nausea  Negative for abdominal pain and vomiting  Genitourinary: Negative for dysuria and hematuria  Musculoskeletal: Negative for arthralgias and back pain     Skin: Negative for color change and rash  Neurological: Negative for seizures and syncope  All other systems reviewed and are negative          Current Medications       Current Outpatient Medications:     albuterol (ProAir HFA) 90 mcg/act inhaler, Inhale 2 puffs every 6 (six) hours as needed for wheezing, Disp: 8 5 g, Rfl: 0    albuterol (ProAir HFA) 90 mcg/act inhaler, Inhale 2 puffs every 6 (six) hours as needed for shortness of breath, Disp: 8 5 g, Rfl: 0    albuterol (PROVENTIL HFA,VENTOLIN HFA) 90 mcg/act inhaler, Inhale 2 puffs every 6 (six) hours as needed for wheezing (Patient not taking: Reported on 7/16/2021), Disp: , Rfl:     albuterol (PROVENTIL HFA,VENTOLIN HFA) 90 mcg/act inhaler, Inhale 2 puffs every 6 (six) hours as needed for wheezing (Patient not taking: Reported on 7/16/2021), Disp: 1 Inhaler, Rfl: 0    ALPRAZolam (XANAX) 0 25 mg tablet, Take 1 tablet (0 25 mg total) by mouth 2 (two) times a day as needed for anxiety (Patient not taking: Reported on 7/16/2021), Disp: 60 tablet, Rfl: 3    cetirizine (ZyrTEC) 10 mg tablet, Take 1 tablet (10 mg total) by mouth daily (Patient not taking: Reported on 7/16/2021), Disp: 30 tablet, Rfl: 5    citalopram (CeleXA) 20 mg tablet, Take 1/2 tab  daily for 1 week, them increase to 1 tab  daily (Patient not taking: Reported on 5/15/2021), Disp: 30 tablet, Rfl: 5    fluticasone (FLONASE) 50 mcg/act nasal spray, 2 sprays into each nostril daily (Patient not taking: Reported on 6/29/2020), Disp: 1 Bottle, Rfl: 1    fluticasone (FLONASE) 50 mcg/act nasal spray, 1 spray into each nostril daily, Disp: 9 9 mL, Rfl: 0    methylPREDNISolone 4 MG tablet therapy pack, Use as directed on package, Disp: 1 each, Rfl: 0    multivitamin (THERAGRAN) TABS, Take 1 tablet by mouth daily (Patient not taking: Reported on 7/16/2021), Disp: , Rfl:     ondansetron (ZOFRAN-ODT) 4 mg disintegrating tablet, Take 1 tablet (4 mg total) by mouth every 6 (six) hours as needed for nausea or vomiting, Disp: 20 tablet, Rfl: 0    Current Allergies     Allergies as of 07/16/2021 - Reviewed 07/16/2021   Allergen Reaction Noted    Chocolate - food allergy Sneezing 11/28/2017            The following portions of the patient's history were reviewed and updated as appropriate: allergies, current medications, past family history, past medical history, past social history, past surgical history and problem list      Past Medical History:   Diagnosis Date    Allergic     Anxiety     Asthma     Obesity        Past Surgical History:   Procedure Laterality Date    MYRINGOTOMY W/ TUBES         Family History   Problem Relation Age of Onset    Diabetes Mother     Hypertension Mother     Alcohol abuse Father          Medications have been verified  Objective   Pulse (!) 107   Temp 98 °F (36 7 °C)   Resp 18   Ht 6' 3" (1 905 m)   Wt 120 kg (265 lb)   SpO2 98%   BMI 33 12 kg/m²   No LMP for male patient  Physical Exam     Physical Exam  Constitutional:       Appearance: Normal appearance  He is normal weight  HENT:      Head: Normocephalic and atraumatic  Nose: Nose normal       Mouth/Throat:      Mouth: Mucous membranes are moist    Eyes:      Extraocular Movements: Extraocular movements intact  Conjunctiva/sclera: Conjunctivae normal       Pupils: Pupils are equal, round, and reactive to light  Cardiovascular:      Rate and Rhythm: Normal rate  Pulmonary:      Effort: Pulmonary effort is normal       Breath sounds: No wheezing, rhonchi or rales  Musculoskeletal:         General: Normal range of motion  Cervical back: Normal range of motion and neck supple  Lymphadenopathy:      Head:      Right side of head: Submandibular adenopathy present  Left side of head: Submandibular adenopathy present  Skin:     General: Skin is warm and dry  Neurological:      General: No focal deficit present        Mental Status: He is alert and oriented to person, place, and time     Psychiatric:         Mood and Affect: Mood normal          Behavior: Behavior normal

## 2021-07-16 NOTE — LETTER
July 16, 2021     Patient: Beverly Ludwig   YOB: 1980   Date of Visit: 7/16/2021       To Whom It May Concern: It is my medical opinion that Flora Farnsworth should not return to work until receipt of a negative Covid-19 test result  If you have any questions or concerns, please don't hesitate to call  Sincerely,        Kayleen Li PA-C    CC: Alfonso Pereira

## 2021-07-17 LAB — SARS-COV-2 RNA RESP QL NAA+PROBE: NEGATIVE

## 2021-07-20 ENCOUNTER — OFFICE VISIT (OUTPATIENT)
Dept: URGENT CARE | Age: 41
End: 2021-07-20
Payer: COMMERCIAL

## 2021-07-20 VITALS
TEMPERATURE: 97.2 F | OXYGEN SATURATION: 97 % | BODY MASS INDEX: 33.57 KG/M2 | HEART RATE: 92 BPM | HEIGHT: 75 IN | RESPIRATION RATE: 18 BRPM | WEIGHT: 270 LBS

## 2021-07-20 DIAGNOSIS — J02.9 SORE THROAT: Primary | ICD-10-CM

## 2021-07-20 LAB — S PYO AG THROAT QL: NEGATIVE

## 2021-07-20 PROCEDURE — 87147 CULTURE TYPE IMMUNOLOGIC: CPT | Performed by: PHYSICIAN ASSISTANT

## 2021-07-20 PROCEDURE — 99213 OFFICE O/P EST LOW 20 MIN: CPT | Performed by: PHYSICIAN ASSISTANT

## 2021-07-20 PROCEDURE — 87070 CULTURE OTHR SPECIMN AEROBIC: CPT | Performed by: PHYSICIAN ASSISTANT

## 2021-07-20 RX ORDER — AZITHROMYCIN 250 MG/1
TABLET, FILM COATED ORAL
Qty: 6 TABLET | Refills: 0 | Status: SHIPPED | OUTPATIENT
Start: 2021-07-20 | End: 2021-07-24

## 2021-07-20 NOTE — PATIENT INSTRUCTIONS
Continue to monitor symptoms  If new or worsening symptoms develop, go immediately to Er  Drink plenty of fluids  Follow up with Family Doctor this week  Pharyngitis   WHAT YOU NEED TO KNOW:   Pharyngitis, or sore throat, is inflammation of the tissues and structures in your pharynx (throat)  Pharyngitis is most often caused by bacteria  It may also be caused by a cold or flu virus  Other causes include smoking, allergies, or acid reflux  DISCHARGE INSTRUCTIONS:   Call 911 for any of the following:   · You have trouble breathing or swallowing because your throat is swollen or sore  Return to the emergency department if:   · You are drooling because it hurts too much to swallow  · Your fever is higher than 102? F (39?C) or lasts longer than 3 days  · You are confused  · You taste blood in your throat  Contact your healthcare provider if:   · Your throat pain gets worse  · You have a painful lump in your throat that does not go away after 5 days  · Your symptoms do not improve after 5 days  · You have questions or concerns about your condition or care  Medicines:  Viral pharyngitis will go away on its own without treatment  Your sore throat should start to feel better in 3 to 5 days for both viral and bacterial infections  You may need any of the following:  · Antibiotics  treat a bacterial infection  · NSAIDs , such as ibuprofen, help decrease swelling, pain, and fever  NSAIDs can cause stomach bleeding or kidney problems in certain people  If you take blood thinner medicine, always ask your healthcare provider if NSAIDs are safe for you  Always read the medicine label and follow directions  · Acetaminophen  decreases pain and fever  It is available without a doctor's order  Ask how much to take and how often to take it  Follow directions  Acetaminophen can cause liver damage if not taken correctly  · Take your medicine as directed    Contact your healthcare provider if you think your medicine is not helping or if you have side effects  Tell him or her if you are allergic to any medicine  Keep a list of the medicines, vitamins, and herbs you take  Include the amounts, and when and why you take them  Bring the list or the pill bottles to follow-up visits  Carry your medicine list with you in case of an emergency  Manage your symptoms:   · Gargle salt water  Mix ¼ teaspoon salt in an 8 ounce glass of warm water and gargle  This may help decrease swelling in your throat  · Drink liquids as directed  You may need to drink more liquids than usual  Liquids may help soothe your throat and prevent dehydration  Ask how much liquid to drink each day and which liquids are best for you  · Use a cool-steam humidifier  to help moisten the air in your room and calm your cough  · Soothe your throat  with cough drops, ice, soft foods, or popsicles  Prevent the spread of pharyngitis:  Cover your mouth and nose when you cough or sneeze  Do not share food or drinks  Wash your hands often  Use soap and water  If soap and water are unavailable, use an alcohol based hand   Follow up with your healthcare provider as directed:  Write down your questions so you remember to ask them during your visits  © Copyright [employer] 2021 Information is for End User's use only and may not be sold, redistributed or otherwise used for commercial purposes  All illustrations and images included in CareNotes® are the copyrighted property of [employer] Inc  or [employer]ha Roque  The above information is an  only  It is not intended as medical advice for individual conditions or treatments  Talk to your doctor, nurse or pharmacist before following any medical regimen to see if it is safe and effective for you

## 2021-07-20 NOTE — PROGRESS NOTES
[facility]        NAME: Tamra Morrow is a 42 y o  male  : 1980    MRN: [id number]  DATE: 2021  TIME: 2:52 PM    Assessment and Plan   Sore throat [J02 9]  1  Sore throat  POCT rapid strepA    Throat culture    azithromycin (ZITHROMAX) 250 mg tablet     Physical exam largely unremarkable  Sx persist >5-6 days  COVID (-)  Will start on azithromycin  Continue zurtek D and steroid  I educated pt to call PCP and schedule f/u for this week for further evaluation  I educated pt on signs and sx worsening condition and indications to f/u or go to ER  Pt states he understands and agrees  Patient Instructions       Continue to monitor symptoms  If new or worsening symptoms develop, go immediately to Er  Drink plenty of fluids  Follow up with Family Doctor this week  Chief Complaint     Chief Complaint   Patient presents with    Sore Throat     pt satets he has run out of steroid that prescribed on Friday and he feels like his throat is closing up and like it makes him have trouble breathing         History of Present Illness       Sore Throat   This is a new problem  Episode onset: Pt seen 2021 for URI with sore throat  COVID (-)  Pt was started on steroid and zyrtek D  Congestion has resplved but still has sore throat going down into chest  Neither side of throat is experiencing more pain than the other  There has been no fever  The pain is mild  Associated symptoms include congestion and coughing  Pertinent negatives include no abdominal pain, diarrhea, ear pain, headaches, neck pain, shortness of breath, trouble swallowing or vomiting  He has had no exposure to strep or mono  The treatment provided mild relief  States he has been having chills  Sweats  Review of Systems   Review of Systems   Constitutional: Negative for chills, diaphoresis, fatigue and fever  HENT: Positive for congestion, postnasal drip, sinus pressure, sinus pain and sore throat   Negative for ear pain, rhinorrhea, sneezing and trouble swallowing  Eyes: Negative  Respiratory: Positive for cough  Negative for chest tightness, shortness of breath and wheezing  Cardiovascular: Negative  Negative for chest pain and palpitations  Gastrointestinal: Negative for abdominal pain, diarrhea, nausea and vomiting  Endocrine: Negative  Genitourinary: Negative for dysuria  Musculoskeletal: Negative  Negative for back pain, neck pain and neck stiffness  Skin: Negative for pallor and rash  Allergic/Immunologic: Negative  Neurological: Negative  Negative for light-headedness and headaches  Hematological: Negative  Psychiatric/Behavioral: Negative            Current Medications       Current Outpatient Medications:     albuterol (ProAir HFA) 90 mcg/act inhaler, Inhale 2 puffs every 6 (six) hours as needed for shortness of breath, Disp: 8 5 g, Rfl: 0    methylPREDNISolone 4 MG tablet therapy pack, Use as directed on package, Disp: 1 each, Rfl: 0    ondansetron (ZOFRAN-ODT) 4 mg disintegrating tablet, Take 1 tablet (4 mg total) by mouth every 6 (six) hours as needed for nausea or vomiting, Disp: 20 tablet, Rfl: 0    albuterol (ProAir HFA) 90 mcg/act inhaler, Inhale 2 puffs every 6 (six) hours as needed for wheezing (Patient not taking: Reported on 7/20/2021), Disp: 8 5 g, Rfl: 0    albuterol (PROVENTIL HFA,VENTOLIN HFA) 90 mcg/act inhaler, Inhale 2 puffs every 6 (six) hours as needed for wheezing (Patient not taking: Reported on 7/16/2021), Disp: , Rfl:     albuterol (PROVENTIL HFA,VENTOLIN HFA) 90 mcg/act inhaler, Inhale 2 puffs every 6 (six) hours as needed for wheezing (Patient not taking: Reported on 7/16/2021), Disp: 1 Inhaler, Rfl: 0    ALPRAZolam (XANAX) 0 25 mg tablet, Take 1 tablet (0 25 mg total) by mouth 2 (two) times a day as needed for anxiety (Patient not taking: Reported on 7/16/2021), Disp: 60 tablet, Rfl: 3    azithromycin (ZITHROMAX) 250 mg tablet, Take 2 tablets today then 1 tablet daily x 4 days, Disp: 6 tablet, Rfl: 0    cetirizine (ZyrTEC) 10 mg tablet, Take 1 tablet (10 mg total) by mouth daily (Patient not taking: Reported on 7/16/2021), Disp: 30 tablet, Rfl: 5    citalopram (CeleXA) 20 mg tablet, Take 1/2 tab  daily for 1 week, them increase to 1 tab  daily (Patient not taking: Reported on 5/15/2021), Disp: 30 tablet, Rfl: 5    fluticasone (FLONASE) 50 mcg/act nasal spray, 2 sprays into each nostril daily (Patient not taking: Reported on 6/29/2020), Disp: 1 Bottle, Rfl: 1    fluticasone (FLONASE) 50 mcg/act nasal spray, 1 spray into each nostril daily (Patient not taking: Reported on 7/20/2021), Disp: 9 9 mL, Rfl: 0    multivitamin (THERAGRAN) TABS, Take 1 tablet by mouth daily (Patient not taking: Reported on 7/16/2021), Disp: , Rfl:     Current Allergies     Allergies as of 07/20/2021 - Reviewed 07/20/2021   Allergen Reaction Noted    Chocolate - food allergy Sneezing 11/28/2017            The following portions of the patient's history were reviewed and updated as appropriate: allergies, current medications, past family history, past medical history, past social history, past surgical history and problem list      Past Medical History:   Diagnosis Date    Allergic     Anxiety     Asthma     Obesity        Past Surgical History:   Procedure Laterality Date    MYRINGOTOMY W/ TUBES         Family History   Problem Relation Age of Onset    Diabetes Mother     Hypertension Mother     Alcohol abuse Father          Medications have been verified  Objective   Pulse 92   Temp (!) 97 2 °F (36 2 °C)   Resp 18   Ht 6' 3" (1 905 m)   Wt 122 kg (270 lb)   SpO2 97%   BMI 33 75 kg/m²        Physical Exam     Physical Exam  Vitals and nursing note reviewed  Constitutional:       General: He is not in acute distress  Appearance: Normal appearance  He is well-developed  He is not ill-appearing or diaphoretic  HENT:      Head: Normocephalic and atraumatic  Right Ear: Tympanic membrane, ear canal and external ear normal  No swelling or tenderness  Tympanic membrane is not erythematous  Left Ear: Tympanic membrane, ear canal and external ear normal  No swelling or tenderness  Tympanic membrane is not erythematous  Nose: Congestion present  No rhinorrhea  Mouth/Throat:      Pharynx: Posterior oropharyngeal erythema present  No oropharyngeal exudate or uvula swelling  Eyes:      General:         Right eye: No discharge  Left eye: No discharge  Conjunctiva/sclera: Conjunctivae normal    Cardiovascular:      Rate and Rhythm: Normal rate and regular rhythm  Heart sounds: Normal heart sounds  Pulmonary:      Effort: Pulmonary effort is normal  No respiratory distress  Breath sounds: Normal breath sounds  No wheezing, rhonchi or rales  Musculoskeletal:      Cervical back: Normal range of motion and neck supple  No tenderness  Lymphadenopathy:      Cervical: No cervical adenopathy  Skin:     General: Skin is warm  Capillary Refill: Capillary refill takes less than 2 seconds  Findings: No erythema or rash  Neurological:      Mental Status: He is alert

## 2021-07-23 LAB — BACTERIA THROAT CULT: ABNORMAL

## 2021-08-06 ENCOUNTER — OFFICE VISIT (OUTPATIENT)
Dept: URGENT CARE | Facility: MEDICAL CENTER | Age: 41
End: 2021-08-06
Payer: COMMERCIAL

## 2021-08-06 VITALS — HEART RATE: 98 BPM | TEMPERATURE: 97 F | OXYGEN SATURATION: 96 % | RESPIRATION RATE: 20 BRPM

## 2021-08-06 DIAGNOSIS — R53.83 FATIGUE, UNSPECIFIED TYPE: Primary | ICD-10-CM

## 2021-08-06 DIAGNOSIS — J02.9 SORE THROAT: ICD-10-CM

## 2021-08-06 LAB — S PYO AG THROAT QL: NEGATIVE

## 2021-08-06 PROCEDURE — 87070 CULTURE OTHR SPECIMN AEROBIC: CPT | Performed by: PHYSICIAN ASSISTANT

## 2021-08-06 PROCEDURE — U0003 INFECTIOUS AGENT DETECTION BY NUCLEIC ACID (DNA OR RNA); SEVERE ACUTE RESPIRATORY SYNDROME CORONAVIRUS 2 (SARS-COV-2) (CORONAVIRUS DISEASE [COVID-19]), AMPLIFIED PROBE TECHNIQUE, MAKING USE OF HIGH THROUGHPUT TECHNOLOGIES AS DESCRIBED BY CMS-2020-01-R: HCPCS | Performed by: PHYSICIAN ASSISTANT

## 2021-08-06 PROCEDURE — 99213 OFFICE O/P EST LOW 20 MIN: CPT | Performed by: PHYSICIAN ASSISTANT

## 2021-08-06 PROCEDURE — 87880 STREP A ASSAY W/OPTIC: CPT | Performed by: PHYSICIAN ASSISTANT

## 2021-08-06 PROCEDURE — U0005 INFEC AGEN DETEC AMPLI PROBE: HCPCS | Performed by: PHYSICIAN ASSISTANT

## 2021-08-06 RX ORDER — PREDNISONE 10 MG/1
TABLET ORAL
Qty: 30 TABLET | Refills: 0 | Status: SHIPPED | OUTPATIENT
Start: 2021-08-06 | End: 2021-08-14 | Stop reason: ALTCHOICE

## 2021-08-06 NOTE — PROGRESS NOTES
[facility]        NAME: Danica Nails is a 42 y o  male  : 1980    MRN: [id number]  DATE: 2021  TIME: 7:42 PM    Assessment and Plan   Fatigue, unspecified type [R53 83]  1  Fatigue, unspecified type  Novel Coronavirus (Covid-19),PCR [facility] - Office Collection    POCT rapid strepA    Throat culture   2  Sore throat  predniSONE 10 mg tablet         Patient Instructions       I explained to the patient that he has ongoing symptoms and he needs a follow-up of his family doctor and possibly ENT  Will check him for COVID  We recheck his rapid strep which was negative and was sent for culture  Follow up with PCP in 3-5 days  Chief Complaint     Chief Complaint   Patient presents with    Cold Like Symptoms     ST, HA, fatigue, body aches 1 week         History of Present Illness        Patient with history of allergies  Had   Recently been treated for strep C with a Z-Hardeep  Takes Zyrtec D and Flonase over the last week and he states he is no better  He is not vaccinated  He complains of cough nonproductive, congestion, headache and sore throat  Patient has history of seasonal allergies  Has associated myalgias and fatigue  Unknown exposure to COVID-19  Sore Throat   This is a new problem  The current episode started 1 to 4 weeks ago  The problem has been gradually worsening  There has been no fever  The pain is moderate  Associated symptoms include congestion, coughing and headaches  Pertinent negatives include no abdominal pain, diarrhea, drooling, ear discharge, ear pain, hoarse voice, plugged ear sensation, neck pain, shortness of breath, stridor, swollen glands, trouble swallowing or vomiting  The treatment provided no relief  Review of Systems   Review of Systems   HENT: Positive for congestion and sore throat  Negative for drooling, ear discharge, ear pain, hoarse voice and trouble swallowing  Respiratory: Positive for cough   Negative for shortness of breath and stridor  Gastrointestinal: Negative for abdominal pain, diarrhea and vomiting  Musculoskeletal: Negative for neck pain  Neurological: Positive for headaches  All other systems reviewed and are negative          Current Medications       Current Outpatient Medications:     albuterol (ProAir HFA) 90 mcg/act inhaler, Inhale 2 puffs every 6 (six) hours as needed for wheezing (Patient not taking: Reported on 7/20/2021), Disp: 8 5 g, Rfl: 0    albuterol (ProAir HFA) 90 mcg/act inhaler, Inhale 2 puffs every 6 (six) hours as needed for shortness of breath, Disp: 8 5 g, Rfl: 0    albuterol (PROVENTIL HFA,VENTOLIN HFA) 90 mcg/act inhaler, Inhale 2 puffs every 6 (six) hours as needed for wheezing (Patient not taking: Reported on 7/16/2021), Disp: , Rfl:     albuterol (PROVENTIL HFA,VENTOLIN HFA) 90 mcg/act inhaler, Inhale 2 puffs every 6 (six) hours as needed for wheezing (Patient not taking: Reported on 7/16/2021), Disp: 1 Inhaler, Rfl: 0    ALPRAZolam (XANAX) 0 25 mg tablet, Take 1 tablet (0 25 mg total) by mouth 2 (two) times a day as needed for anxiety (Patient not taking: Reported on 7/16/2021), Disp: 60 tablet, Rfl: 3    cetirizine (ZyrTEC) 10 mg tablet, Take 1 tablet (10 mg total) by mouth daily (Patient not taking: Reported on 7/16/2021), Disp: 30 tablet, Rfl: 5    citalopram (CeleXA) 20 mg tablet, Take 1/2 tab  daily for 1 week, them increase to 1 tab  daily (Patient not taking: Reported on 5/15/2021), Disp: 30 tablet, Rfl: 5    fluticasone (FLONASE) 50 mcg/act nasal spray, 2 sprays into each nostril daily (Patient not taking: Reported on 6/29/2020), Disp: 1 Bottle, Rfl: 1    fluticasone (FLONASE) 50 mcg/act nasal spray, 1 spray into each nostril daily (Patient not taking: Reported on 7/20/2021), Disp: 9 9 mL, Rfl: 0    methylPREDNISolone 4 MG tablet therapy pack, Use as directed on package, Disp: 1 each, Rfl: 0    multivitamin (THERAGRAN) TABS, Take 1 tablet by mouth daily (Patient not taking: Reported on 7/16/2021), Disp: , Rfl:     ondansetron (ZOFRAN-ODT) 4 mg disintegrating tablet, Take 1 tablet (4 mg total) by mouth every 6 (six) hours as needed for nausea or vomiting, Disp: 20 tablet, Rfl: 0    predniSONE 10 mg tablet, 5 x 4 days, 4 x 1, 3 x 1, 2 x 1, 1 x 1, Disp: 30 tablet, Rfl: 0    Current Allergies     Allergies as of 08/06/2021 - Reviewed 08/06/2021   Allergen Reaction Noted    Chocolate - food allergy Sneezing 11/28/2017            The following portions of the patient's history were reviewed and updated as appropriate: allergies, current medications, past family history, past medical history, past social history, past surgical history and problem list      Past Medical History:   Diagnosis Date    Allergic     Anxiety     Asthma     Obesity        Past Surgical History:   Procedure Laterality Date    MYRINGOTOMY W/ TUBES         Family History   Problem Relation Age of Onset    Diabetes Mother     Hypertension Mother     Alcohol abuse Father          Medications have been verified  Objective   Pulse 98   Temp (!) 97 °F (36 1 °C)   Resp 20   SpO2 96%   No LMP for male patient  Physical Exam     Physical Exam  Vitals and nursing note reviewed  Constitutional:       Appearance: Normal appearance  He is obese  HENT:      Nose: Congestion and rhinorrhea present  Cardiovascular:      Rate and Rhythm: Normal rate and regular rhythm  Pulses: Normal pulses  Heart sounds: Normal heart sounds  Pulmonary:      Effort: Pulmonary effort is normal       Breath sounds: Normal breath sounds  Neurological:      Mental Status: He is alert  TMs bulging  Nasal mucosa boggy  Good transillumination of the sinuses  Negative anterior   And posterior nodes  Throat mildly red and fully patent

## 2021-08-07 LAB — SARS-COV-2 RNA RESP QL NAA+PROBE: NEGATIVE

## 2021-08-09 LAB — BACTERIA THROAT CULT: NORMAL

## 2021-08-14 ENCOUNTER — OFFICE VISIT (OUTPATIENT)
Dept: FAMILY MEDICINE CLINIC | Facility: CLINIC | Age: 41
End: 2021-08-14
Payer: COMMERCIAL

## 2021-08-14 VITALS
DIASTOLIC BLOOD PRESSURE: 90 MMHG | TEMPERATURE: 98.7 F | WEIGHT: 268 LBS | OXYGEN SATURATION: 97 % | SYSTOLIC BLOOD PRESSURE: 144 MMHG | RESPIRATION RATE: 16 BRPM | HEART RATE: 100 BPM | HEIGHT: 75 IN | BODY MASS INDEX: 33.32 KG/M2

## 2021-08-14 DIAGNOSIS — I10 ESSENTIAL HYPERTENSION: ICD-10-CM

## 2021-08-14 DIAGNOSIS — J30.1 SEASONAL ALLERGIC RHINITIS DUE TO POLLEN: ICD-10-CM

## 2021-08-14 DIAGNOSIS — F41.8 ANXIETY ASSOCIATED WITH DEPRESSION: ICD-10-CM

## 2021-08-14 DIAGNOSIS — E66.09 CLASS 1 OBESITY DUE TO EXCESS CALORIES WITHOUT SERIOUS COMORBIDITY WITH BODY MASS INDEX (BMI) OF 33.0 TO 33.9 IN ADULT: ICD-10-CM

## 2021-08-14 DIAGNOSIS — J32.9 CHRONIC CONGESTION OF PARANASAL SINUS: Primary | ICD-10-CM

## 2021-08-14 PROCEDURE — 3725F SCREEN DEPRESSION PERFORMED: CPT | Performed by: FAMILY MEDICINE

## 2021-08-14 PROCEDURE — 99214 OFFICE O/P EST MOD 30 MIN: CPT | Performed by: FAMILY MEDICINE

## 2021-08-14 PROCEDURE — 1036F TOBACCO NON-USER: CPT | Performed by: FAMILY MEDICINE

## 2021-08-14 PROCEDURE — 3008F BODY MASS INDEX DOCD: CPT | Performed by: FAMILY MEDICINE

## 2021-08-14 RX ORDER — ALPRAZOLAM 0.25 MG/1
0.25 TABLET ORAL 2 TIMES DAILY PRN
Qty: 60 TABLET | Refills: 3 | Status: SHIPPED | OUTPATIENT
Start: 2021-08-14

## 2021-08-14 RX ORDER — MONTELUKAST SODIUM 10 MG/1
10 TABLET ORAL
Qty: 30 TABLET | Refills: 5 | Status: SHIPPED | OUTPATIENT
Start: 2021-08-14 | End: 2021-10-05

## 2021-08-14 NOTE — PROGRESS NOTES
Chief Complaint   Patient presents with    Follow-up     Health Maintenance   Topic Date Due    Hepatitis C Screening  Never done    Pneumococcal Vaccine: Pediatrics (0 to 5 Years) and At-Risk Patients (6 to 59 Years) (1 of 2 - PPSV23) Never done    COVID-19 Vaccine (1) Never done    HIV Screening  Never done    Annual Physical  Never done    DTaP,Tdap,and Td Vaccines (1 - Tdap) Never done    PT PLAN OF CARE  09/17/2020    BMI: Followup Plan  06/29/2021    Influenza Vaccine (1) 09/01/2021    BMI: Adult  08/14/2022    HIB Vaccine  Aged Out    Hepatitis B Vaccine  Aged Out    IPV Vaccine  Aged Out    Hepatitis A Vaccine  Aged Out    Meningococcal ACWY Vaccine  Aged Out    HPV Vaccine  Aged Out           BMI Counseling: Body mass index is 33 5 kg/m²  The BMI is above normal  Nutrition recommendations include decreasing portion sizes, encouraging healthy choices of fruits and vegetables, decreasing fast food intake, consuming healthier snacks, limiting drinks that contain sugar, moderation in carbohydrate intake, increasing intake of lean protein, reducing intake of saturated and trans fat and reducing intake of cholesterol  Exercise recommendations include exercising 3-5 times per week  No pharmacotherapy was ordered  Assessment/Plan:    Chronic congestion of paranasal sinus  Recommended ENT evaluation  Seasonal allergic rhinitis due to pollen  Start Singular 10 mg at bedtime  Continue Zyrtec 10 mg daily in the morning  Use Flonase nasal spray daily  Recommended to use saline nasal spray 2 times daily to moisturize nasal passages  Anxiety associated with depression  Patient stopped taking Citalopram 20 mg on his own because did not like how he felt on medication  Recommended to schedule appointment with Milka Bass LCSW to start psychotherapy  Take Alprazolam 0 25 mg daily PRN for anxiety symptoms  Will re-evaluate in 4 weeks      Class 1 obesity due to excess calories without serious comorbidity with body mass index (BMI) of 33 0 to 33 9 in adult  Discussed dietary and lifestyle modifications with patient  Encouraged regular exercise, follow a low carb, low-fat diet, work on weight reduction  Check labs  Essential hypertension  BP is elevated at 144/90  Recommended patient to follow a low-sodium diet, regular exercise, lose weight  Will recheck blood pressure in 4 weeks  Schedule follow-up visit in 4 weeks  Diagnoses and all orders for this visit:    Chronic congestion of paranasal sinus  -     Ambulatory Referral to Otolaryngology; Future    Seasonal allergic rhinitis due to pollen  -     Ambulatory Referral to Otolaryngology; Future  -     montelukast (SINGULAIR) 10 mg tablet; Take 1 tablet (10 mg total) by mouth daily at bedtime    Anxiety associated with depression  -     ALPRAZolam (XANAX) 0 25 mg tablet; Take 1 tablet (0 25 mg total) by mouth 2 (two) times a day as needed for anxiety    Class 1 obesity due to excess calories without serious comorbidity with body mass index (BMI) of 33 0 to 33 9 in adult    Essential hypertension  -     CBC and differential; Future  -     Comprehensive metabolic panel; Future  -     Lipid panel; Future  -     TSH, 3rd generation with Free T4 reflex; Future    Other orders  -     Cancel: PNEUMOCOCCAL POLYSACCHARIDE VACCINE 23-VALENT =>3YO SQ IM  -     Cancel: TDAP VACCINE GREATER THAN OR EQUAL TO 8YO IM          Subjective:      Patient ID: Jg Briones is a 42 y o  male  HPI     Patient presents for follow-up office visit  PMHx: seasonal allergies, AR, Obesity, Anxiety, Depression  He was seen at urgent care center on August 6, 2021 c/o fatigue, sore throat  Test for COVID-19 was negative  Throat culture was negative for Strep  He was prescribed short course of oral Prednisone      Still c/o persistent nasal congestion, sinus headache, postnasal drip, occasional cough, some tightness in the neck area   Denies chest tightness or wheezing  No fever  C/o feels hot, sweaty  Currently taking Zyrtec 10 mg daily and using OTC Flonase nasal spray  Patient gained 9 lb since last year  He had blood test done in September 2020  Cholesterol 171, HDL 48,   TSH 1 580  Anxiety/ Depression - patient stopped taking Citalopram 20 mg daily on his own because did not feel well on medication  C/o feeling anxious  Denies feeling depressed  Denies SI  Patient denies chest pain, shortness of breath, dizziness, heart palpitations  Denies tobacco, drug use  Drinks alcohol socially  The following portions of the patient's history were reviewed and updated as appropriate: allergies, current medications, past medical history, past social history, past surgical history and problem list     Review of Systems   Constitutional: Positive for fatigue  Negative for activity change, appetite change, chills and fever  HENT: Positive for congestion and postnasal drip  Negative for ear pain, hearing loss, mouth sores, nosebleeds, sore throat and trouble swallowing  Eyes: Negative for pain, discharge, redness and itching  Respiratory: Positive for cough (occasional)  Negative for chest tightness, shortness of breath and wheezing  Cardiovascular: Negative for chest pain, palpitations and leg swelling  Gastrointestinal: Negative for abdominal pain, blood in stool, constipation, diarrhea, nausea and vomiting  Genitourinary: Negative for difficulty urinating, dysuria, flank pain, frequency and hematuria  Musculoskeletal: Negative for gait problem, joint swelling and neck pain  Back muscle spasms   Skin: Negative for rash  Neurological: Positive for headaches  Negative for dizziness and syncope  Hematological: Negative  Psychiatric/Behavioral: Positive for sleep disturbance  Negative for dysphoric mood and suicidal ideas  The patient is nervous/anxious            Objective:      BP 144/90 (BP Location: Left arm, Patient Position: Sitting, Cuff Size: Large)   Pulse 100   Temp 98 7 °F (37 1 °C) (Tympanic)   Resp 16   Ht 6' 3" (1 905 m)   Wt 122 kg (268 lb)   SpO2 97%   BMI 33 50 kg/m²          Physical Exam  Vitals and nursing note reviewed  Constitutional:       Appearance: Normal appearance  He is obese  HENT:      Head: Normocephalic and atraumatic  Right Ear: Tympanic membrane and external ear normal       Left Ear: Tympanic membrane and external ear normal       Nose: Congestion present  Eyes:      Conjunctiva/sclera: Conjunctivae normal       Pupils: Pupils are equal, round, and reactive to light  Neck:      Vascular: No carotid bruit  Cardiovascular:      Rate and Rhythm: Regular rhythm  Tachycardia present  Heart sounds: No murmur heard  Pulmonary:      Effort: Pulmonary effort is normal       Breath sounds: Normal breath sounds  Abdominal:      General: There is no distension  Palpations: Abdomen is soft  Tenderness: There is no abdominal tenderness  Musculoskeletal:         General: No swelling, tenderness or deformity  Normal range of motion  Cervical back: Normal range of motion and neck supple  No tenderness  Right lower leg: No edema  Left lower leg: No edema  Lymphadenopathy:      Cervical: No cervical adenopathy  Skin:     General: Skin is warm and dry  Findings: No rash  Neurological:      Mental Status: He is alert     Psychiatric:      Comments: Feels anxious

## 2021-08-15 NOTE — ASSESSMENT & PLAN NOTE
BP is elevated at 144/90  Recommended patient to follow a low-sodium diet, regular exercise, lose weight  Will recheck blood pressure in 4 weeks  yes

## 2021-08-15 NOTE — ASSESSMENT & PLAN NOTE
Start Singular 10 mg at bedtime  Continue Zyrtec 10 mg daily in the morning  Use Flonase nasal spray daily  Recommended to use saline nasal spray 2 times daily to moisturize nasal passages

## 2021-08-15 NOTE — ASSESSMENT & PLAN NOTE
Discussed dietary and lifestyle modifications with patient  Encouraged regular exercise, follow a low carb, low-fat diet, work on weight reduction  Check labs

## 2021-08-15 NOTE — ASSESSMENT & PLAN NOTE
Patient stopped taking Citalopram 20 mg on his own because did not like how he felt on medication  Recommended to schedule appointment with Portia Sauer LCSW to start psychotherapy  Take Alprazolam 0 25 mg daily PRN for anxiety symptoms  Will re-evaluate in 4 weeks

## 2021-08-16 ENCOUNTER — TELEPHONE (OUTPATIENT)
Dept: FAMILY MEDICINE CLINIC | Facility: CLINIC | Age: 41
End: 2021-08-16

## 2021-08-16 NOTE — TELEPHONE ENCOUNTER
Please provide patient was with work excuse for today  He can try Singulair one more time ,if develops nausea again recommend to stop taking medication

## 2021-08-16 NOTE — LETTER
August 16, 2021     Patient: Pia Wilks   YOB: 1980   Date of Visit: 8/16/2021       To Whom it May Concern:    Pia Wilks is under my professional care  He was seen in my office on 8/16/2021  He may return to work on Tuesday, 8/17/2021  If you have any questions or concerns, please don't hesitate to call           Sincerely,          LALITO Lloyd         [address] [place] [address]: No Recipients

## 2021-08-16 NOTE — TELEPHONE ENCOUNTER
I called maris and created the letter, he will try to access it on [employer] and will swing by for a copy if he has any trouble

## 2021-08-16 NOTE — TELEPHONE ENCOUNTER
Pt states that he took his Singulair that was prescribed on Saturday by Dr Kemal Perkins last night and he has felt nauseas all morning would like to know if he could have an excuse for work for today   Please advise

## 2021-08-17 NOTE — TELEPHONE ENCOUNTER
I called and gave patient the recommendations  He also took off work today, requested a work excuse to return tomorrow

## 2021-08-17 NOTE — TELEPHONE ENCOUNTER
Patient called to report still has symptoms of nausea and loose bowels due to Singulair  Please call patient back at [phone]

## 2021-08-17 NOTE — TELEPHONE ENCOUNTER
Recommend patient to stop taking Singulair  Recommend to schedule appointment with ENT as discussed at the last visit

## 2021-09-11 ENCOUNTER — APPOINTMENT (OUTPATIENT)
Dept: LAB | Age: 41
End: 2021-09-11
Payer: COMMERCIAL

## 2021-09-11 DIAGNOSIS — I10 ESSENTIAL HYPERTENSION: ICD-10-CM

## 2021-09-11 LAB
ALBUMIN SERPL BCP-MCNC: 3.5 G/DL (ref 3.5–5)
ALP SERPL-CCNC: 66 U/L (ref 46–116)
ALT SERPL W P-5'-P-CCNC: 58 U/L (ref 12–78)
ANION GAP SERPL CALCULATED.3IONS-SCNC: 5 MMOL/L (ref 4–13)
AST SERPL W P-5'-P-CCNC: 23 U/L (ref 5–45)
BASOPHILS # BLD AUTO: 0.05 THOUSANDS/ΜL (ref 0–0.1)
BASOPHILS NFR BLD AUTO: 1 % (ref 0–1)
BILIRUB SERPL-MCNC: 0.69 MG/DL (ref 0.2–1)
BUN SERPL-MCNC: 19 MG/DL (ref 5–25)
CALCIUM SERPL-MCNC: 8.5 MG/DL (ref 8.3–10.1)
CHLORIDE SERPL-SCNC: 107 MMOL/L (ref 100–108)
CHOLEST SERPL-MCNC: 178 MG/DL (ref 50–200)
CO2 SERPL-SCNC: 27 MMOL/L (ref 21–32)
CREAT SERPL-MCNC: 0.72 MG/DL (ref 0.6–1.3)
EOSINOPHIL # BLD AUTO: 0.11 THOUSAND/ΜL (ref 0–0.61)
EOSINOPHIL NFR BLD AUTO: 1 % (ref 0–6)
ERYTHROCYTE [DISTWIDTH] IN BLOOD BY AUTOMATED COUNT: 12.9 % (ref 11.6–15.1)
GFR SERPL CREATININE-BSD FRML MDRD: 115 ML/MIN/1.73SQ M
GLUCOSE P FAST SERPL-MCNC: 157 MG/DL (ref 65–99)
HCT VFR BLD AUTO: 42.9 % (ref 36.5–49.3)
HDLC SERPL-MCNC: 42 MG/DL
HGB BLD-MCNC: 14.1 G/DL (ref 12–17)
IMM GRANULOCYTES # BLD AUTO: 0.03 THOUSAND/UL (ref 0–0.2)
IMM GRANULOCYTES NFR BLD AUTO: 0 % (ref 0–2)
LDLC SERPL CALC-MCNC: 115 MG/DL (ref 0–100)
LYMPHOCYTES # BLD AUTO: 2.2 THOUSANDS/ΜL (ref 0.6–4.47)
LYMPHOCYTES NFR BLD AUTO: 26 % (ref 14–44)
MCH RBC QN AUTO: 28.4 PG (ref 26.8–34.3)
MCHC RBC AUTO-ENTMCNC: 32.9 G/DL (ref 31.4–37.4)
MCV RBC AUTO: 86 FL (ref 82–98)
MONOCYTES # BLD AUTO: 0.7 THOUSAND/ΜL (ref 0.17–1.22)
MONOCYTES NFR BLD AUTO: 8 % (ref 4–12)
NEUTROPHILS # BLD AUTO: 5.33 THOUSANDS/ΜL (ref 1.85–7.62)
NEUTS SEG NFR BLD AUTO: 64 % (ref 43–75)
NONHDLC SERPL-MCNC: 136 MG/DL
NRBC BLD AUTO-RTO: 0 /100 WBCS
PLATELET # BLD AUTO: 262 THOUSANDS/UL (ref 149–390)
PMV BLD AUTO: 11.7 FL (ref 8.9–12.7)
POTASSIUM SERPL-SCNC: 4.2 MMOL/L (ref 3.5–5.3)
PROT SERPL-MCNC: 7.5 G/DL (ref 6.4–8.2)
RBC # BLD AUTO: 4.97 MILLION/UL (ref 3.88–5.62)
SODIUM SERPL-SCNC: 139 MMOL/L (ref 136–145)
TRIGL SERPL-MCNC: 104 MG/DL
TSH SERPL DL<=0.05 MIU/L-ACNC: 1.42 UIU/ML (ref 0.36–3.74)
WBC # BLD AUTO: 8.42 THOUSAND/UL (ref 4.31–10.16)

## 2021-09-11 PROCEDURE — 84443 ASSAY THYROID STIM HORMONE: CPT

## 2021-09-11 PROCEDURE — 80061 LIPID PANEL: CPT

## 2021-09-11 PROCEDURE — 80053 COMPREHEN METABOLIC PANEL: CPT

## 2021-09-11 PROCEDURE — 85025 COMPLETE CBC W/AUTO DIFF WBC: CPT

## 2021-09-11 PROCEDURE — 36415 COLL VENOUS BLD VENIPUNCTURE: CPT

## 2021-09-17 PROBLEM — R73.9 HYPERGLYCEMIA: Status: ACTIVE | Noted: 2021-09-17

## 2021-09-17 PROBLEM — E78.5 DYSLIPIDEMIA: Status: ACTIVE | Noted: 2021-09-17

## 2021-09-18 ENCOUNTER — OFFICE VISIT (OUTPATIENT)
Dept: FAMILY MEDICINE CLINIC | Facility: CLINIC | Age: 41
End: 2021-09-18
Payer: COMMERCIAL

## 2021-09-18 VITALS
HEART RATE: 90 BPM | OXYGEN SATURATION: 97 % | SYSTOLIC BLOOD PRESSURE: 150 MMHG | WEIGHT: 272 LBS | HEIGHT: 75 IN | RESPIRATION RATE: 16 BRPM | DIASTOLIC BLOOD PRESSURE: 94 MMHG | TEMPERATURE: 98 F | BODY MASS INDEX: 33.82 KG/M2

## 2021-09-18 DIAGNOSIS — I10 ESSENTIAL HYPERTENSION: Primary | ICD-10-CM

## 2021-09-18 DIAGNOSIS — F41.8 ANXIETY ASSOCIATED WITH DEPRESSION: ICD-10-CM

## 2021-09-18 DIAGNOSIS — R73.9 HYPERGLYCEMIA: ICD-10-CM

## 2021-09-18 DIAGNOSIS — E11.65 TYPE 2 DIABETES MELLITUS WITH HYPERGLYCEMIA, WITHOUT LONG-TERM CURRENT USE OF INSULIN (HCC): ICD-10-CM

## 2021-09-18 DIAGNOSIS — J30.1 SEASONAL ALLERGIC RHINITIS DUE TO POLLEN: ICD-10-CM

## 2021-09-18 DIAGNOSIS — E78.5 DYSLIPIDEMIA: ICD-10-CM

## 2021-09-18 PROBLEM — E11.9 TYPE 2 DIABETES MELLITUS, WITHOUT LONG-TERM CURRENT USE OF INSULIN (HCC): Status: ACTIVE | Noted: 2021-09-18

## 2021-09-18 LAB — SL AMB POCT HEMOGLOBIN AIC: 7.3 (ref ?–6.5)

## 2021-09-18 PROCEDURE — 4010F ACE/ARB THERAPY RXD/TAKEN: CPT | Performed by: FAMILY MEDICINE

## 2021-09-18 PROCEDURE — 3008F BODY MASS INDEX DOCD: CPT | Performed by: FAMILY MEDICINE

## 2021-09-18 PROCEDURE — 3051F HG A1C>EQUAL 7.0%<8.0%: CPT | Performed by: FAMILY MEDICINE

## 2021-09-18 PROCEDURE — 99215 OFFICE O/P EST HI 40 MIN: CPT | Performed by: FAMILY MEDICINE

## 2021-09-18 PROCEDURE — 83036 HEMOGLOBIN GLYCOSYLATED A1C: CPT | Performed by: FAMILY MEDICINE

## 2021-09-18 PROCEDURE — 1036F TOBACCO NON-USER: CPT | Performed by: FAMILY MEDICINE

## 2021-09-18 RX ORDER — BLOOD SUGAR DIAGNOSTIC
STRIP MISCELLANEOUS
Qty: 200 EACH | Refills: 3 | Status: SHIPPED | OUTPATIENT
Start: 2021-09-18

## 2021-09-18 RX ORDER — CLINDAMYCIN HYDROCHLORIDE 150 MG/1
CAPSULE ORAL
COMMUNITY
Start: 2021-09-16

## 2021-09-18 RX ORDER — LANCETS 33 GAUGE
EACH MISCELLANEOUS
Qty: 200 EACH | Refills: 3 | Status: SHIPPED | OUTPATIENT
Start: 2021-09-18

## 2021-09-18 RX ORDER — BENAZEPRIL HYDROCHLORIDE 10 MG/1
10 TABLET ORAL DAILY
Qty: 30 TABLET | Refills: 5 | Status: SHIPPED | OUTPATIENT
Start: 2021-09-18 | End: 2021-11-09

## 2021-09-18 RX ORDER — BLOOD-GLUCOSE METER
EACH MISCELLANEOUS
Qty: 1 KIT | Refills: 0 | Status: SHIPPED | OUTPATIENT
Start: 2021-09-18

## 2021-09-18 NOTE — ASSESSMENT & PLAN NOTE
Reviewed dietary and lifestyle modifications with patient  Recommended to follow a low-cholesterol diet, increase exercise

## 2021-09-18 NOTE — ASSESSMENT & PLAN NOTE
Lab Results   Component Value Date    HGBA1C 7 3 (A) 09/18/2021     Newly diagnosed Type 2 DM  Recommended to schedule nutrition counseling, diabetic education  Recommended to follow a low carb diet, regular exercise, work on weight reduction  Will start Metformin 500 mg 1 tablet twice daily with meals  Start checking blood sugar at home twice daily and call with blood sugar log in 2 weeks

## 2021-09-18 NOTE — ASSESSMENT & PLAN NOTE
Continue Singulair 10 mg at bedtime  Take Zyrtec 10 mg in the morning  Continue to use Flonase nasal spray daily  Appointment scheduled with ENT Dr John Mendenhall on October 8, 2021

## 2021-09-18 NOTE — ASSESSMENT & PLAN NOTE
Mood has been stable  Patient does not want to schedule psychotherapy at present time  Recommended to practice relaxation techniques  Take Alprazolam 0 25 mg daily PRN for anxiety symptoms

## 2021-09-18 NOTE — PROGRESS NOTES
Chief Complaint   Patient presents with    Follow-up     4 week follow up     Health Maintenance   Topic Date Due    Hepatitis C Screening  Never done    Pneumococcal Vaccine: Pediatrics (0 to 5 Years) and At-Risk Patients (6 to 59 Years) (1 of 2 - PPSV23) Never done    Diabetic Foot Exam  Never done    DM Eye Exam  Never done    COVID-19 Vaccine (1) Never done    HIV Screening  Never done    Annual Physical  Never done    DTaP,Tdap,and Td Vaccines (1 - Tdap) Never done    PT PLAN OF CARE  09/17/2020    Influenza Vaccine (1) 09/01/2021    HEMOGLOBIN A1C  03/18/2022    BMI: Followup Plan  08/15/2022    BMI: Adult  09/18/2022    HIB Vaccine  Aged Out    Hepatitis B Vaccine  Aged Out    IPV Vaccine  Aged Out    Hepatitis A Vaccine  Aged Out    Meningococcal ACWY Vaccine  Aged Out    HPV Vaccine  Aged Out                  Assessment/Plan:    Essential hypertension  Blood pressure remains elevated  Will start Benazepril 10 mg 1 tablet daily  Recommended follow a low-sodium diet  Start checking blood pressure at home, bring BP log for next visit in 4 weeks  Check BMP in 2-3 weeks  Seasonal allergic rhinitis due to pollen  Continue Singulair 10 mg at bedtime  Take Zyrtec 10 mg in the morning  Continue to use Flonase nasal spray daily  Appointment scheduled with ENT Dr Ramesh Rivas on October 8, 2021  Anxiety associated with depression  Mood has been stable  Patient does not want to schedule psychotherapy at present time  Recommended to practice relaxation techniques  Take Alprazolam 0 25 mg daily PRN for anxiety symptoms  Type 2 diabetes mellitus, without long-term current use of insulin (HCC)    Lab Results   Component Value Date    HGBA1C 7 3 (A) 09/18/2021     Newly diagnosed Type 2 DM  Recommended to schedule nutrition counseling, diabetic education  Recommended to follow a low carb diet, regular exercise, work on weight reduction        Will start Metformin 500 mg 1 tablet twice daily with meals  Start checking blood sugar at home twice daily and call with blood sugar log in 2 weeks  Dyslipidemia  Reviewed dietary and lifestyle modifications with patient  Recommended to follow a low-cholesterol diet, increase exercise  HM:  patient states that he had Tdap done 2 years ago  Declines Flu vaccination  Schedule follow-up office visit in 4 weeks  Diagnoses and all orders for this visit:    Essential hypertension  -     benazepril (LOTENSIN) 10 mg tablet; Take 1 tablet (10 mg total) by mouth daily  -     Basic metabolic panel; Future    Seasonal allergic rhinitis due to pollen    Anxiety associated with depression    Hyperglycemia  -     POCT hemoglobin A1c    Type 2 diabetes mellitus with hyperglycemia, without long-term current use of insulin (HCC)  -     metFORMIN (GLUCOPHAGE) 500 mg tablet; Take 1 tablet (500 mg total) by mouth 2 (two) times a day with meals  -     Ambulatory referral to Nutrition Services; Future  -     Basic metabolic panel; Future  -     Ambulatory referral to Diabetic Education; Future  -     Lancets (OneTouch Delica Plus [id number]) MISC; Test blood sugar twice daily  -     glucose blood (OneTouch Ultra) test strip; Test blood sugar twice daily  -     Blood Glucose Monitoring Suppl (ONE TOUCH ULTRA 2) w/Device KIT; Test blood sugar twice daily    Dyslipidemia  -     Ambulatory referral to Nutrition Services; Future    Other orders  -     Cancel: PNEUMOCOCCAL POLYSACCHARIDE VACCINE 23-VALENT =>1YO SQ IM  -     Cancel: TDAP VACCINE GREATER THAN OR EQUAL TO 8YO IM  -     Cancel: influenza vaccine, quadrivalent, 0 5 mL, preservative-free, for adult and pediatric patients 6 mos+ (AFLURIA, FLUARIX, FLULAVAL, FLUZONE)  -     clindamycin (CLEOCIN) 150 mg capsule          Subjective:      Patient ID: Omkar Dubois is a 42 y o  male  HPI     Patient presents for 4 week follow-up office visit, review blood test results        Blood test done on September 11, 2021  TSH 1 420, fasting blood sugar 157, creatinine 0 72, potassium 4 2  Cholesterol 178, triglycerides 104, HDL 42,   Family history is positive for diabetes in his mother  Patient c/o frequent headaches  He had 2 teeth extracted 2 days ago, had bad migraine headache yesterday  No headache today  HTN - BP remains elevated  Denies chest pain, dizziness, SOB  Allergic rhinitis- started on Singulair 10 mg at bedtime at the last visit in August     Still c/o some nasal congestion  He scheduled appointment with ENT Dr Bindu Prado on October 8, 2021  Anxiety/ Depression - mood has been stable  Patient did not schedule appointment with Milka Bass for psychotherapy  Reports no anxiety attacks  He takes Alprazolam 0 25 mg twice daily PRN  Denies tobacco, drug use  Drinks alcohol socially  The following portions of the patient's history were reviewed and updated as appropriate: allergies, current medications, past medical history, past social history, past surgical history and problem list     Review of Systems   Constitutional: Negative for activity change, appetite change, chills, fatigue and fever  HENT: Positive for congestion and postnasal drip  Negative for hearing loss, mouth sores, nosebleeds, sore throat, tinnitus and trouble swallowing  Eyes: Negative for pain, discharge, redness and itching  Respiratory: Negative for cough, chest tightness, shortness of breath and wheezing  Cardiovascular: Negative for chest pain, palpitations and leg swelling  Gastrointestinal: Negative for abdominal pain, constipation, diarrhea, nausea and vomiting  Genitourinary: Negative for difficulty urinating, dysuria, flank pain, frequency and hematuria  Musculoskeletal: Negative for arthralgias, back pain and neck pain  Skin: Negative for rash  Neurological: Positive for headaches  Negative for dizziness  Hematological: Negative  Psychiatric/Behavioral: Positive for sleep disturbance  Negative for dysphoric mood  Anxiety - mood has been stable         Objective:      /94 (BP Location: Left arm, Patient Position: Sitting, Cuff Size: Large)   Pulse 90   Temp 98 °F (36 7 °C) (Tympanic)   Resp 16   Ht 6' 3" (1 905 m)   Wt 123 kg (272 lb)   SpO2 97%   BMI 34 00 kg/m²          Physical Exam  Vitals and nursing note reviewed  Constitutional:       Appearance: Normal appearance  He is obese  HENT:      Head: Normocephalic and atraumatic  Right Ear: Tympanic membrane normal       Left Ear: Tympanic membrane normal       Nose: Congestion (mild) present  Eyes:      Conjunctiva/sclera: Conjunctivae normal       Pupils: Pupils are equal, round, and reactive to light  Cardiovascular:      Rate and Rhythm: Regular rhythm  Heart sounds: No murmur heard  Pulmonary:      Effort: Pulmonary effort is normal       Breath sounds: Normal breath sounds  Abdominal:      General: There is no distension  Palpations: Abdomen is soft  Tenderness: There is no abdominal tenderness  Musculoskeletal:         General: No swelling, tenderness or deformity  Normal range of motion  Cervical back: Normal range of motion and neck supple  Right lower leg: No edema  Left lower leg: No edema  Skin:     General: Skin is warm and dry  Findings: No rash  Neurological:      Mental Status: He is alert     Psychiatric:         Mood and Affect: Mood normal

## 2021-09-18 NOTE — ASSESSMENT & PLAN NOTE
Blood pressure remains elevated  Will start Benazepril 10 mg 1 tablet daily  Recommended follow a low-sodium diet  Start checking blood pressure at home, bring BP log for next visit in 4 weeks  Check BMP in 2-3 weeks

## 2021-09-30 ENCOUNTER — OFFICE VISIT (OUTPATIENT)
Dept: URGENT CARE | Facility: MEDICAL CENTER | Age: 41
End: 2021-09-30
Payer: COMMERCIAL

## 2021-09-30 VITALS
OXYGEN SATURATION: 97 % | TEMPERATURE: 98 F | DIASTOLIC BLOOD PRESSURE: 89 MMHG | RESPIRATION RATE: 18 BRPM | SYSTOLIC BLOOD PRESSURE: 159 MMHG | HEART RATE: 88 BPM

## 2021-09-30 DIAGNOSIS — R42 DIZZINESS AND GIDDINESS: Primary | ICD-10-CM

## 2021-09-30 DIAGNOSIS — R42 LIGHTHEADEDNESS: ICD-10-CM

## 2021-09-30 DIAGNOSIS — R19.7 DIARRHEA, UNSPECIFIED TYPE: ICD-10-CM

## 2021-09-30 DIAGNOSIS — R10.11 RIGHT UPPER QUADRANT ABDOMINAL PAIN: ICD-10-CM

## 2021-09-30 LAB
ATRIAL RATE: 75 BPM
GLUCOSE SERPL-MCNC: 95 MG/DL (ref 65–140)
P AXIS: 20 DEGREES
PR INTERVAL: 134 MS
QRS AXIS: -8 DEGREES
QRSD INTERVAL: 98 MS
QT INTERVAL: 380 MS
QTC INTERVAL: 424 MS
SL AMB POCT GLUCOSE BLD: 95
T WAVE AXIS: 39 DEGREES
VENTRICULAR RATE: 75 BPM

## 2021-09-30 PROCEDURE — 93005 ELECTROCARDIOGRAM TRACING: CPT | Performed by: PHYSICIAN ASSISTANT

## 2021-09-30 PROCEDURE — 93010 ELECTROCARDIOGRAM REPORT: CPT | Performed by: INTERNAL MEDICINE

## 2021-09-30 PROCEDURE — 99213 OFFICE O/P EST LOW 20 MIN: CPT | Performed by: PHYSICIAN ASSISTANT

## 2021-09-30 PROCEDURE — 82948 REAGENT STRIP/BLOOD GLUCOSE: CPT | Performed by: PHYSICIAN ASSISTANT

## 2021-09-30 NOTE — PATIENT INSTRUCTIONS
Abdominal Pain   WHAT YOU NEED TO KNOW:   Abdominal pain can be dull, achy, or sharp  You may have pain in one area of your abdomen, or in your entire abdomen  Your pain may be caused by a condition such as constipation, food sensitivity or poisoning, infection, or a blockage  Abdominal pain can also be from a hernia, appendicitis, or an ulcer  Liver, gallbladder, or kidney conditions can also cause abdominal pain  The cause of your abdominal pain may be unknown  DISCHARGE INSTRUCTIONS:   Return to the emergency department if:   · You have new chest pain or shortness of breath  · You have pulsing pain in your upper abdomen or lower back that suddenly becomes constant  · Your pain is in the right lower abdominal area and worsens with movement  · You have a fever over 100 4°F (38°C) or shaking chills  · You are vomiting and cannot keep food or liquids down  · Your pain does not improve or gets worse over the next 8 to 12 hours  · You see blood in your vomit or bowel movements, or they look black and tarry  · Your skin or the whites of your eyes turn yellow  · You are a woman and have a large amount of vaginal bleeding that is not your monthly period  Contact your healthcare provider if:   · You have pain in your lower back  · You are a man and have pain in your testicles  · You have pain when you urinate  · You have questions or concerns about your condition or care  Follow up with your healthcare provider within 24 hours or as directed:  Write down your questions so you remember to ask them during your visits  Medicines:   · Medicines  may be given to calm your stomach and prevent vomiting or to decrease pain  Ask how to take pain medicine safely  · Take your medicine as directed  Contact your healthcare provider if you think your medicine is not helping or if you have side effects  Tell him of her if you are allergic to any medicine   Keep a list of the medicines, vitamins, and herbs you take  Include the amounts, and when and why you take them  Bring the list or the pill bottles to follow-up visits  Carry your medicine list with you in case of an emergency  © Copyright [employer] 2021 Information is for End User's use only and may not be sold, redistributed or otherwise used for commercial purposes  All illustrations and images included in CareNotes® are the copyrighted property of [employer] Inc  or Marsha Holland   The above information is an  only  It is not intended as medical advice for individual conditions or treatments  Talk to your doctor, nurse or pharmacist before following any medical regimen to see if it is safe and effective for you  Traveler's Diarrhea   WHAT YOU NEED TO KNOW:   Traveler's diarrhea occurs during travel or within 10 days after you travel  You can get traveler's diarrhea when you eat or drink contaminated food or water  The food or water may contain bacteria, a virus, or a parasite  Water from a faucet, ice, or drinks that are not sealed can be contaminated  Foods that are prepared with tap water or not cooked properly can also be contaminated  DISCHARGE INSTRUCTIONS:   Return to the emergency department if:   · You urinate less than usual or stop urinating  · You see blood in your bowel movement  · You have severe abdominal pain  · You feel like you are going to faint  · You are too weak to stand up  · You are dizzy or lightheaded  Contact your healthcare provider if:   · Your symptoms do not get better with treatment  · Your diarrhea lasts for more than 7 days  · You have questions or concerns about your condition or care  Medicines:   · Medicines  can help decrease diarrhea or nausea, or treat an infection caused by bacteria or a parasite  · Take your medicine as directed    Contact your healthcare provider if you think your medicine is not helping or if you have side effects  Tell him of her if you are allergic to any medicine  Keep a list of the medicines, vitamins, and herbs you take  Include the amounts, and when and why you take them  Bring the list or the pill bottles to follow-up visits  Carry your medicine list with you in case of an emergency  Manage your symptoms:   · Drink liquids as directed  Liquids will help prevent dehydration caused by diarrhea  Ask your healthcare provider how much liquid to drink each day and which liquids are best for you  You may need to drink an oral rehydration solution (ORS)  An ORS has the right amounts of water, salts, and sugar you need to replace body fluids  You can buy an ORS at most grocery stores and pharmacies  · Eat foods that are easy to digest   Examples include rice, lentils, cereal, bananas, potatoes, and bread  It also includes some fruits (bananas, melon), well-cooked vegetables, and lean meats  Do not drink alcohol until your diarrhea is gone  Prevent traveler's diarrhea:   · Ask if you should take certain medicines or get vaccines before you travel  Your healthcare provider may prescribe antibiotics to prevent traveler's diarrhea  Vaccines can help protect you against bacteria or viruses that cause traveler's diarrhea  · Drink only bottled, canned, or boiled liquids when you travel  Do not put ice in your drinks  Boil water for at least 4 minutes, or use purifying tablets to treat the water  Use bottled or treated water to brush your teeth  · Do not eat raw food or dairy when you travel  Examples include fruits, raw vegetables in salads, oysters, clams, or undercooked meat  Do not have milk, ice cream, or other dairy products  Eat foods that are served hot or steaming, breads, peeled fruits and vegetables, and grilled foods  · Wash your hands often  Use bottled water and soap  Wash your hands before you eat or prepare food  Also wash your hands after you use the bathroom         Follow up with your healthcare provider as directed:  Write down your questions so you remember to ask them during your visits  © Copyright [employer] 2021 Information is for End User's use only and may not be sold, redistributed or otherwise used for commercial purposes  All illustrations and images included in CareNotes® are the copyrighted property of A [employer] M [employer] Inc  or Marsha Roque  The above information is an  only  It is not intended as medical advice for individual conditions or treatments  Talk to your doctor, nurse or pharmacist before following any medical regimen to see if it is safe and effective for you  Lightheadedness   WHAT YOU NEED TO KNOW:   Lightheadedness is the feeling that you may faint, but you do not  Your heartbeat may be fast or feel like it flutters  Lightheadedness may occur when you take certain medicines, such as medicine to lower your blood pressure  Dehydration, low sodium, low blood sugar, an abnormal heart rhythm, and anxiety are other common causes  DISCHARGE INSTRUCTIONS:   Return to the emergency department if:   · You have sudden chest pain  · You have trouble breathing or shortness of breath  · You have vision changes, are sweating, and have nausea while you are sitting or lying down  · You feel flushed and your heart is fluttering  · You faint  Contact your healthcare provider if:   · You feel lightheaded often  · Your heart beats faster or slower than usual      · You have questions or concerns about your condition or care  Follow up with your healthcare provider as directed: You may need more tests to help find the cause of your lightheadedness  The tests will help healthcare providers plan the best treatment for you  Write down your questions so you remember to ask them during your visits     Self-care:  Talk with your healthcare provider about these and other ways to manage your symptoms:  · Lie down  when you feel lightheaded, your throat gets tight, or your vision changes  Raise your legs above the level of your heart  · Stand up slowly  Sit on the side of the bed or couch for a few minutes before you stand up  · Take slow, deep breaths when you feel lightheaded  This can help decrease the feeling that you might faint  · Ask if you need to avoid hot baths and saunas  These may make your symptoms worse  Watch for signs of low blood sugar: These include hunger, nervousness, sweating, and fast or fluttery heartbeats  Talk with your healthcare provider about ways to keep your blood sugar level steady  Check your blood pressure often:  You should do this especially if you take medicine to lower your blood pressure  Check your blood pressure when you are lying down and when you are standing  Ask how often to check during the day  Keep a record of your blood pressure numbers  Your healthcare provider may use the record to help plan your treatment  Keep a record of your lightheadedness episodes:  Include your symptoms and your activity before and after the episode  The record can help your healthcare provider find the cause of your lightheadedness and help you manage episodes  © Copyright [employer] 2021 Information is for End User's use only and may not be sold, redistributed or otherwise used for commercial purposes  All illustrations and images included in CareNotes® are the copyrighted property of A [employer] A M , Inc  or [facility] Palomo [employer]   The above information is an  only  It is not intended as medical advice for individual conditions or treatments  Talk to your doctor, nurse or pharmacist before following any medical regimen to see if it is safe and effective for you  Dizziness   WHAT YOU NEED TO KNOW:   Dizziness is a feeling of being off balance or unsteady  Common causes of dizziness are an inner ear fluid imbalance or a lack of oxygen in your blood   Dizziness may be acute (lasts 3 days or less) or chronic (lasts longer than 3 days)  You may have dizzy spells that last from seconds to a few hours  DISCHARGE INSTRUCTIONS:   Return to the emergency department if:   · You have a headache and a stiff neck  · You have shaking chills and a fever  · You vomit over and over with no relief  · Your vomit or bowel movements are red or black  · You have pain in your chest, back, or abdomen  · You have numbness, especially in your face, arms, or legs  · You have trouble moving your arms or legs  · You are confused  Contact your healthcare provider if:   · You have a fever  · Your symptoms do not get better with treatment  · You have questions or concerns about your condition or care  Manage your symptoms:   · Do not drive  or operate heavy machinery when you are dizzy  · Get up slowly  from sitting or lying down  · Drink plenty of liquids  Liquids help prevent dehydration  Ask how much liquid to drink each day and which liquids are best for you  Follow up with your healthcare provider as directed:  Write down your questions so you remember to ask them during your visits  © Copyright [employer] 2021 Information is for End User's use only and may not be sold, redistributed or otherwise used for commercial purposes  All illustrations and images included in CareNotes® are the copyrighted property of A D [employer] M , Inc  or [facility] Palomo Holland   The above information is an  only  It is not intended as medical advice for individual conditions or treatments  Talk to your doctor, nurse or pharmacist before following any medical regimen to see if it is safe and effective for you

## 2021-09-30 NOTE — LETTER
September 30, 2021     Patient: Jg Briones   YOB: 1980   Date of Visit: 9/30/2021       To Whom It May Concern: It is my medical opinion that Jg Briones was seen in office 9/30/2021  If you have any questions or concerns, please don't hesitate to call           Sincerely,        Alecia Palafox PA-C    CC: No Recipients

## 2021-09-30 NOTE — PROGRESS NOTES
[facility]        NAME: Analisa Weems is a 42 y o  male  : 1980    MRN: [id number]  DATE: 2021  TIME: 10:38 AM    /89   Pulse 88   Temp 98 °F (36 7 °C) (Tympanic)   Resp 18   SpO2 97%     Assessment and Plan   Dizziness and giddiness [R42]  1  Dizziness and giddiness  ECG 12 lead    POCT blood glucose   2  Lightheadedness     3  Diarrhea, unspecified type     4  Right upper quadrant abdominal pain           Patient Instructions       Follow up with PCP in 3-5 days  Proceed to  ER if symptoms worsen  Chief Complaint     Chief Complaint   Patient presents with    Diarrhea     Patient relates started with diarrhea, weakness, and lightheaded since last night  Diarrhea x4 episodes  No blood in stools  Denies nausea and vomiting  C/O abdominal cramping  Denies fever, chest pain and SOB  Not vaccinated  No exposure to anyone positive that he knows of  History of Present Illness       Pt with dizziness and lightheadedness diarrhea and abdomen pain since yesterday       Review of Systems   Review of Systems   Constitutional: Negative  HENT: Negative  Eyes: Negative  Respiratory: Negative  Cardiovascular: Negative  Gastrointestinal: Positive for abdominal pain and diarrhea  Endocrine: Negative  Genitourinary: Negative  Musculoskeletal: Negative  Allergic/Immunologic: Negative  Neurological: Positive for dizziness and light-headedness  Hematological: Negative  Psychiatric/Behavioral: Negative  All other systems reviewed and are negative          Current Medications       Current Outpatient Medications:     albuterol (ProAir HFA) 90 mcg/act inhaler, Inhale 2 puffs every 6 (six) hours as needed for shortness of breath, Disp: 8 5 g, Rfl: 0    ALPRAZolam (XANAX) 0 25 mg tablet, Take 1 tablet (0 25 mg total) by mouth 2 (two) times a day as needed for anxiety, Disp: 60 tablet, Rfl: 3    benazepril (LOTENSIN) 10 mg tablet, Take 1 tablet (10 mg total) by mouth daily, Disp: 30 tablet, Rfl: 5    Blood Glucose Monitoring Suppl (ONE TOUCH ULTRA 2) w/Device KIT, Test blood sugar twice daily, Disp: 1 kit, Rfl: 0    glucose blood (OneTouch Ultra) test strip, Test blood sugar twice daily, Disp: 200 each, Rfl: 3    Lancets (OneTouch Delica Plus ICQONO12J) MISC, Test blood sugar twice daily, Disp: 200 each, Rfl: 3    metFORMIN (GLUCOPHAGE) 500 mg tablet, Take 1 tablet (500 mg total) by mouth 2 (two) times a day with meals, Disp: 60 tablet, Rfl: 5    montelukast (SINGULAIR) 10 mg tablet, Take 1 tablet (10 mg total) by mouth daily at bedtime, Disp: 30 tablet, Rfl: 5    cetirizine (ZyrTEC) 10 mg tablet, Take 1 tablet (10 mg total) by mouth daily (Patient not taking: Reported on 7/16/2021), Disp: 30 tablet, Rfl: 5    clindamycin (CLEOCIN) 150 mg capsule, , Disp: , Rfl:     fluticasone (FLONASE) 50 mcg/act nasal spray, 2 sprays into each nostril daily (Patient not taking: Reported on 9/18/2021), Disp: 1 Bottle, Rfl: 1    multivitamin (THERAGRAN) TABS, Take 1 tablet by mouth daily (Patient not taking: Reported on 7/16/2021), Disp: , Rfl:     Current Allergies     Allergies as of 09/30/2021 - Reviewed 09/30/2021   Allergen Reaction Noted    Chocolate - food allergy Sneezing 11/28/2017            The following portions of the patient's history were reviewed and updated as appropriate: allergies, current medications, past family history, past medical history, past social history, past surgical history and problem list      Past Medical History:   Diagnosis Date    Allergic     Anxiety     Asthma     Hypertension     Obesity        Past Surgical History:   Procedure Laterality Date    MYRINGOTOMY W/ TUBES         Family History   Problem Relation Age of Onset    Diabetes Mother     Hypertension Mother     Alcohol abuse Father          Medications have been verified          Objective   /89   Pulse 88   Temp 98 °F (36 7 °C) (Tympanic)   Resp 18 SpO2 97%        Physical Exam     Physical Exam  Vitals and nursing note reviewed  Constitutional:       Appearance: Normal appearance  He is normal weight  Comments: Pt taking new htn meds starting last week     Discussed with pt about er evaluation for dizziness lightheadedness diarrhea and abdomen pain  Pt will go to er    HENT:      Head: Normocephalic and atraumatic  Right Ear: Tympanic membrane, ear canal and external ear normal       Left Ear: Tympanic membrane, ear canal and external ear normal       Nose: Nose normal       Mouth/Throat:      Mouth: Mucous membranes are moist       Pharynx: Oropharynx is clear  Eyes:      Extraocular Movements: Extraocular movements intact  Conjunctiva/sclera: Conjunctivae normal       Pupils: Pupils are equal, round, and reactive to light  Cardiovascular:      Rate and Rhythm: Normal rate  Pulses: Normal pulses  Heart sounds: Normal heart sounds  Pulmonary:      Effort: Pulmonary effort is normal       Breath sounds: Normal breath sounds  Abdominal:      General: Abdomen is flat  Bowel sounds are normal       Palpations: Abdomen is soft  Comments: ruq pain    Musculoskeletal:         General: Normal range of motion  Cervical back: Normal range of motion and neck supple  Skin:     General: Skin is warm  Capillary Refill: Capillary refill takes less than 2 seconds  Neurological:      General: No focal deficit present  Mental Status: He is alert and oriented to person, place, and time     Psychiatric:         Mood and Affect: Mood normal          Behavior: Behavior normal

## 2021-10-05 DIAGNOSIS — J30.1 SEASONAL ALLERGIC RHINITIS DUE TO POLLEN: ICD-10-CM

## 2021-10-05 RX ORDER — MONTELUKAST SODIUM 10 MG/1
TABLET ORAL
Qty: 90 TABLET | Refills: 2 | Status: SHIPPED | OUTPATIENT
Start: 2021-10-05

## 2021-10-11 ENCOUNTER — TELEPHONE (OUTPATIENT)
Dept: FAMILY MEDICINE CLINIC | Facility: CLINIC | Age: 41
End: 2021-10-11

## 2021-10-14 ENCOUNTER — APPOINTMENT (OUTPATIENT)
Dept: LAB | Age: 41
End: 2021-10-14

## 2021-10-14 DIAGNOSIS — J30.1 SEASONAL ALLERGIC RHINITIS DUE TO POLLEN: ICD-10-CM

## 2021-10-14 DIAGNOSIS — I10 ESSENTIAL HYPERTENSION: ICD-10-CM

## 2021-10-14 DIAGNOSIS — J32.9 CHRONIC CONGESTION OF PARANASAL SINUS: ICD-10-CM

## 2021-10-14 DIAGNOSIS — E11.65 TYPE 2 DIABETES MELLITUS WITH HYPERGLYCEMIA, WITHOUT LONG-TERM CURRENT USE OF INSULIN (HCC): ICD-10-CM

## 2021-10-14 DIAGNOSIS — J32.9 RECURRENT SINUS INFECTIONS: ICD-10-CM

## 2021-10-14 DIAGNOSIS — R09.81 NASAL CONGESTION: ICD-10-CM

## 2021-10-14 DIAGNOSIS — J34.89 SINUS PRESSURE: ICD-10-CM

## 2021-10-14 LAB
ANION GAP SERPL CALCULATED.3IONS-SCNC: 2 MMOL/L (ref 4–13)
BUN SERPL-MCNC: 14 MG/DL (ref 5–25)
CALCIUM SERPL-MCNC: 9.7 MG/DL (ref 8.3–10.1)
CHLORIDE SERPL-SCNC: 104 MMOL/L (ref 100–108)
CO2 SERPL-SCNC: 30 MMOL/L (ref 21–32)
CREAT SERPL-MCNC: 0.81 MG/DL (ref 0.6–1.3)
GFR SERPL CREATININE-BSD FRML MDRD: 110 ML/MIN/1.73SQ M
GLUCOSE P FAST SERPL-MCNC: 116 MG/DL (ref 65–99)
POTASSIUM SERPL-SCNC: 4.4 MMOL/L (ref 3.5–5.3)
SODIUM SERPL-SCNC: 136 MMOL/L (ref 136–145)

## 2021-10-14 PROCEDURE — 80048 BASIC METABOLIC PNL TOTAL CA: CPT

## 2021-10-14 PROCEDURE — 82785 ASSAY OF IGE: CPT

## 2021-10-14 PROCEDURE — 86003 ALLG SPEC IGE CRUDE XTRC EA: CPT

## 2021-10-14 PROCEDURE — 36415 COLL VENOUS BLD VENIPUNCTURE: CPT

## 2021-10-15 LAB
A ALTERNATA IGE QN: <0.1 KUA/I
A FUMIGATUS IGE QN: <0.1 KUA/I
ALLERGEN COMMENT: NORMAL
BERMUDA GRASS IGE QN: <0.1 KUA/I
BOXELDER IGE QN: <0.1 KUA/I
C HERBARUM IGE QN: <0.1 KUA/I
CAT DANDER IGE QN: <0.1 KUA/I
CMN PIGWEED IGE QN: <0.1 KUA/I
COMMON RAGWEED IGE QN: <0.1 KUA/I
COTTONWOOD IGE QN: <0.1 KUA/I
D FARINAE IGE QN: <0.1 KUA/I
D PTERONYSS IGE QN: <0.1 KUA/I
DOG DANDER IGE QN: <0.1 KUA/I
LONDON PLANE IGE QN: <0.1 KUA/I
MOUSE URINE PROT IGE QN: <0.1 KUA/I
MT JUNIPER IGE QN: <0.1 KUA/I
MUGWORT IGE QN: <0.1 KUA/I
P NOTATUM IGE QN: <0.1 KUA/I
ROACH IGE QN: <0.1 KUA/I
SHEEP SORREL IGE QN: <0.1 KUA/I
SILVER BIRCH IGE QN: <0.1 KUA/I
TIMOTHY IGE QN: <0.1 KUA/I
TOTAL IGE SMQN RAST: 10.1 KU/L (ref 0–113)
WALNUT IGE QN: <0.1 KUA/I
WHITE ASH IGE QN: <0.1 KUA/I
WHITE ELM IGE QN: <0.1 KUA/I
WHITE MULBERRY IGE QN: <0.1 KUA/I
WHITE OAK IGE QN: <0.1 KUA/I

## 2021-10-16 ENCOUNTER — OFFICE VISIT (OUTPATIENT)
Dept: FAMILY MEDICINE CLINIC | Facility: CLINIC | Age: 41
End: 2021-10-16
Payer: COMMERCIAL

## 2021-10-16 VITALS
DIASTOLIC BLOOD PRESSURE: 78 MMHG | RESPIRATION RATE: 14 BRPM | WEIGHT: 262.4 LBS | HEART RATE: 90 BPM | BODY MASS INDEX: 32.63 KG/M2 | OXYGEN SATURATION: 97 % | TEMPERATURE: 97.3 F | HEIGHT: 75 IN | SYSTOLIC BLOOD PRESSURE: 122 MMHG

## 2021-10-16 DIAGNOSIS — F41.8 ANXIETY ASSOCIATED WITH DEPRESSION: ICD-10-CM

## 2021-10-16 DIAGNOSIS — E11.65 TYPE 2 DIABETES MELLITUS WITH HYPERGLYCEMIA, WITHOUT LONG-TERM CURRENT USE OF INSULIN (HCC): ICD-10-CM

## 2021-10-16 DIAGNOSIS — Z00.00 WELL ADULT EXAM: Primary | ICD-10-CM

## 2021-10-16 DIAGNOSIS — E78.5 DYSLIPIDEMIA: ICD-10-CM

## 2021-10-16 DIAGNOSIS — I10 ESSENTIAL HYPERTENSION: ICD-10-CM

## 2021-10-16 DIAGNOSIS — E66.9 OBESITY (BMI 30.0-34.9): ICD-10-CM

## 2021-10-16 PROBLEM — E66.811 OBESITY (BMI 30.0-34.9): Status: ACTIVE | Noted: 2021-10-16

## 2021-10-16 PROCEDURE — 3078F DIAST BP <80 MM HG: CPT | Performed by: FAMILY MEDICINE

## 2021-10-16 PROCEDURE — 3008F BODY MASS INDEX DOCD: CPT | Performed by: FAMILY MEDICINE

## 2021-10-16 PROCEDURE — 1036F TOBACCO NON-USER: CPT | Performed by: FAMILY MEDICINE

## 2021-10-16 PROCEDURE — 3074F SYST BP LT 130 MM HG: CPT | Performed by: FAMILY MEDICINE

## 2021-10-16 PROCEDURE — 99396 PREV VISIT EST AGE 40-64: CPT | Performed by: FAMILY MEDICINE

## 2021-11-09 PROCEDURE — 4010F ACE/ARB THERAPY RXD/TAKEN: CPT | Performed by: FAMILY MEDICINE

## 2021-12-03 ENCOUNTER — OFFICE VISIT (OUTPATIENT)
Dept: URGENT CARE | Age: 41
End: 2021-12-03
Payer: COMMERCIAL

## 2021-12-03 VITALS
TEMPERATURE: 98.6 F | WEIGHT: 262 LBS | RESPIRATION RATE: 22 BRPM | OXYGEN SATURATION: 100 % | HEIGHT: 75 IN | HEART RATE: 88 BPM | BODY MASS INDEX: 32.58 KG/M2

## 2021-12-03 DIAGNOSIS — J01.90 ACUTE SINUSITIS, RECURRENCE NOT SPECIFIED, UNSPECIFIED LOCATION: ICD-10-CM

## 2021-12-03 DIAGNOSIS — Z11.59 SPECIAL SCREENING EXAMINATION FOR VIRAL DISEASE: Primary | ICD-10-CM

## 2021-12-03 PROCEDURE — 0241U HB NFCT DS VIR RESP RNA 4 TRGT: CPT | Performed by: PHYSICIAN ASSISTANT

## 2021-12-03 PROCEDURE — 99213 OFFICE O/P EST LOW 20 MIN: CPT | Performed by: PHYSICIAN ASSISTANT

## 2021-12-03 RX ORDER — AMOXICILLIN AND CLAVULANATE POTASSIUM 875; 125 MG/1; MG/1
1 TABLET, FILM COATED ORAL EVERY 12 HOURS SCHEDULED
Qty: 14 TABLET | Refills: 0 | Status: SHIPPED | OUTPATIENT
Start: 2021-12-03 | End: 2021-12-10

## 2021-12-04 LAB
FLUAV RNA RESP QL NAA+PROBE: NEGATIVE
FLUBV RNA RESP QL NAA+PROBE: NEGATIVE
RSV RNA RESP QL NAA+PROBE: NEGATIVE
SARS-COV-2 RNA RESP QL NAA+PROBE: NEGATIVE

## 2021-12-27 ENCOUNTER — OFFICE VISIT (OUTPATIENT)
Dept: URGENT CARE | Facility: CLINIC | Age: 41
End: 2021-12-27
Payer: COMMERCIAL

## 2021-12-27 VITALS
HEART RATE: 114 BPM | WEIGHT: 262 LBS | RESPIRATION RATE: 16 BRPM | HEIGHT: 75 IN | TEMPERATURE: 100.8 F | BODY MASS INDEX: 32.58 KG/M2 | OXYGEN SATURATION: 97 %

## 2021-12-27 DIAGNOSIS — R05.9 COUGH: Primary | ICD-10-CM

## 2021-12-27 PROCEDURE — 87636 SARSCOV2 & INF A&B AMP PRB: CPT | Performed by: NURSE PRACTITIONER

## 2021-12-27 PROCEDURE — 99213 OFFICE O/P EST LOW 20 MIN: CPT | Performed by: NURSE PRACTITIONER

## 2021-12-30 ENCOUNTER — TELEMEDICINE (OUTPATIENT)
Dept: FAMILY MEDICINE CLINIC | Facility: CLINIC | Age: 41
End: 2021-12-30
Payer: COMMERCIAL

## 2021-12-30 DIAGNOSIS — U07.1 COVID-19 VIRUS INFECTION: Primary | ICD-10-CM

## 2021-12-30 LAB
FLUAV RNA RESP QL NAA+PROBE: NEGATIVE
FLUBV RNA RESP QL NAA+PROBE: NEGATIVE
SARS-COV-2 RNA RESP QL NAA+PROBE: POSITIVE

## 2021-12-30 PROCEDURE — 99213 OFFICE O/P EST LOW 20 MIN: CPT | Performed by: FAMILY MEDICINE

## 2022-02-13 PROBLEM — M54.50 ACUTE LEFT-SIDED LOW BACK PAIN WITHOUT SCIATICA: Status: RESOLVED | Noted: 2020-06-29 | Resolved: 2022-02-13

## 2022-02-13 PROBLEM — E66.811 OBESITY (BMI 30.0-34.9): Status: RESOLVED | Noted: 2021-10-16 | Resolved: 2022-02-13

## 2022-02-13 PROBLEM — R73.9 HYPERGLYCEMIA: Status: RESOLVED | Noted: 2021-09-17 | Resolved: 2022-02-13

## 2022-02-13 PROBLEM — E66.9 OBESITY (BMI 30.0-34.9): Status: RESOLVED | Noted: 2021-10-16 | Resolved: 2022-02-13

## 2022-02-13 PROBLEM — U07.1 COVID-19 VIRUS INFECTION: Status: RESOLVED | Noted: 2021-12-30 | Resolved: 2022-02-13

## 2022-05-11 ENCOUNTER — OFFICE VISIT (OUTPATIENT)
Dept: URGENT CARE | Age: 42
End: 2022-05-11
Payer: COMMERCIAL

## 2022-05-11 VITALS
SYSTOLIC BLOOD PRESSURE: 132 MMHG | BODY MASS INDEX: 33.32 KG/M2 | DIASTOLIC BLOOD PRESSURE: 70 MMHG | HEART RATE: 83 BPM | TEMPERATURE: 96.9 F | HEIGHT: 75 IN | WEIGHT: 268 LBS | OXYGEN SATURATION: 98 % | RESPIRATION RATE: 20 BRPM

## 2022-05-11 DIAGNOSIS — J45.20 MILD INTERMITTENT ASTHMA WITHOUT COMPLICATION: ICD-10-CM

## 2022-05-11 DIAGNOSIS — J32.9 RECURRENT SINUS INFECTIONS: Primary | ICD-10-CM

## 2022-05-11 PROCEDURE — 99213 OFFICE O/P EST LOW 20 MIN: CPT

## 2022-05-11 RX ORDER — AMOXICILLIN 500 MG/1
500 CAPSULE ORAL EVERY 12 HOURS SCHEDULED
Qty: 14 CAPSULE | Refills: 0 | Status: SHIPPED | OUTPATIENT
Start: 2022-05-11 | End: 2022-05-18

## 2022-05-11 RX ORDER — METHYLPREDNISOLONE 4 MG/1
TABLET ORAL
Qty: 1 EACH | Refills: 0 | Status: SHIPPED | OUTPATIENT
Start: 2022-05-11 | End: 2022-07-28 | Stop reason: SDUPTHER

## 2022-05-11 RX ORDER — BENZONATATE 200 MG/1
200 CAPSULE ORAL 3 TIMES DAILY PRN
Qty: 20 CAPSULE | Refills: 0 | Status: SHIPPED | OUTPATIENT
Start: 2022-05-11

## 2022-05-11 NOTE — LETTER
May 11, 2022     Patient: Louann Andrade   YOB: 1980   Date of Visit: 5/11/2022       To Whom it May Concern:    Louann Andrade was seen in my clinic on 5/11/2022  He may return to work on 5/12/2022       If you have any questions or concerns, please don't hesitate to call           Sincerely,          DANDRE REDD        CC: No Recipients

## 2022-05-11 NOTE — PROGRESS NOTES
[facility]        NAME: Ulysses Dimes is a 36 y o  male  : 1980    MRN: [id number]  DATE: May 13, 2022  TIME: 12:33 PM    Assessment and Plan   Recurrent sinus infections [J32 9]  1  Recurrent sinus infections  amoxicillin (AMOXIL) 500 mg capsule   2  Mild intermittent asthma without complication  methylPREDNISolone 4 MG tablet therapy pack    benzonatate (TESSALON) 200 MG capsule   Patient states he has seasonal allergies and recurrent sinusitis  Has been taking allegra without relief  Today had a coughing spell and has sinus pain, pressure and congestion  Reports fevers, has had recurrent sinusitis and has been seen by ENT  Awaiting CT  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  Chest tightness at times  Feels like his airway is more constricted  Assessment noted cough, rhonchi and wheezing  Enlarged cervical lymph nodes  Will order amoxicillin, tessalon and a steroid pack  States he has an inhaler at home  F/U with PCP as needed  Go to ER if worsening  Patient Instructions     Take medications as prescribed  Follow up with PCP as needed  Proceed to  ER if symptoms worsen  Chief Complaint     Chief Complaint   Patient presents with    Cold Like Symptoms     Pt started ten days ago with allergy symptoms, sinus pain/pressure, headache, sore throat and worsening asthma  Pt using inhaler 3 times daily  History of Present Illness       Patient states he has seasonal allergies and recurrent sinusitis  Has been taking allegra without relief  Today had a coughing spell and has sinus pain, pressure and congestion  Reports fevers, has had recurrent sinusitis and has been seen by ENT  Awaiting CT  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  Chest tightness at times  Feels like his airway is more constricted  Review of Systems   Review of Systems   Constitutional: Positive for fatigue and fever  Negative for chills     HENT: Positive for congestion and postnasal drip  Negative for ear discharge, ear pain, sinus pain and sore throat  Eyes: Negative for pain and discharge  Respiratory: Positive for cough, chest tightness, shortness of breath and wheezing  Cardiovascular: Negative for chest pain and palpitations  Gastrointestinal: Negative for abdominal pain, diarrhea, nausea and vomiting  Genitourinary: Negative for difficulty urinating and dysuria  Musculoskeletal: Negative for arthralgias and myalgias  Skin: Negative for rash  Neurological: Negative for dizziness, syncope, light-headedness, numbness and headaches  Psychiatric/Behavioral: Negative for agitation  All other systems reviewed and are negative  Current Medications       Current Outpatient Medications:     albuterol (ProAir HFA) 90 mcg/act inhaler, Inhale 2 puffs every 6 (six) hours as needed for shortness of breath, Disp: 8 5 g, Rfl: 0    ALPRAZolam (XANAX) 0 25 mg tablet, Take 1 tablet (0 25 mg total) by mouth 2 (two) times a day as needed for anxiety, Disp: 60 tablet, Rfl: 3    amoxicillin (AMOXIL) 500 mg capsule, Take 1 capsule (500 mg total) by mouth every 12 (twelve) hours for 7 days, Disp: 14 capsule, Rfl: 0    ascorbic acid (VITAMIN C) 500 MG tablet, Take 500 mg by mouth daily, Disp: , Rfl:     benazepril (LOTENSIN) 10 mg tablet, Take 1 tablet (10 mg total) by mouth daily, Disp: 90 tablet, Rfl: 2    benzonatate (TESSALON) 200 MG capsule, Take 1 capsule (200 mg total) by mouth as needed in the morning and 1 capsule (200 mg total) as needed at noon and 1 capsule (200 mg total) as needed in the evening for cough  , Disp: 20 capsule, Rfl: 0    Blood Glucose Monitoring Suppl (ONE TOUCH ULTRA 2) w/Device KIT, Test blood sugar twice daily, Disp: 1 kit, Rfl: 0    cetirizine (ZyrTEC) 10 mg tablet, Take 1 tablet (10 mg total) by mouth daily, Disp: 30 tablet, Rfl: 5    glucose blood (OneTouch Ultra) test strip, Test blood sugar twice daily, Disp: 200 each, Rfl: 3   Lancets (OneTouch Delica Plus [id number]) MISC, Test blood sugar twice daily, Disp: 200 each, Rfl: 3    metFORMIN (GLUCOPHAGE) 500 mg tablet, TAKE 1 TABLET BY MOUTH TWICE A DAY WITH MEALS, Disp: 180 tablet, Rfl: 2    methylPREDNISolone 4 MG tablet therapy pack, Use as directed on package, Disp: 1 each, Rfl: 0    montelukast (SINGULAIR) 10 mg tablet, TAKE 1 TABLET BY MOUTH DAILY AT BEDTIME, Disp: 90 tablet, Rfl: 2    multivitamin (THERAGRAN) TABS, Take 1 tablet by mouth daily  , Disp: , Rfl:     clindamycin (CLEOCIN) 150 mg capsule, , Disp: , Rfl:     fluticasone (FLONASE) 50 mcg/act nasal spray, 2 sprays into each nostril daily (Patient not taking: No sig reported), Disp: 1 Bottle, Rfl: 1    Current Allergies     Allergies as of 05/11/2022 - Reviewed 05/11/2022   Allergen Reaction Noted    Chocolate - food allergy Sneezing 11/28/2017            The following portions of the patient's history were reviewed and updated as appropriate: allergies, current medications, past family history, past medical history, past social history, past surgical history and problem list      Past Medical History:   Diagnosis Date    Allergic     Anxiety     Arthritis     Asthma     Diabetes ([address] )     GERD (gastroesophageal reflux disease)     Hypertension     Obesity        Past Surgical History:   Procedure Laterality Date    MYRINGOTOMY W/ TUBES         Family History   Problem Relation Age of Onset    Diabetes Mother     Hypertension Mother     Alcohol abuse Father          Medications have been verified  Objective   /70   Pulse 83   Temp (!) 96 9 °F (36 1 °C)   Resp 20   Ht 6' 3" (1 905 m)   Wt 122 kg (268 lb)   SpO2 98%   BMI 33 50 kg/m²   No LMP for male patient  Physical Exam     Physical Exam  Vitals reviewed  Constitutional:       General: He is not in acute distress  Appearance: Normal appearance  He is not ill-appearing  HENT:      Head: Normocephalic and atraumatic  Right Ear: Tympanic membrane and ear canal normal  No middle ear effusion  Left Ear: Tympanic membrane and ear canal normal   No middle ear effusion  Nose: Congestion and rhinorrhea present  Mouth/Throat:      Mouth: Mucous membranes are moist       Pharynx: No oropharyngeal exudate or posterior oropharyngeal erythema  Tonsils: No tonsillar exudate  Eyes:      Extraocular Movements: Extraocular movements intact  Conjunctiva/sclera: Conjunctivae normal       Pupils: Pupils are equal, round, and reactive to light  Cardiovascular:      Rate and Rhythm: Normal rate and regular rhythm  Pulses: Normal pulses  Heart sounds: Normal heart sounds  No murmur heard  Pulmonary:      Effort: Pulmonary effort is normal  No respiratory distress  Breath sounds: Examination of the right-upper field reveals rhonchi  Examination of the left-upper field reveals rhonchi  Wheezing and rhonchi present  Chest:      Chest wall: Tenderness present  Abdominal:      General: Bowel sounds are normal  There is no distension  Palpations: Abdomen is soft  Tenderness: There is no abdominal tenderness  There is no guarding  Musculoskeletal:      Cervical back: Normal range of motion and neck supple  No tenderness  Lymphadenopathy:      Cervical: Cervical adenopathy present  Skin:     General: Skin is warm  Neurological:      General: No focal deficit present  Mental Status: He is alert     Psychiatric:         Mood and Affect: Mood normal          Behavior: Behavior normal          Judgment: Judgment normal

## 2022-06-17 ENCOUNTER — APPOINTMENT (OUTPATIENT)
Dept: URGENT CARE | Age: 42
End: 2022-06-17
Payer: OTHER MISCELLANEOUS

## 2022-06-17 PROCEDURE — 99283 EMERGENCY DEPT VISIT LOW MDM: CPT | Performed by: PHYSICIAN ASSISTANT

## 2022-06-17 PROCEDURE — G0382 LEV 3 HOSP TYPE B ED VISIT: HCPCS | Performed by: PHYSICIAN ASSISTANT

## 2022-06-20 ENCOUNTER — APPOINTMENT (OUTPATIENT)
Dept: RADIOLOGY | Age: 42
End: 2022-06-20
Payer: COMMERCIAL

## 2022-06-20 ENCOUNTER — OCCMED (OUTPATIENT)
Dept: URGENT CARE | Age: 42
End: 2022-06-20
Payer: OTHER MISCELLANEOUS

## 2022-06-20 DIAGNOSIS — S16.1XXD CERVICAL STRAIN, ACUTE, SUBSEQUENT ENCOUNTER: ICD-10-CM

## 2022-06-20 DIAGNOSIS — S16.1XXD CERVICAL STRAIN, ACUTE, SUBSEQUENT ENCOUNTER: Primary | ICD-10-CM

## 2022-06-20 PROCEDURE — 72040 X-RAY EXAM NECK SPINE 2-3 VW: CPT

## 2022-06-20 PROCEDURE — 99213 OFFICE O/P EST LOW 20 MIN: CPT | Performed by: PHYSICIAN ASSISTANT

## 2022-06-23 ENCOUNTER — APPOINTMENT (OUTPATIENT)
Dept: URGENT CARE | Age: 42
End: 2022-06-23
Payer: OTHER MISCELLANEOUS

## 2022-06-23 PROCEDURE — 99214 OFFICE O/P EST MOD 30 MIN: CPT | Performed by: PHYSICIAN ASSISTANT

## 2022-06-27 ENCOUNTER — APPOINTMENT (OUTPATIENT)
Dept: URGENT CARE | Age: 42
End: 2022-06-27
Payer: OTHER MISCELLANEOUS

## 2022-06-27 PROCEDURE — 99213 OFFICE O/P EST LOW 20 MIN: CPT | Performed by: PHYSICIAN ASSISTANT

## 2022-07-05 ENCOUNTER — APPOINTMENT (OUTPATIENT)
Dept: URGENT CARE | Facility: CLINIC | Age: 42
End: 2022-07-05
Payer: OTHER MISCELLANEOUS

## 2022-07-05 PROCEDURE — 99213 OFFICE O/P EST LOW 20 MIN: CPT | Performed by: FAMILY MEDICINE

## 2022-07-28 ENCOUNTER — TELEMEDICINE (OUTPATIENT)
Dept: FAMILY MEDICINE CLINIC | Facility: CLINIC | Age: 42
End: 2022-07-28
Payer: COMMERCIAL

## 2022-07-28 DIAGNOSIS — J40 BRONCHITIS: Primary | ICD-10-CM

## 2022-07-28 PROCEDURE — 99213 OFFICE O/P EST LOW 20 MIN: CPT | Performed by: FAMILY MEDICINE

## 2022-07-28 RX ORDER — AZITHROMYCIN 250 MG/1
TABLET, FILM COATED ORAL
Qty: 6 TABLET | Refills: 0 | Status: SHIPPED | OUTPATIENT
Start: 2022-07-28 | End: 2022-08-01

## 2022-07-28 RX ORDER — METHYLPREDNISOLONE 4 MG/1
TABLET ORAL
Qty: 1 EACH | Refills: 0 | Status: SHIPPED | OUTPATIENT
Start: 2022-07-28

## 2022-07-28 NOTE — PROGRESS NOTES
Virtual Regular Visit    Verification of patient location:    Patient is located in the following state in which I hold an active license [place]      Assessment/Plan:    Problem List Items Addressed This Visit    None     Visit Diagnoses     Bronchitis    -  Primary    Relevant Medications    azithromycin (ZITHROMAX) 250 mg tablet    methylPREDNISolone 4 MG tablet therapy pack        A lot of fluids and rest  Tylenol or motrin for fever or pain  Give zpack  Side effects educated patient  Give medrol dose pack  Side effects educated patient  Call office if symptoms no improving or worse  Reason for visit is   Chief Complaint   Patient presents with    Flu Symptoms     Flu/cold like symptoms  Possible seasonal allergies   Virtual Regular Visit      Health Maintenance   Topic Date Due    Hepatitis C Screening  Never done    COVID-19 Vaccine (1) Never done    Pneumococcal Vaccine: Pediatrics (0 to 5 Years) and At-Risk Patients (6 to 59 Years) (1 - PCV) Never done    DM Eye Exam  Never done    HIV Screening  Never done    DTaP,Tdap,and Td Vaccines (1 - Tdap) Never done    PT PLAN OF CARE  09/17/2020    HEMOGLOBIN A1C  03/18/2022    BMI: Followup Plan  08/15/2022    Influenza Vaccine (1) 09/01/2022    Annual Physical  10/16/2022    Diabetic Foot Exam  10/16/2022    BMI: Adult  05/11/2023    HIB Vaccine  Aged Out    Hepatitis B Vaccine  Aged Out    IPV Vaccine  Aged Out    Hepatitis A Vaccine  Aged Out    Meningococcal ACWY Vaccine  Aged Out    HPV Vaccine  Aged Out     Encounter provider Katy Leung MD    Provider located at [address] [place] [phone]      Recent Visits  No visits were found meeting these conditions    Showing recent visits within past 7 days and meeting all other requirements  Today's Visits  Date Type Provider Dept   07/28/22 [address] [place] [address] [address] [facility]   Showing today's visits and meeting all other requirements  Future Appointments  No visits were found meeting these conditions  Showing future appointments within next 150 days and meeting all other requirements     It was my intent to perform this visit via video technology but the patient was not able to do a video connection so the visit was completed via audio telephone only  The patient was identified by name and date of birth  Kofi Brar was informed that this is a telemedicine visit and that the visit is being conducted through Telephone  My office door was closed  No one else was in the room  He acknowledged consent and understanding of privacy and security of the video platform  The patient has agreed to participate and understands they can discontinue the visit at any time  Patient is aware this is a billable service  Subjective  Kofi Brar is a 36 y o  male for URI symptoms   HPI     C/o Sore throat and cough for 6 days  Feels body ache today  Running nose  No fever  Denies SoB, CP, n/v/abd pain  He has albuterol and had to use it today  No smoker  Denies contact with Covid19 pt           Past Medical History:   Diagnosis Date    Allergic     Anxiety     Arthritis     Asthma     Diabetes [address] [address]     GERD (gastroesophageal reflux disease)     Hypertension     Obesity        Past Surgical History:   Procedure Laterality Date    MYRINGOTOMY W/ TUBES         Current Outpatient Medications   Medication Sig Dispense Refill    azithromycin (ZITHROMAX) 250 mg tablet Take 2 tabs on day 1, then take 1 tab daily from day 2-day 5  6 tablet 0    methylPREDNISolone 4 MG tablet therapy pack Use as directed on package 1 each 0    albuterol (ProAir HFA) 90 mcg/act inhaler Inhale 2 puffs every 6 (six) hours as needed for shortness of breath 8 5 g 0    ALPRAZolam (XANAX) 0 25 mg tablet Take 1 tablet (0 25 mg total) by mouth 2 (two) times a day as needed for anxiety 60 tablet 3    ascorbic acid (VITAMIN C) 500 MG tablet Take 500 mg by mouth daily      benazepril (LOTENSIN) 10 mg tablet Take 1 tablet (10 mg total) by mouth daily 90 tablet 2    benzonatate (TESSALON) 200 MG capsule Take 1 capsule (200 mg total) by mouth as needed in the morning and 1 capsule (200 mg total) as needed at noon and 1 capsule (200 mg total) as needed in the evening for cough  20 capsule 0    Blood Glucose Monitoring Suppl (ONE TOUCH ULTRA 2) w/Device KIT Test blood sugar twice daily 1 kit 0    cetirizine (ZyrTEC) 10 mg tablet Take 1 tablet (10 mg total) by mouth daily 30 tablet 5    clindamycin (CLEOCIN) 150 mg capsule  (Patient not taking: No sig reported)      fluticasone (FLONASE) 50 mcg/act nasal spray 2 sprays into each nostril daily (Patient not taking: No sig reported) 1 Bottle 1    glucose blood (OneTouch Ultra) test strip Test blood sugar twice daily 200 each 3    Lancets (OneTouch Delica Plus [id number]) MISC Test blood sugar twice daily 200 each 3    metFORMIN (GLUCOPHAGE) 500 mg tablet TAKE 1 TABLET BY MOUTH TWICE A DAY WITH MEALS 180 tablet 2    montelukast (SINGULAIR) 10 mg tablet TAKE 1 TABLET BY MOUTH DAILY AT BEDTIME 90 tablet 2    multivitamin (THERAGRAN) TABS Take 1 tablet by mouth daily         No current facility-administered medications for this visit  Allergies   Allergen Reactions    Chocolate - Food Allergy Sneezing       Review of Systems   Constitutional: Negative for appetite change, chills and fever  HENT: Positive for rhinorrhea and sore throat  Negative for congestion, ear pain and sinus pain  Eyes: Negative for discharge and itching  Respiratory: Positive for cough  Negative for apnea, chest tightness, shortness of breath and wheezing  Cardiovascular: Negative for chest pain, palpitations and leg swelling  Gastrointestinal: Negative for abdominal pain, anal bleeding, constipation, diarrhea, nausea and vomiting     Endocrine: Negative for cold intolerance, heat intolerance and polyuria  Genitourinary: Negative for difficulty urinating and dysuria  Musculoskeletal: Negative for arthralgias, back pain and myalgias  Skin: Negative for rash  Neurological: Negative for dizziness and headaches  Psychiatric/Behavioral: Negative for agitation  Video Exam    There were no vitals filed for this visit  Physical Exam     I spent 15 minutes directly with the patient during this visit    VIRTUAL VISIT DISCLAIMER      Alexander Servin verbally agrees to participate in [employer]  Pt is aware that [employer] could be limited without vital signs or the ability to perform a full hands-on physical Jovan Hinkle understands he or the provider may request at any time to terminate the video visit and request the patient to seek care or treatment in person

## 2022-08-02 ENCOUNTER — EVALUATION (OUTPATIENT)
Dept: PHYSICAL THERAPY | Facility: REHABILITATION | Age: 42
End: 2022-08-02
Payer: COMMERCIAL

## 2022-08-02 DIAGNOSIS — M25.512 ACUTE PAIN OF LEFT SHOULDER: ICD-10-CM

## 2022-08-02 DIAGNOSIS — M54.12 CERVICAL RADICULOPATHY: Primary | ICD-10-CM

## 2022-08-02 PROCEDURE — 97110 THERAPEUTIC EXERCISES: CPT | Performed by: PHYSICAL THERAPIST

## 2022-08-02 PROCEDURE — 97162 PT EVAL MOD COMPLEX 30 MIN: CPT | Performed by: PHYSICAL THERAPIST

## 2022-08-02 NOTE — PROGRESS NOTES
PT Evaluation     Today's date: 2022  Patient name: Krishna Ball  : 1980  MRN: [id number]  Referring provider: Maddy Gonzalez MD  Dx:   Encounter Diagnosis     ICD-10-CM    1  Cervical radiculopathy  M54 12    2  Acute pain of left shoulder  M25 512                   Assessment  Assessment details: Pt is a 36year old right-handed male presenting to PT with 6 week history of left cervical and shoulder pain due to work-related injury  Pt presents with cervical and left shoulder pain, impaired posture, decreased cervical and left shoulder range of motion, decreased deep cervical and left shoulder strength, impaired ability to perform ADLs with left upper extremity and decreased tolerance to activity  Additionally, pt has sleep disturbance secondary to his pain and is currently unable to return to work due to his current functional deficits  Pt is a good candidate for outpatient physical therapy and would benefit from skilled physical therapy to address limitations and to achieve goals  Thank you for this referral    Impairments: abnormal coordination, abnormal or restricted ROM, activity intolerance, impaired physical strength and pain with function  Understanding of Dx/Px/POC: good   Prognosis: good    Goals  ST  Patient will report 25% decrease in pain in 4 weeks  2  Patient will demonstrate 25% improvement in ROM in 4 weeks  3  Patient will demonstrate 1/2 grade improvement in strength in 4 weeks  LT  Patient will be able to perform IADLS without restriction or pain by discharge  2  Patient will be independent in HEP by discharge  3  Patient will be able to return to recreational/work duties without restriction or pain by discharge        Plan  Patient would benefit from: PT eval and skilled PT  Planned modality interventions: cryotherapy and thermotherapy: hydrocollator packs  Planned therapy interventions: IADL retraining, body mechanics training, flexibility, functional ROM exercises, home exercise program, neuromuscular re-education, manual therapy, postural training, strengthening, stretching, therapeutic activities, therapeutic exercise and joint mobilization  Frequency: 2x week  Duration in visits: 8  Duration in weeks: 4  Treatment plan discussed with: patient        Subjective Evaluation    History of Present Illness  Date of onset: 6/17/2022  Mechanism of injury: Pt is a 36year old right-handed male presenting to PT approximately 6 weeks s/p injury at work  Pt reports around 11am he was using a  high-reach machine, when his machine was accidentally struck by another machine operated by another person  Pt reports he caught himself with left hand and felt a "whiplash-type" pain  Pt reports he went to look up a little bit later and felt a jolt of pain in left-side of his neck which then radiated down his left arm  Pt reported his injury, and was issued ice packs and Motrin by his   Pt spoke to a nurse and was referred to [facility] for further evaluation  Pt reports he went on 6/17 and was evaluated, prescribed a muscle relaxer, and then scheduled for follow-up on 6/20  Pt reports he was placed on restricted-duty, and returned to work on 6/21  Pt reports his pain became so severe he went to ER on 6/23  Pt reports x-rays performed, results as follows: The vertebral body heights including the intervertebral disc spaces are relatively maintained with no evidence for acute fracture or spondylolisthesis  Tiny degenerative endplate osteophytes seen at C5-C6  Pt reports he was prescribed Motrin and an increased dosage with muscle relaxant  Pt reports he returned to work on 6/24, then discontinued working on 6/29 when his work restriction was changed to sedentary and his employer said they could not accommodate him  Pt reports he applied for Workmen's Compensation, however, found out he was denied on 7/5/22  Pt was referred to OPPT      Pain Location: cervical spine, radiating into left shoulder and upper arm    Pain Type: stabbing, pressure ("someone sitting on shoulder"    Pain Intensity:  Current: 8  Best: 7  Worst: 10      Pt reports increased pain and/or difficulty with: driving, lifting left arm above head, looking up/down/side-side, lifting objects with both hands, showering, ADLs  Pt reports sleep disturbance secondary to pain waking 2 times/night  Pt reports decreased pain with: heating pad, medication, Voltaren gel            Not a recurrent problem   Quality of life: good      Diagnostic Tests  X-ray: normal  Treatments  Current treatment: medication  Patient Goals  Patient goals for therapy: decreased pain, increased motion, increased strength, independence with ADLs/IADLs and return to work          Objective     Postural Observations    Additional Postural Observation Details  Moderate forward head, bilateral protracted and IR shoulders, moderately increased thoracic kyphosis  Tenderness     Additional Tenderness Details  TTP L 1st rib, UT, LS, scalenes and SCM    Neurological Testing     Sensation   Cervical/Thoracic   Left   Diminished: light touch    Right   Intact: light touch    Comments   Left light touch: left clavicular region  Reflexes   Left   Biceps (C5/C6): normal (2+)  Triceps (C7): normal (2+)    Right   Biceps (C5/C6): normal (2+)  Triceps (C7): normal (2+)    Active Range of Motion   Cervical/Thoracic Spine       Cervical    Flexion: 46 degrees  with pain  Extension: 19 degrees     with pain  Left lateral flexion: 34 degrees     with pain  Right lateral flexion: 31 degrees     with pain  Left rotation: 64 degrees with pain  Right rotation: 60 degrees    with pain  Left Shoulder   Flexion: 120 degrees with pain  Abduction: 98 degrees with pain  External rotation BTH: Active external rotation behind the head: left ear w/compensatory cervical flexion   with pain  Internal rotation BTB: L1 with pain    Right Shoulder   Flexion: 155 degrees   Abduction: 155 degrees   External rotation BTH: T1   Internal rotation BTB: T10     Additional Active Range of Motion Details  Pt reports increased localized left cervical pain with extension > right lateral flexion, with general increased soreness with all planes of motion  Joint Play   Joints within functional limits: C1, C2, C3, C4 and C5     Hypomobile: C6, C7, T1, T2, T3, T4, T5 and T6     Pain: C3, C4, C5, C6 and C7     Strength/Myotome Testing     Left Shoulder     Planes of Motion   Flexion: 3-   Abduction: 3-   External rotation at 0°: 4-   Internal rotation at 0°: 4     Isolated Muscles   Lower trapezius: 3-   Middle trapezius: 3-     Right Shoulder     Planes of Motion   Flexion: 5   Abduction: 5   External rotation at 0°: 5   Internal rotation at 0°: 5     Isolated Muscles   Lower trapezius: 4-   Middle trapezius: 4-     Left Elbow   Flexion: 4+  Extension: 4+    Right Elbow   Flexion: 5  Extension: 5    Left Wrist/Hand   Wrist extension: 5  Wrist flexion: 4+  Radial deviation: 4+  Ulnar deviation: 4+    Right Wrist/Hand   Wrist extension: 5  Wrist flexion: 5  Radial deviation: 5  Ulnar deviation: 5    Tests   Cervical   Positive neck flexor muscle endurance test     Left   Positive Spurling's Test A and cervical flexion-rotation test       Right   Negative Spurling's Test A  Left Shoulder   Negative Speed's, ULTT1, ULTT3 and ULTT4  Right Shoulder   Positive Speed's  Negative ULTT1, ULTT2, ULTT3 and ULTT4         Flowsheet Rows    Flowsheet Row Most Recent Value   PT/OT G-Codes    Current Score 30   Projected Score 57             Precautions: anxiety, asthma, DM, GERD, HTN     Daily Treatment Diary      Assessment  8/2                     Eval/Reval  MD                     FOTO  30/57       **         **   Manuals    L 1st Rib Mob                        L UT & LS Stretch and STM              L Cervical Opening Mobs C3-C6              Cervical Traction              PA Mobs T1-T6             Exercise Diary     Supine Chin Tucks                        Upper Thoracic Extension Stretch - Rolled Towel                        Supine 1/2 Foam Roll Pec Stretch                        Seated UT & LS Stretches  20"x3 ea                      Biceps/Pec Wall Stretch  20"x3                                                                                                                                                                                                                                                                                             Modalities    MHP C-T Spine  90/90  Pre-Tx

## 2022-08-04 ENCOUNTER — OFFICE VISIT (OUTPATIENT)
Dept: PHYSICAL THERAPY | Facility: REHABILITATION | Age: 42
End: 2022-08-04
Payer: COMMERCIAL

## 2022-08-04 DIAGNOSIS — M54.12 CERVICAL RADICULOPATHY: Primary | ICD-10-CM

## 2022-08-04 DIAGNOSIS — M25.512 ACUTE PAIN OF LEFT SHOULDER: ICD-10-CM

## 2022-08-04 PROCEDURE — 97140 MANUAL THERAPY 1/> REGIONS: CPT | Performed by: PHYSICAL THERAPIST

## 2022-08-04 PROCEDURE — 97112 NEUROMUSCULAR REEDUCATION: CPT | Performed by: PHYSICAL THERAPIST

## 2022-08-04 NOTE — PROGRESS NOTES
Daily Note     Today's date: 2022  Patient name: Tracy Coelho  : 1980  MRN: [id number]  Referring provider: Zhen Alfaro, PT  Dx:   Encounter Diagnosis     ICD-10-CM    1  Cervical radiculopathy  M54 12    2  Acute pain of left shoulder  M25 512                   Subjective: Pt reports he had increased soreness following evaluation, tried his home exercises with good response  Objective: See treatment diary below  Pt issued updated HEP to which he demonstrated and verbalized understanding  Assessment: Pt with good response to addition of manual techniques noting improved mobility and decreased pain with completion  Pt with good tolerance to progression of program with addition of supine chin tucks  He required moderate verbal and manual cues for correct position and performance with all exercises  Will continue to progress program as able  Pt will benefit from continued skilled PT intervention in order to address his remaining limitations and to restore maximal function  Plan: Continue per plan of care  Progress treatment as tolerated         Precautions: anxiety, asthma, DM, GERD, HTN     Daily Treatment Diary      Assessment                     Eval/Reval  MD                     FOTO         **         **   Manuals    L 1st Rib Mob    MD                    L UT & LS Stretch and STM   MD            L Cervical Opening Mobs C3-C6   MD            Cervical Traction   MD            PA Mobs T1-T6             Exercise Diary     Supine Chin Tucks    5"x10                    Upper Thoracic Extension Stretch - Rolled Towel    NV                    Supine 1/2 Foam Roll Pec Stretch                        Seated UT & LS Stretches  20"x3 ea  30"x3 ea                    Biceps/Pec Wall Stretch  20"x3  30"x3 Modalities    [facility] C-T Spine  90/90  Pre-Tx    10'

## 2022-08-09 ENCOUNTER — APPOINTMENT (OUTPATIENT)
Dept: PHYSICAL THERAPY | Facility: REHABILITATION | Age: 42
End: 2022-08-09
Payer: COMMERCIAL

## 2022-08-10 ENCOUNTER — OFFICE VISIT (OUTPATIENT)
Dept: PHYSICAL THERAPY | Facility: REHABILITATION | Age: 42
End: 2022-08-10
Payer: COMMERCIAL

## 2022-08-10 DIAGNOSIS — M54.12 CERVICAL RADICULOPATHY: Primary | ICD-10-CM

## 2022-08-10 DIAGNOSIS — M25.512 ACUTE PAIN OF LEFT SHOULDER: ICD-10-CM

## 2022-08-10 PROCEDURE — 97140 MANUAL THERAPY 1/> REGIONS: CPT | Performed by: PHYSICAL THERAPIST

## 2022-08-10 PROCEDURE — 97112 NEUROMUSCULAR REEDUCATION: CPT | Performed by: PHYSICAL THERAPIST

## 2022-08-10 NOTE — PROGRESS NOTES
Daily Note     Today's date: 8/10/2022  Patient name: Sylvie Parrish  : 1980  MRN: [id number]  Referring provider: María Savage, PT  Dx:   Encounter Diagnosis     ICD-10-CM    1  Cervical radiculopathy  M54 12    2  Acute pain of left shoulder  M25 512                   Subjective: Pt reports that he has increased pain this morning  Mornings tend to be very bad in general       Objective: See treatment diary below  Assessment: Scalene spasm and tenderness noted  He required correction of technique with supine chin tucks and upper trap stretches  Will continue to progress program as able  Pt will benefit from continued skilled PT intervention in order to address his remaining limitations and to restore maximal function  Plan: Continue per plan of care  Progress treatment as tolerated         Precautions: anxiety, asthma, DM, GERD, HTN     Daily Treatment Diary      Assessment  8/2  8/4  8/10                 Иван/Reval  MD                     FOTO         **         **   Manuals    L 1st Rib Mob    MD  NT                  L UT & LS Stretch and STM   MD ALICEA           L Cervical Opening Mobs C3-C6   MD NT           Cervical Traction   MD NT           PA Mobs T1-T6             Exercise Diary     Supine Chin Tucks    5"x10  5"x10                  Upper Thoracic Extension Stretch - Rolled Towel    NV  1 min                  Supine 1/2 Foam Roll Pec Stretch                        Seated UT & LS Stretches  20"x3 ea  30"x3 ea  30"x3                  Biceps/Pec Wall Stretch  20"x3  30"x3  30"x3                                                                                                                                                                                                                                                                                         Modalities    P C-T Spine  90/90  Pre-Tx    10'  10'

## 2022-08-11 ENCOUNTER — OFFICE VISIT (OUTPATIENT)
Dept: PHYSICAL THERAPY | Facility: REHABILITATION | Age: 42
End: 2022-08-11
Payer: COMMERCIAL

## 2022-08-11 DIAGNOSIS — M25.512 ACUTE PAIN OF LEFT SHOULDER: ICD-10-CM

## 2022-08-11 DIAGNOSIS — M54.12 CERVICAL RADICULOPATHY: Primary | ICD-10-CM

## 2022-08-11 PROCEDURE — 97140 MANUAL THERAPY 1/> REGIONS: CPT

## 2022-08-11 PROCEDURE — 97110 THERAPEUTIC EXERCISES: CPT

## 2022-08-11 NOTE — PROGRESS NOTES
Daily Note     Today's date: 2022  Patient name: Bill Moncada  : 1980  MRN: [id number]  Referring provider: Deanna Phan, PT  Dx:   Encounter Diagnosis     ICD-10-CM    1  Cervical radiculopathy  M54 12    2  Acute pain of left shoulder  M25 512                   Subjective: pt reports less pain than yesterday, but feels tightness and pressure in L UT region  He noted pain slightly increased pain following yesterday's session in which he took Motrin for pain relief  He noted some pain relief by the end of the day  Objective: See treatment diary below      Assessment: Tolerated treatment well and without complaints  Responds well to exercises and manuals with no adverse reaction  Relief noted post treatment  Patient demonstrated fatigue post treatment, exhibited good technique with therapeutic exercises and would benefit from continued PT  Plan: Continue per plan of care  Progress treatment as tolerated         Precautions: anxiety, asthma, DM, GERD, HTN     Daily Treatment Diary      Assessment  8/2  8/4  8/10  8/11               Eval/Reval  MD                     FOTO         **         **   Manuals    L 1st Rib Mob    MD  NT  TE                L UT & LS Stretch and STM   MD NT TE          L Cervical Opening Mobs C3-C6   MD ALICEA           Cervical Traction   MD ALICEA TE          PA Mobs T1-T6             Exercise Diary     Supine Chin Tucks    5"x10  5"x10  5"x10                Upper Thoracic Extension Stretch - Rolled Towel    NV  1 min  1 min                Supine 1/2 Foam Roll Pec Stretch                        Seated UT & LS Stretches  20"x3 ea  30"x3 ea  30"x3  30"x3                Biceps/Pec Wall Stretch  20"x3  30"x3  30"x3  30"x3 Modalities    [facility] C-T Spine  90/90  Pre-Tx    10'  10' 10'

## 2022-08-16 ENCOUNTER — OFFICE VISIT (OUTPATIENT)
Dept: PHYSICAL THERAPY | Facility: REHABILITATION | Age: 42
End: 2022-08-16
Payer: COMMERCIAL

## 2022-08-16 DIAGNOSIS — M25.512 ACUTE PAIN OF LEFT SHOULDER: ICD-10-CM

## 2022-08-16 DIAGNOSIS — M54.12 CERVICAL RADICULOPATHY: Primary | ICD-10-CM

## 2022-08-16 PROCEDURE — 97110 THERAPEUTIC EXERCISES: CPT

## 2022-08-16 PROCEDURE — 97140 MANUAL THERAPY 1/> REGIONS: CPT

## 2022-08-16 NOTE — PROGRESS NOTES
Daily Note     Today's date: 2022  Patient name: Tai Fabian  : 1980  MRN: [id number]  Referring provider: Odette Salinas, PT  Dx:   Encounter Diagnosis     ICD-10-CM    1  Cervical radiculopathy  M54 12    2  Acute pain of left shoulder  M25 512                   Subjective: pt reports he felt good following lv, but was taking off his shirt on  and he felt some pain under his left clavicle that lasted at least the rest of the day  He took Motrin, which helped with the pain  He currently presents to therapy denying pain or adverse symptoms  Objective: See treatment diary below      Assessment: Pt with good tolerance to TE and manuals  Cervical ROM is improving with manuals and stretching  Will continue to progress as tolerated  Pt would benefit from continued skilled PT to improve current deficits and maximize function  Plan: Continue per plan of care  Progress treatment as tolerated         Precautions: anxiety, asthma, DM, GERD, HTN     Daily Treatment Diary      Assessment  8/2  8/4  8/10  8/11  8/16             Eval/Reval  MD                     FOTO  30/57       perf         **   Manuals    L 1st Rib Mob    MD  NT  TE                L UT & LS Stretch and STM   MD NT TE SC         L Cervical Opening Mobs C3-C6   MD ALICEA           Cervical Traction   MD ALICEA TE SC         PA Mobs T1-T6             Exercise Diary     Supine Chin Tucks    5"x10  5"x10  5"x10  5"x10              Upper Thoracic Extension Stretch - Rolled Towel    NV  1 min  1 min  1 min              Supine 1/2 Foam Roll Pec Stretch                        Seated UT & LS Stretches  20"x3 ea  30"x3 ea  30"x3  30"x3  30"x3              Biceps/Pec Wall Stretch  20"x3  30"x3  30"x3  30"x3  30"x3 Modalities    [facility] C-T Spine  90/90  Pre-Tx    10'  10' 10'  10'

## 2022-08-18 ENCOUNTER — OFFICE VISIT (OUTPATIENT)
Dept: PHYSICAL THERAPY | Facility: REHABILITATION | Age: 42
End: 2022-08-18
Payer: COMMERCIAL

## 2022-08-18 DIAGNOSIS — M25.512 ACUTE PAIN OF LEFT SHOULDER: ICD-10-CM

## 2022-08-18 DIAGNOSIS — M54.12 CERVICAL RADICULOPATHY: Primary | ICD-10-CM

## 2022-08-18 PROCEDURE — 97140 MANUAL THERAPY 1/> REGIONS: CPT

## 2022-08-18 PROCEDURE — 97110 THERAPEUTIC EXERCISES: CPT

## 2022-08-18 NOTE — PROGRESS NOTES
Daily Note     Today's date: 2022  Patient name: Pieter Lanza  : 1980  MRN: [id number]  Referring provider: Puma Vasquez, PT  Dx:   Encounter Diagnosis     ICD-10-CM    1  Cervical radiculopathy  M54 12    2  Acute pain of left shoulder  M25 512                   Subjective: pt reports with c/o pian in left cervical region with driving for about 5 min  He currently noted relief following therapy, but feels like his pain has not changed much since beginning therapy  Objective: See treatment diary below      Assessment: Tolerated treatment well and without complaints  Positive response with manuals as he reported relief upon completion  Patient demonstrated fatigue post treatment, exhibited good technique with therapeutic exercises and would benefit from continued PT      Plan: Continue per plan of care  Progress treatment as tolerated         Precautions: anxiety, asthma, DM, GERD, HTN     Daily Treatment Diary      Assessment  8/2  8/4  8/10  8/11  8/16  8/18           Eval/Reval  MD                     FOTO  30/57       perf         **   Manuals    L 1st Rib Mob    MD  NT  TE    TE            L UT & LS Stretch and STM   MD NT TE SC TE        L Cervical Opening Mobs C3-C6   MD ALICEA           Cervical Traction   MD NIXON BURGER SC TE        PA Mobs T1-T6             Exercise Diary     Supine Chin Tucks    5"x10  5"x10  5"x10  5"x10  5"x10            Upper Thoracic Extension Stretch - Rolled Towel    NV  1 min  1 min  1 min  1 min /c foam            Supine 1/2 Foam Roll Pec Stretch                        Seated UT & LS Stretches  20"x3 ea  30"x3 ea  30"x3  30"x3  30"x3 30"x3            Biceps/Pec Wall Stretch  20"x3  30"x3  30"x3  30"x3  30"x3  30"x3 Modalities    [email or web address] C-T Spine  90/90  Pre-Tx    10'  10' 10'  10'

## 2022-08-23 ENCOUNTER — APPOINTMENT (OUTPATIENT)
Dept: PHYSICAL THERAPY | Facility: REHABILITATION | Age: 42
End: 2022-08-23
Payer: COMMERCIAL

## 2022-08-25 ENCOUNTER — APPOINTMENT (OUTPATIENT)
Dept: PHYSICAL THERAPY | Facility: REHABILITATION | Age: 42
End: 2022-08-25
Payer: COMMERCIAL

## 2022-08-30 ENCOUNTER — OFFICE VISIT (OUTPATIENT)
Dept: PHYSICAL THERAPY | Facility: REHABILITATION | Age: 42
End: 2022-08-30
Payer: COMMERCIAL

## 2022-08-30 DIAGNOSIS — M25.512 ACUTE PAIN OF LEFT SHOULDER: ICD-10-CM

## 2022-08-30 DIAGNOSIS — M54.12 CERVICAL RADICULOPATHY: Primary | ICD-10-CM

## 2022-08-30 PROCEDURE — 97110 THERAPEUTIC EXERCISES: CPT

## 2022-08-30 PROCEDURE — 97140 MANUAL THERAPY 1/> REGIONS: CPT

## 2022-08-30 NOTE — PROGRESS NOTES
Daily Note     Today's date: 2022  Patient name: Jesús Sanchez  : 1980  MRN: [id number]  Referring provider: Kerri Chiu, PT  Dx:   Encounter Diagnosis     ICD-10-CM    1  Cervical radiculopathy  M54 12    2  Acute pain of left shoulder  M25 512                   Subjective: pt reports he was able to tolerate driving to vacation without increased pain or adverse symptoms  He reports 2 incidences of "popping" in his L shoulder that produced a little pain afterward and states he had neck spasms yesterday  He currently feels some mild pain around L collarbone  He states he was really sore after lv and has had 2 migraines in the past week  Objective: See treatment diary below      Assessment: Tolerated treatment well  Favorable response to manuals as pt reports decreased pain and improved mobility afterward  Good tolerance to addition of pec stretch on foam  Continues to demonstrates weakness and fatigue with chin tucks  Pt would benefit from continued skilled PT to improve current deficits and maximize function  Plan: Continue per plan of care  Progress treatment as tolerated         Precautions: anxiety, asthma, DM, GERD, HTN     Daily Treatment Diary      Assessment  8/2  8/4  8/10  8/11  8/16  8/18  8/30         Eval/Reval  MD                     FOTO         perf         **   Manuals    L 1st Rib Mob    MD  NT  TE    TE            L UT & LS Stretch and STM   MD NT TE SC TE SC       L Cervical Opening Mobs C3-C6   MD NT           Cervical Traction   MD ALICEA TE SC TE SC       PA Mobs T1-T6             Exercise Diary     Supine Chin Tucks    5"x10  5"x10  5"x10  5"x10  5"x10  5"x10          Upper Thoracic Extension Stretch - Rolled Towel    NV  1 min  1 min  1 min  1 min /c foam  1 min  foam          Supine 1/2 Foam Roll Pec Stretch              1 min          Seated UT & LS Stretches  20"x3 ea  30"x3 ea  30"x3  30"x3  30"x3 30"x3  30"x3          Biceps/Pec Wall Stretch  20"x3  30"x3  30"x3 30"x3  30"x3  30"x3  30"x3                                                                                                                                                                                                                                                                                 Modalities    [facility] C-T Spine  90/90  Pre-Tx    10'  10' 10'  10'  10'  10'

## 2022-09-01 ENCOUNTER — OFFICE VISIT (OUTPATIENT)
Dept: PHYSICAL THERAPY | Facility: REHABILITATION | Age: 42
End: 2022-09-01
Payer: COMMERCIAL

## 2022-09-01 DIAGNOSIS — M25.512 ACUTE PAIN OF LEFT SHOULDER: ICD-10-CM

## 2022-09-01 DIAGNOSIS — M54.12 CERVICAL RADICULOPATHY: Primary | ICD-10-CM

## 2022-09-01 PROCEDURE — 97140 MANUAL THERAPY 1/> REGIONS: CPT

## 2022-09-01 PROCEDURE — 97110 THERAPEUTIC EXERCISES: CPT

## 2022-09-01 NOTE — PROGRESS NOTES
Daily Note     Today's date: 2022  Patient name: Bill Moncada  : 1980  MRN: [id number]  Referring provider: Deanna Phan, PT  Dx:   Encounter Diagnosis     ICD-10-CM    1  Cervical radiculopathy  M54 12    2  Acute pain of left shoulder  M25 512                   Subjective: pt reports having pain in L C-S yesterday that was causing a small HA, which did resolve  He continues to have anterior shoulder pain into collarbone that he describes as a "heavy" feeling with some stabbing pain  Objective: See treatment diary below      Assessment: Tolerated treatment well with favorable response to manuals, no reported pain in anterior shoulder/collarbone afterward  Mod proximal L UT restriction noted with manuals  UT tightness and soreness remains post treatment with mild relief  Cues to correct form with UT/LS stretch with good pt carryover  Pt would benefit from continued skilled PT to improve current deficits and return to PLOF  Plan: Continue per plan of care  Progress treatment as tolerated         Precautions: anxiety, asthma, DM, GERD, HTN     Daily Treatment Diary      Assessment  8/2  8/4  8/10  8/11  8/16  8/18  8/30  9/1       Eval/Reval  MD                     FOTO         perf         **   Manuals    L 1st Rib Mob    MD  NT  TE    TE            L UT & LS Stretch and STM   MD NT TE SC TE SC SC      L Cervical Opening Mobs C3-C6   MD NT           Cervical Traction   MD ALICEA TE SC TE SC SC      PA Mobs T1-T6             Exercise Diary     Supine Chin Tucks    5"x10  5"x10  5"x10  5"x10  5"x10  5"x10  5"x10        Upper Thoracic Extension Stretch - Rolled Towel    NV  1 min  1 min  1 min  1 min /c foam  1 min  foam  1 min /c foam        Supine 1/2 Foam Roll Pec Stretch              1 min          Seated UT & LS Stretches  20"x3 ea  30"x3 ea  30"x3  30"x3  30"x3 30"x3  30"x3  30"x3        Biceps/Pec Wall Stretch  20"x3  30"x3  30"x3  30"x3  30"x3  30"x3  30"x3 30"x3 Modalities    P C-T Spine  90/90  Pre-Tx    10'  10' 10'  10'  10'  10'  10'

## 2022-09-06 ENCOUNTER — APPOINTMENT (OUTPATIENT)
Dept: PHYSICAL THERAPY | Facility: REHABILITATION | Age: 42
End: 2022-09-06
Payer: COMMERCIAL

## 2022-09-07 ENCOUNTER — OFFICE VISIT (OUTPATIENT)
Dept: PHYSICAL THERAPY | Facility: REHABILITATION | Age: 42
End: 2022-09-07
Payer: COMMERCIAL

## 2022-09-07 DIAGNOSIS — M25.512 ACUTE PAIN OF LEFT SHOULDER: ICD-10-CM

## 2022-09-07 DIAGNOSIS — M54.12 CERVICAL RADICULOPATHY: Primary | ICD-10-CM

## 2022-09-07 PROCEDURE — 97110 THERAPEUTIC EXERCISES: CPT

## 2022-09-07 PROCEDURE — 97140 MANUAL THERAPY 1/> REGIONS: CPT

## 2022-09-07 NOTE — PROGRESS NOTES
Daily Note     Today's date: 2022  Patient name: Kofi Brar  : 1980  MRN: [id number]  Referring provider: Jonathan Villafana, PT  Dx:   Encounter Diagnosis     ICD-10-CM    1  Cervical radiculopathy  M54 12    2  Acute pain of left shoulder  M25 512                   Subjective: Pt reports that he feels like he has a little more range but it also feels like somebody is jabbing their thumb right behind his L collar bone with constant pressure  Pt reports that he did get an injection in his L shoulder, pain wise it isn't horrible and feels like his has more range but his pain is still at about a 7/10  Pt reports that he has another follow up with his doctor on   Objective: See treatment diary below      Assessment: Pt tolerated treatment well  Significant UT and LS tightness palpated during STM which was min-mod decreased post manuals  Patient demonstrated fatigue post treatment, exhibited good technique with therapeutic exercises and would benefit from continued PT  Plan: Continue per plan of care  Progress treatment as tolerated         Precautions: anxiety, asthma, DM, GERD, HTN     Daily Treatment Diary      Assessment  8/2  8/4  8/10  8/11  8/16  8/18  8/30  9/1  9/7     Eval/Reval  MD                     FOTO         perf         **   Manuals    L 1st Rib Mob    MD  NT  TE    TE            L UT & LS Stretch and STM   MD ALICEA TE SC TE SC SC ML     L Cervical Opening Mobs C3-C6   MD NT           Cervical Traction   MD ALICEA TE SC TE SC SC ML     PA Mobs T1-T6             Exercise Diary     Supine Chin Tucks    5"x10  5"x10  5"x10  5"x10  5"x10  5"x10  5"x10  5"x10      Upper Thoracic Extension Stretch - Rolled Towel    NV  1 min  1 min  1 min  1 min /c foam  1 min  foam  1 min /c foam  1 min /c foam      Supine 1/2 Foam Roll Pec Stretch              1 min          Seated UT & LS Stretches  20"x3 ea  30"x3 ea  30"x3  30"x3  30"x3 30"x3  30"x3  30"x3  30"x3      Biceps/Pec Wall Stretch  20"x3  30"x3  30"x3  30"x3  30"x3  30"x3  30"x3 30"x3  30"x3                                                                                                                                                                                                                                                                             Modalities    [facility] C-T Spine  90/90  Pre-Tx    10'  10' 10'  10'  10'  10'  10'  10'

## 2022-09-08 ENCOUNTER — OFFICE VISIT (OUTPATIENT)
Dept: PHYSICAL THERAPY | Facility: REHABILITATION | Age: 42
End: 2022-09-08
Payer: COMMERCIAL

## 2022-09-08 DIAGNOSIS — M54.12 CERVICAL RADICULOPATHY: Primary | ICD-10-CM

## 2022-09-08 DIAGNOSIS — M25.512 ACUTE PAIN OF LEFT SHOULDER: ICD-10-CM

## 2022-09-08 PROCEDURE — 97140 MANUAL THERAPY 1/> REGIONS: CPT

## 2022-09-08 PROCEDURE — 97110 THERAPEUTIC EXERCISES: CPT

## 2022-09-08 NOTE — PROGRESS NOTES
Daily Note     Today's date: 2022  Patient name: Celia Ganser  : 1980  MRN: [id number]  Referring provider: Stewart Dao, PT  Dx:   Encounter Diagnosis     ICD-10-CM    1  Cervical radiculopathy  M54 12    2  Acute pain of left shoulder  M25 512                   Subjective: Pt reports he feels a little better than yesterday  He was putting on his shirt prior to coming to therapy and states he felt "that squishy feeling" near his collarbone  He states his is able to do more activities around the house with less irritability, however he still struggles with overhead activities  Objective: See treatment diary below      Assessment: Pt tolerated treatment well  Pt with decreased proximal cervical restriction, mod restriction along distal UT/LS with mild relief post manuals  Patient demonstrated fatigue post treatment, exhibited good technique with therapeutic exercises and would benefit from continued PT  Plan: Continue per plan of care  Progress treatment as tolerated         Precautions: anxiety, asthma, DM, GERD, HTN     Daily Treatment Diary      Assessment  8/2  8/4  8/10  8/11  8/16  8/18  8/30  9/1  9/7  9/8   Eval/Reval  MD                     FOTO         perf         perf   Manuals    L 1st Rib Mob    MD  NT  TE    TE            L UT & LS Stretch and STM   MD NT TE SC TE SC SC ML SC    L Cervical Opening Mobs C3-C6   MD NT           Cervical Traction   MD NT TE SC TE SC SC ML SC    PA Mobs T1-T6             Exercise Diary     Supine Chin Tucks    5"x10  5"x10  5"x10  5"x10  5"x10  5"x10  5"x10  5"x10  5"x10    Upper Thoracic Extension Stretch - Rolled Towel    NV  1 min  1 min  1 min  1 min /c foam  1 min  foam  1 min /c foam  1 min /c foam  1'x2 /c foam    Supine 1/2 Foam Roll Pec Stretch              1 min          Seated UT & LS Stretches  20"x3 ea  30"x3 ea  30"x3  30"x3  30"x3 30"x3  30"x3  30"x3  30"x3  30"x3    Biceps/Pec Wall Stretch  20"x3  30"x3  30"x3  30"x3  30"x3  30"x3 30"x3 30"x3  30"x3  30"x3                                                                                                                                                                                                                                                                           Modalities    [facility] C-T Spine  90/90  Pre-Tx    10'  10' 10'  10'  10'  10'  10'  10'  10'

## 2022-09-13 ENCOUNTER — OFFICE VISIT (OUTPATIENT)
Dept: PHYSICAL THERAPY | Facility: REHABILITATION | Age: 42
End: 2022-09-13
Payer: COMMERCIAL

## 2022-09-13 DIAGNOSIS — M25.512 ACUTE PAIN OF LEFT SHOULDER: ICD-10-CM

## 2022-09-13 DIAGNOSIS — M54.12 CERVICAL RADICULOPATHY: Primary | ICD-10-CM

## 2022-09-13 PROCEDURE — 97110 THERAPEUTIC EXERCISES: CPT

## 2022-09-13 PROCEDURE — 97140 MANUAL THERAPY 1/> REGIONS: CPT

## 2022-09-13 NOTE — PROGRESS NOTES
Daily Note     Today's date: 2022  Patient name: Jerica Caputo  : 1980  MRN: [id number]  Referring provider: Lina Segura, PT  Dx:   Encounter Diagnosis     ICD-10-CM    1  Cervical radiculopathy  M54 12    2  Acute pain of left shoulder  M25 512                   Subjective: Pt reports that he continues to have his good days and bad days  Objective: See treatment diary below      Assessment: Pt tolerated treatment well  Soft tissue restrictions were present with manuals today L>R  Patient demonstrated fatigue post treatment, exhibited good technique with therapeutic exercises and would benefit from continued PT  Plan: Continue per plan of care  Progress treatment as tolerated         Precautions: anxiety, asthma, DM, GERD, HTN     Daily Treatment Diary      Assessment    Eval/Reval                    FOTO      perf         perf   Manuals    L 1st Rib Mob     TE    TE            L UT & LS Stretch and STM MM   TE SC TE SC SC ML SC    L Cervical Opening Mobs C3-C6              Cervical Traction MM   TE SC TE SC SC ML SC    PA Mobs T1-T6             Exercise Diary     Supine Chin Tucks 5"x10    5"x10  5"x10  5"x10  5"x10  5"x10  5"x10  5"x10    Upper Thoracic Extension Stretch - Rolled Towel 1'x2 /c foam    1 min  1 min  1 min /c foam  1 min  foam  1 min /c foam  1 min /c foam  1'x2 /c foam    Supine 1/2 Foam Roll Pec Stretch           1 min          Seated UT & LS Stretches 30"x3    30"x3  30"x3 30"x3  30"x3  30"x3  30"x3  30"x3    Biceps/Pec Wall Stretch 30"x3    30"x3  30"x3  30"x3  30"x3 30"x3  30"x3  30"x3                                                                                                                                                                                                                                          Modalities    P C-T Spine  90/90  Pre-Tx 10'   10'  10'  10'  10'  10'  10'  10'

## 2022-09-14 DIAGNOSIS — I10 ESSENTIAL HYPERTENSION: ICD-10-CM

## 2022-09-14 DIAGNOSIS — J30.1 SEASONAL ALLERGIC RHINITIS DUE TO POLLEN: ICD-10-CM

## 2022-09-14 RX ORDER — BENAZEPRIL HYDROCHLORIDE 10 MG/1
TABLET ORAL
Qty: 90 TABLET | Refills: 2 | Status: SHIPPED | OUTPATIENT
Start: 2022-09-14

## 2022-09-14 RX ORDER — MONTELUKAST SODIUM 10 MG/1
TABLET ORAL
Qty: 90 TABLET | Refills: 2 | Status: SHIPPED | OUTPATIENT
Start: 2022-09-14

## 2022-09-15 ENCOUNTER — OFFICE VISIT (OUTPATIENT)
Dept: PHYSICAL THERAPY | Facility: REHABILITATION | Age: 42
End: 2022-09-15
Payer: COMMERCIAL

## 2022-09-15 DIAGNOSIS — M54.12 CERVICAL RADICULOPATHY: Primary | ICD-10-CM

## 2022-09-15 DIAGNOSIS — M25.512 ACUTE PAIN OF LEFT SHOULDER: ICD-10-CM

## 2022-09-15 PROCEDURE — 97110 THERAPEUTIC EXERCISES: CPT

## 2022-09-15 PROCEDURE — 97140 MANUAL THERAPY 1/> REGIONS: CPT

## 2022-09-15 NOTE — PROGRESS NOTES
Daily Note     Today's date: 9/15/2022  Patient name: Jessica Carrillo  : 1980  MRN: [id number]  Referring provider: Shamir Cuevas, PT  Dx:   Encounter Diagnosis     ICD-10-CM    1  Cervical radiculopathy  M54 12    2  Acute pain of left shoulder  M25 512                   Subjective: Pt reports he felt good after lv with some soreness that returned that night  Objective: See treatment diary below      Assessment: Pt tolerated treatment well  Pt with mod soft tissue restrictions and TrPs along mid-distal L UT  Pt with improving deep neck flexor strength during chin tucks, noting decreased muscle fatigue with completion  He does note some soreness following treatment and also states he is becoming a little more aware of his posture at home and how that may be affecting his pain levels  Patient demonstrated fatigue post treatment, exhibited good technique with therapeutic exercises and would benefit from continued PT  Plan: Continue per plan of care  Progress treatment as tolerated         Precautions: anxiety, asthma, DM, GERD, HTN     Daily Treatment Diary      Assessment 9/13 9/15   8/11  8/16  8/18  8/30  9/1  9/7  9/8   Eval/Reval                    FOTO      perf         perf   Manuals    L 1st Rib Mob     TE    TE            L UT & LS Stretch and STM MM SC  TE SC TE SC SC ML SC    L Cervical Opening Mobs C3-C6              Cervical Traction MM SC  TE SC TE SC SC ML SC    PA Mobs T1-T6             Exercise Diary     Supine Chin Tucks 5"x10 5"x10   5"x10  5"x10  5"x10  5"x10  5"x10  5"x10  5"x10    Upper Thoracic Extension Stretch - Rolled Towel 1'x2 /c foam 1'x2 /c foam   1 min  1 min  1 min /c foam  1 min  foam  1 min /c foam  1 min /c foam  1'x2 /c foam    Supine 1/2 Foam Roll Pec Stretch           1 min          Seated UT & LS Stretches 30"x3 30"x3   30"x3  30"x3 30"x3  30"x3  30"x3  30"x3  30"x3    Biceps/Pec Wall Stretch 30"x3    30"x3  30"x3  30"x3  30"x3 30"x3  30"x3  30"x3 Modalities    P C-T Spine  90/90  Pre-Tx 10'   10'  10'  10'  10'  10'  10'  10'

## 2022-09-20 ENCOUNTER — OFFICE VISIT (OUTPATIENT)
Dept: PHYSICAL THERAPY | Facility: REHABILITATION | Age: 42
End: 2022-09-20
Payer: COMMERCIAL

## 2022-09-20 DIAGNOSIS — M25.512 ACUTE PAIN OF LEFT SHOULDER: ICD-10-CM

## 2022-09-20 DIAGNOSIS — M54.12 CERVICAL RADICULOPATHY: Primary | ICD-10-CM

## 2022-09-20 PROCEDURE — 97110 THERAPEUTIC EXERCISES: CPT

## 2022-09-20 PROCEDURE — 97140 MANUAL THERAPY 1/> REGIONS: CPT

## 2022-09-20 NOTE — PROGRESS NOTES
Daily Note     Today's date: 2022  Patient name: Catarina Franklin  : 1980  MRN: [id number]  Referring provider: Dominga Bowman, PT  Dx:   Encounter Diagnosis     ICD-10-CM    1  Cervical radiculopathy  M54 12    2  Acute pain of left shoulder  M25 512                   Subjective: Patient reports that he continues to have stiffness and pain on his L side  He also notes the return of pain down his L arm and he reports tingling in 3 fingers (3,4,5) which is new  Patient arrived 10 minutes late for his scheduled appt  Objective: See treatment diary below      Assessment: Patient tolerated treatment well  Patient performed ex and received manual therapy and modality as noted  Patient presents with worsening L radicular symptoms  Patient reported improvement in L finger tingling during the session  Patient was focused more on UT to L shoulder pain which he described as a strong pressure across his shoulder  Patient noted decreased symptoms post treatment  Patient would benefit from continued PT intervention to address deficits and attain set goals  Plan: Continue per plan of care        Precautions: anxiety, asthma, DM, GERD, HTN     Daily Treatment Diary      Assessment 9/13 9/15 9/20   8/16  8/18  8/30  9/1  9/7  9/8   Eval/Reval                    FOTO      perf         perf   Manuals    L 1st Rib Mob        TE            L UT & LS Stretch and STM MM SC CC  SC TE SC SC ML SC    L Cervical Opening Mobs C3-C6              Cervical Traction MM SC CC  SC TE SC SC ML SC    PA Mobs T1-T6             Exercise Diary     Supine Chin Tucks 5"x10 5"x10 5"x10   5"x10  5"x10  5"x10  5"x10  5"x10  5"x10    Upper Thoracic Extension Stretch - Rolled Towel 1'x2 /c foam 1'x2 /c foam 1'x2  c foam   1 min  1 min /c foam  1 min  foam  1 min /c foam  1 min /c foam  1'x2 /c foam    Supine 1/2 Foam Roll Pec Stretch   1'x2 c foam        1 min          Seated UT & LS Stretches 30"x3 30"x3 30"x3   30"x3 30"x3  30"x3 30"x3  30"x3  30"x3    Biceps/Pec Wall Stretch 30"x3     30"x3  30"x3  30"x3 30"x3  30"x3  30"x3                                                                                                                                                                                                                                          Modalities    [facility] C-T Spine  90/90  Pre-Tx 10'  5' 10'  10'  10'  10'  10'  10'  10'

## 2022-09-22 ENCOUNTER — OFFICE VISIT (OUTPATIENT)
Dept: PHYSICAL THERAPY | Facility: REHABILITATION | Age: 42
End: 2022-09-22
Payer: COMMERCIAL

## 2022-09-22 DIAGNOSIS — M25.512 ACUTE PAIN OF LEFT SHOULDER: ICD-10-CM

## 2022-09-22 DIAGNOSIS — M54.12 CERVICAL RADICULOPATHY: Primary | ICD-10-CM

## 2022-09-22 PROCEDURE — 97140 MANUAL THERAPY 1/> REGIONS: CPT

## 2022-09-22 PROCEDURE — 97110 THERAPEUTIC EXERCISES: CPT

## 2022-09-27 ENCOUNTER — OFFICE VISIT (OUTPATIENT)
Dept: PHYSICAL THERAPY | Facility: REHABILITATION | Age: 42
End: 2022-09-27
Payer: COMMERCIAL

## 2022-09-27 DIAGNOSIS — M54.12 CERVICAL RADICULOPATHY: Primary | ICD-10-CM

## 2022-09-27 DIAGNOSIS — M25.512 ACUTE PAIN OF LEFT SHOULDER: ICD-10-CM

## 2022-09-27 PROCEDURE — 97110 THERAPEUTIC EXERCISES: CPT

## 2022-09-27 PROCEDURE — 97140 MANUAL THERAPY 1/> REGIONS: CPT

## 2022-09-27 NOTE — PROGRESS NOTES
Daily Note     Today's date: 2022  Patient name: Krishna Ball  : 1980  MRN: [id number]  Referring provider: Gini Cintron PT  Dx:   Encounter Diagnosis     ICD-10-CM    1  Cervical radiculopathy  M54 12    2  Acute pain of left shoulder  M25 512                   Subjective: Patient reports an increased in left cervical/clavicle pain that started yesterday, and worsened today  Objective: See treatment diary below      Assessment: Patient continues to tolerate treatment despite pain levels  Patient continues to have discomfort in left clavicle/UT region  Slight relief of symptoms reported post manual therapy  Plan: Continue per plan of care        Precautions: anxiety, asthma, DM, GERD, HTN     Daily Treatment Diary      Assessment 9/13 9/15 9/20 9/22 9/27    9/1  9/7  9/8   Eval/Reval                FOTO            perf   Manuals    L 1st Rib Mob                 L UT & LS Stretch and STM MM SC CC MM JG   SC ML SC    L Cervical Opening Mobs C3-C6              Cervical Traction MM SC CC MM JG   SC ML SC    PA Mobs T1-T6             Exercise Diary     Supine Chin Tucks 5"x10 5"x10 5"x10 5"x10 5"x10    5"x10  5"x10  5"x10    Upper Thoracic Extension Stretch - Rolled Towel 1'x2 /c foam 1'x2 /c foam 1'x2  c foam 1'x2 w foam 1' x 2    1 min /c foam  1 min /c foam  1'x2 /c foam    Supine 1/2 Foam Roll Pec Stretch   1'x2 c foam 1'x2 c foam 1'x2 c foam            Seated UT & LS Stretches 30"x3 30"x3 30"x3 30"x3 30"x3    30"x3  30"x3  30"x3    Biceps/Pec Wall Stretch 30"x3    15"x5   30"x3  30"x3  30"x3                                                                                                                                                                                              Modalities    [facility] C-T Spine  90/90  Pre-Tx 10'  5' 10" 10'    10'  10'  10'

## 2022-09-29 ENCOUNTER — OFFICE VISIT (OUTPATIENT)
Dept: PHYSICAL THERAPY | Facility: REHABILITATION | Age: 42
End: 2022-09-29
Payer: COMMERCIAL

## 2022-09-29 DIAGNOSIS — M54.12 CERVICAL RADICULOPATHY: Primary | ICD-10-CM

## 2022-09-29 PROCEDURE — 97110 THERAPEUTIC EXERCISES: CPT

## 2022-09-29 PROCEDURE — 97140 MANUAL THERAPY 1/> REGIONS: CPT

## 2022-09-29 NOTE — PROGRESS NOTES
Daily Note     Today's date: 2022  Patient name: Hazel Smalls  : 1980  MRN: [id number]  Referring provider: Lacey Bell PT  Dx:   Encounter Diagnosis     ICD-10-CM    1  Cervical radiculopathy  M54 12        Start Time: 1620  Stop Time: 1650  Total time in clinic (min): 30 minutes    Subjective: Patient arrived at therapy reporting that his left clavicle pain has worsened "over the past few weeks " Patient states he feels his left clavicle is "swollen" and has started applying ice vs  Heat  Objective: See treatment diary below      Assessment: Poor tolerance to session due to left clavicle pain  Gentle cervical traction and UT stretches performed without complaints  Evaluating therapist informed of patient's complaints and patient will set up re-eval next week  Patient declines ice post treatment  No progressions today and all exercises performed within patient's tolerance  Plan: Continue per plan of care        Precautions: anxiety, asthma, DM, GERD, HTN     Daily Treatment Diary      Assessment 9/13 9/15 9/20 9/22 9/27 9/29   9/1  9/7  9/8   Eval/Reval                FOTO            perf   Manuals    L 1st Rib Mob                 L UT & LS Stretch and STM MM SC CC MM JG JG  SC ML SC    L Cervical Opening Mobs C3-C6              Cervical Traction MM SC CC MM JG JG  SC ML SC    PA Mobs T1-T6             Exercise Diary     Supine Chin Tucks 5"x10 5"x10 5"x10 5"x10 5"x10 5"x10   5"x10  5"x10  5"x10    Upper Thoracic Extension Stretch - Rolled Towel 1'x2 /c foam 1'x2 /c foam 1'x2  c foam 1'x2 w foam 1' x 2 1'x2   1 min /c foam  1 min /c foam  1'x2 /c foam    Supine 1/2 Foam Roll Pec Stretch   1'x2 c foam 1'x2 c foam 1'x2 c foam Hold            Seated UT & LS Stretches 30"x3 30"x3 30"x3 30"x3 30"x3    30"x3  30"x3  30"x3    Biceps/Pec Wall Stretch 30"x3    15"x5 15"x5  30"x3  30"x3  30"x3 Modalities    P C-T Spine  90/90  Pre-Tx 10'  5' 10" 10' NP   10'  10'  10'

## 2022-10-05 ENCOUNTER — EVALUATION (OUTPATIENT)
Dept: PHYSICAL THERAPY | Facility: REHABILITATION | Age: 42
End: 2022-10-05
Payer: COMMERCIAL

## 2022-10-05 DIAGNOSIS — M25.512 ACUTE PAIN OF LEFT SHOULDER: ICD-10-CM

## 2022-10-05 DIAGNOSIS — M54.12 CERVICAL RADICULOPATHY: Primary | ICD-10-CM

## 2022-10-05 PROCEDURE — 97164 PT RE-EVAL EST PLAN CARE: CPT | Performed by: PHYSICAL THERAPIST

## 2022-10-05 PROCEDURE — 97140 MANUAL THERAPY 1/> REGIONS: CPT | Performed by: PHYSICAL THERAPIST

## 2022-10-05 PROCEDURE — 97112 NEUROMUSCULAR REEDUCATION: CPT | Performed by: PHYSICAL THERAPIST

## 2022-10-05 NOTE — PROGRESS NOTES
PT Re-Evaluation     Today's date: 10/5/2022  Patient name: Angie Eubanks  : 1980  MRN: [id number]  Referring provider: Liya Salcedo PT  Dx:   Encounter Diagnosis     ICD-10-CM    1  Cervical radiculopathy  M54 12    2  Acute pain of left shoulder  M25 512                   Assessment  Assessment details: Pt is a 36year old right-handed male presenting to PT with 4 month history of left cervical and shoulder pain due to work-related injury  Pt has made good overall progress with this cervical and left shoulder range of motion, deep cervical and left shoulder strength, ability to perform ADLs with left upper extremity and tolerance to activity  Pt with resolution of sleep disturbance secondary to pain  Pt continues with deficits in posture and postural awareness, left shoulder ROM and strength, ADLs and activity tolerance  Pt still unable to return to work at this time due to his current deficits  Pt will benefit from continued skilled PT intervention in order to address his remaining limitations and to restore maximal function  Impairments: abnormal coordination, abnormal or restricted ROM, activity intolerance, impaired physical strength and pain with function  Understanding of Dx/Px/POC: good   Prognosis: good    Goals  ST  Patient will report 25% decrease in pain in 4 weeks  2  Patient will demonstrate 25% improvement in ROM in 4 weeks  3  Patient will demonstrate 1/2 grade improvement in strength in 4 weeks  LT  Patient will be able to perform IADLS without restriction or pain by discharge  2  Patient will be independent in HEP by discharge  3  Patient will be able to return to recreational/work duties without restriction or pain by discharge        Plan  Patient would benefit from: PT eval and skilled PT  Planned modality interventions: cryotherapy and thermotherapy: hydrocollator packs  Planned therapy interventions: IADL retraining, body mechanics training, flexibility, functional ROM exercises, home exercise program, neuromuscular re-education, manual therapy, postural training, strengthening, stretching, therapeutic activities, therapeutic exercise and joint mobilization  Frequency: 2x week  Duration in visits: 8  Duration in weeks: 4  Treatment plan discussed with: patient        Subjective Evaluation    History of Present Illness  Date of onset: 6/17/2022  Mechanism of injury: 10/5/22 Re-Evaluation:    Pt reports he feels 50% overall improvement in his cervical and left shoulder pain since starting PT  He reports he felt like he was making significant progress for the first few weeks of PT, however, approximately 3 weeks ago he felt as though he took a step backward and has been having more limitation in left shoulder mobility  He reports he has been having intermittent left clavicular swelling since his injury, this has continued throughout the past few weeks  Pt reports he has follow-up with [place] in several weeks  Pain Location: cervical spine, radiating into left shoulder and upper arm    Pain Type: stabbing, pressure ("someone sitting on shoulder")    Pain Intensity:  Current: 8  Best: 7  Worst: 9    Pt reports increased pain and/or difficulty with: driving (improved), lifting left arm above head, looking up/down/side-side (improved), lifting objects with both hands, showering, ADLs  Pt reports sleep disturbance secondary to pain waking 2 times/night (improved)    Pt reports decreased pain with: heating pad, medication, Voltaren gel  --------------------------------------------------------------------------------------------  8/2/22 Initial Evaluation:   Pt is a 36year old right-handed male presenting to PT approximately 6 weeks s/p injury at work  Pt reports around 11am he was using a  high-reach machine, when his machine was accidentally struck by another machine operated by another person   Pt reports he caught himself with left hand and felt a "whiplash-type" pain  Pt reports he went to look up a little bit later and felt a jolt of pain in left-side of his neck which then radiated down his left arm  Pt reported his injury, and was issued ice packs and Motrin by his   Pt spoke to a nurse and was referred to [facility] for further evaluation  Pt reports he went on 6/17 and was evaluated, prescribed a muscle relaxer, and then scheduled for follow-up on 6/20  Pt reports he was placed on restricted-duty, and returned to work on 6/21  Pt reports his pain became so severe he went to ER on 6/23  Pt reports x-rays performed, results as follows: The vertebral body heights including the intervertebral disc spaces are relatively maintained with no evidence for acute fracture or spondylolisthesis  Tiny degenerative endplate osteophytes seen at C5-C6  Pt reports he was prescribed Motrin and an increased dosage with muscle relaxant  Pt reports he returned to work on 6/24, then discontinued working on 6/29 when his work restriction was changed to sedentary and his employer said they could not accommodate him  Pt reports he applied for Workmen's Compensation, however, found out he was denied on 7/5/22  Pt was referred to [facility]  Pain Location: cervical spine, radiating into left shoulder and upper arm    Pain Type: stabbing, pressure ("someone sitting on shoulder"    Pain Intensity:  Current: 8  Best: 7  Worst: 10      Pt reports increased pain and/or difficulty with: driving, lifting left arm above head, looking up/down/side-side, lifting objects with both hands, showering, ADLs  Pt reports sleep disturbance secondary to pain waking 2 times/night        Pt reports decreased pain with: heating pad, medication, Voltaren gel            Not a recurrent problem   Quality of life: good      Diagnostic Tests  X-ray: normal  Treatments  Current treatment: medication  Patient Goals  Patient goals for therapy: decreased pain, increased motion, increased strength, independence with ADLs/IADLs and return to work          Objective     Postural Observations    Additional Postural Observation Details  Moderate forward head, bilateral protracted and IR shoulders, moderately increased thoracic kyphosis  Tenderness     Additional Tenderness Details  TTP L 1st rib, UT, LS, scalenes and SCM    Neurological Testing     Sensation   Cervical/Thoracic   Left   Diminished: light touch    Right   Intact: light touch    Comments   Left light touch: left clavicular region  Reflexes   Left   Biceps (C5/C6): normal (2+)  Triceps (C7): normal (2+)    Right   Biceps (C5/C6): normal (2+)  Triceps (C7): normal (2+)    Active Range of Motion   Cervical/Thoracic Spine       Cervical    Flexion: 46 degrees   Extension: 24 degrees      Left lateral flexion: 38 degrees     with pain  Right lateral flexion: 36 degrees     with pain  Left rotation: 64 degrees with pain  Right rotation: 62 degrees    with pain  Left Shoulder   Flexion: 120 degrees with pain  Abduction: 115 degrees with pain  External rotation BTH: Active external rotation behind the head: left ear w/compensatory cervical flexion  with pain  Internal rotation BTB: T12     Right Shoulder   Flexion: 155 degrees   Abduction: 155 degrees   External rotation BTH: T1   Internal rotation BTB: T10     Additional Active Range of Motion Details  Pt reports increased localized left cervical pain with extension > right lateral flexion, with general increased soreness with all planes of motion       Joint Play   Joints within functional limits: C1, C2, C3, C4 and C5     Hypomobile: C6, C7, T1, T2, T3, T4, T5 and T6     Pain: C3, C4, C5, C6 and C7     Strength/Myotome Testing     Left Shoulder     Planes of Motion   Flexion: 3+   Abduction: 3+   External rotation at 0°: 4   Internal rotation at 0°: 4     Isolated Muscles   Lower trapezius: 3   Middle trapezius: 3     Right Shoulder     Planes of Motion   Flexion: 5   Abduction: 5   External rotation at 0°: 5   Internal rotation at 0°: 5     Isolated Muscles   Lower trapezius: 4-   Middle trapezius: 4-     Left Elbow   Flexion: 5  Extension: 5    Right Elbow   Flexion: 5  Extension: 5    Left Wrist/Hand   Wrist extension: 5  Wrist flexion: 5  Radial deviation: 5  Ulnar deviation: 5    Right Wrist/Hand   Wrist extension: 5  Wrist flexion: 5  Radial deviation: 5  Ulnar deviation: 5    Tests   Cervical   Positive neck flexor muscle endurance test     Left   Positive Spurling's Test A and cervical flexion-rotation test       Right   Negative Spurling's Test A  Left Shoulder   Negative Speed's, ULTT1, ULTT3 and ULTT4  Right Shoulder   Positive Speed's  Negative ULTT1, ULTT2, ULTT3 and ULTT4                Precautions: anxiety, asthma, DM, GERD, HTN     Daily Treatment Diary      Assessment 9/13 9/15 9/20 9/22 9/27 9/29 10/5  9/1  9/7  9/8   Eval/Revmegan Carrizales Pacheco 1st Rib Mob        MD          L UT & LS Stretch and STM MM SC CC MM Saira Prieto MD SC ML SC    L Cervical Opening Mobs C3-C6        MD       Cervical Traction MM SC CC MM Saira Prieto MD SC ML SC    PA Mobs T1-T6        NV      Exercise Diary     Supine Chin Tucks 5"x10 5"x10 5"x10 5"x10 5"x10 5"x10   5"x10  5"x10  5"x10    Upper Thoracic Extension Stretch - Rolled Towel 1'x2 /c foam 1'x2 /c foam 1'x2  c foam 1'x2 w foam 1' x 2 1'x2 1/2 Foam    1 min /c foam  1 min /c foam  1'x2 /c foam    Supine 1/2 Foam Roll Pec Stretch   1'x2 c foam 1'x2 c foam 1'x2 c foam Hold  1/2 Foam  LUE supported          Seated UT & LS Stretches 30"x3 30"x3 30"x3 30"x3 30"x3  + strap  NV  30"x3  30"x3  30"x3    Biceps/Pec Wall Stretch 30"x3    15"x5 15"x5  30"x3  30"x3  30"x3   Doorway Pec Stretch       NV         Prone Scapular Retractions       NV         UBE - Retro       NV                                                                                                                                                 Modalities ANGELP C-T Spine  90/90  Pre-Tx 10'  5' 10" 10' NP 10'  10'  10'  10'

## 2022-10-11 ENCOUNTER — OFFICE VISIT (OUTPATIENT)
Dept: PHYSICAL THERAPY | Facility: REHABILITATION | Age: 42
End: 2022-10-11
Payer: COMMERCIAL

## 2022-10-11 DIAGNOSIS — M25.512 ACUTE PAIN OF LEFT SHOULDER: ICD-10-CM

## 2022-10-11 DIAGNOSIS — M54.12 CERVICAL RADICULOPATHY: Primary | ICD-10-CM

## 2022-10-11 PROCEDURE — 97110 THERAPEUTIC EXERCISES: CPT

## 2022-10-11 PROCEDURE — 97140 MANUAL THERAPY 1/> REGIONS: CPT

## 2022-10-11 PROCEDURE — 97112 NEUROMUSCULAR REEDUCATION: CPT

## 2022-10-11 NOTE — PROGRESS NOTES
Daily Note     Today's date: 10/11/2022  Patient name: Teodoro Loaiza  : 1980  MRN: [id number]  Referring provider: Lex Grey PT  Dx:   Encounter Diagnosis     ICD-10-CM    1  Cervical radiculopathy  M54 12    2  Acute pain of left shoulder  M25 512                   Subjective: Sidney Presley reports that he has been doing chores around the house and anything out of the ordinary makes him sore  Objective: See treatment diary below      Assessment: Patient tolerated treatment fair  Patient performed ex and received manual therapy as noted  Progressed ex as indicated with fair patient tolerance  Patient reported pain into his L anterior shoulder and clavicle with post UBE and doorway pec stretch which resolved quickly per the patient  Patient reported soreness post treatment however noted he had increased soreness pre treatment  Patient would benefit from continued PT intervention to address deficits and attain set goals  Plan: Continue per plan of care        Precautions: anxiety, asthma, DM, GERD, HTN     Daily Treatment Diary      Assessment 9/13 9/15 9/20 9/22 9/27 9/29 10/5 10/11   9/8   Eval/Revmegan Lee 1st Rib Mob        MD  CC glide        L UT & LS Stretch and STM MM SC CC MM Kelly Shore MD CC  SC    L Cervical Opening Mobs C3-C6        MD CC      Cervical Traction MM SC CC MM Kelly Shore MD CC  SC    PA Mobs T1-T6        NV Glides CC     Exercise Diary     Supine Chin Tucks 5"x10 5"x10 5"x10 5"x10 5"x10 5"x10     5"x10    Upper Thoracic Extension Stretch - Rolled Towel 1'x2 /c foam 1'x2 /c foam 1'x2  c foam 1'x2 w foam 1' x 2 1'x2 1/2 Foam   1/2 foam x1'    1'x2 /c foam    Supine 1/2 Foam Roll Pec Stretch   1'x2 c foam 1'x2 c foam 1'x2 c foam Hold  1/2 Foam  LUE supported  1/2 foam  Series swimmer/punches 1' ea        Seated UT & LS Stretches 30"x3 30"x3 30"x3 30"x3 30"x3  + strap  NV 20"x3  L   30"x3    Biceps/Pec Wall Stretch 30"x3    15"x5 15"x5 30"x3   Doorway Pec Stretch       NV         Prone Scapular Retractions       NV  5"x10       UBE - Retro       NV  x5'                                                                                                                                               Modalities    [facility] C-T Spine  90/90  Pre-Tx 10'  5' 10" 10' NP 10'  10'  10'  10'

## 2022-10-12 PROBLEM — Z00.00 WELL ADULT EXAM: Status: RESOLVED | Noted: 2021-10-16 | Resolved: 2022-10-12

## 2022-10-13 ENCOUNTER — OFFICE VISIT (OUTPATIENT)
Dept: PHYSICAL THERAPY | Facility: REHABILITATION | Age: 42
End: 2022-10-13
Payer: COMMERCIAL

## 2022-10-13 DIAGNOSIS — M54.12 CERVICAL RADICULOPATHY: Primary | ICD-10-CM

## 2022-10-13 DIAGNOSIS — M25.512 ACUTE PAIN OF LEFT SHOULDER: ICD-10-CM

## 2022-10-13 PROCEDURE — 97112 NEUROMUSCULAR REEDUCATION: CPT

## 2022-10-13 PROCEDURE — 97110 THERAPEUTIC EXERCISES: CPT

## 2022-10-13 PROCEDURE — 97140 MANUAL THERAPY 1/> REGIONS: CPT

## 2022-10-13 NOTE — PROGRESS NOTES
Daily Note     Today's date: 10/13/2022  Patient name: Tracy Coelho  : 1980  MRN: [id number]  Referring provider: Zhen Alfaro, PT  Dx:   Encounter Diagnosis     ICD-10-CM    1  Cervical radiculopathy  M54 12    2  Acute pain of left shoulder  M25 512                   Subjective: pt reports with c/o pain in left cervical pain with shooting pain into anterior shoulder  He noted difficulty sleeping at night time due to pain  Objective: See treatment diary below      Assessment: Tolerated treatment well and without complaints  Pt completed full dosage with exercises with no c/o significant pain  Pt instructed to perform exercises to tolerance  Patient demonstrated fatigue post treatment, exhibited good technique with therapeutic exercises and would benefit from continued PT      Plan: Continue per plan of care  Progress treatment as tolerated         Precautions: anxiety, asthma, DM, GERD, HTN     Daily Treatment Diary      Assessment 9/13 9/15 9/20 9/22 9/27 9/29 10/5 10/11 10/13  9/8   Eval/Revmegan GOLDSMITH         FOTO            perf   Manuals    L 1st Rib Mob        MD  CC glide  TE glides      L UT & LS Stretch and STM MM SC CC MM Akua Montez MD CC TE SC    L Cervical Opening Mobs C3-C6        MD CC TE     Cervical Traction MM SC CC MM Akua Montez MD CC TE SC    PA Mobs T1-T6        NV Glides CC     Exercise Diary     Supine Chin Tucks 5"x10 5"x10 5"x10 5"x10 5"x10 5"x10     5"x10    Upper Thoracic Extension Stretch - Rolled Towel 1'x2 /c foam 1'x2 /c foam 1'x2  c foam 1'x2 w foam 1' x 2 1'x2 1/2 Foam   1/2 foam x1'  1/2 foam x1'   1'x2 /c foam    Supine 1/2 Foam Roll Pec Stretch   1'x2 c foam 1'x2 c foam 1'x2 c foam Hold  1/2 Foam  LUE supported  1/2 foam  Series swimmer/punches 1' ea  1/2 foam  Series swimmer/punches 1' ea      Seated UT & LS Stretches 30"x3 30"x3 30"x3 30"x3 30"x3  + strap  NV 20"x3  L 20"x3  L  30"x3    Biceps/Pec Wall Stretch 30"x3    15"x5 15"x5     30"x3   Doorway Pec Stretch       NV Prone Scapular Retractions       NV  5"x10  5"x10     UBE - Retro       NV  x5'  x5'                                                                                                                                             Modalities    [facility] C-T Spine  90/90  Pre-Tx 10'  5' 10" 10' NP 10'  10'  10'  10'

## 2022-10-18 ENCOUNTER — OFFICE VISIT (OUTPATIENT)
Dept: PHYSICAL THERAPY | Facility: REHABILITATION | Age: 42
End: 2022-10-18
Payer: COMMERCIAL

## 2022-10-18 DIAGNOSIS — M25.512 ACUTE PAIN OF LEFT SHOULDER: ICD-10-CM

## 2022-10-18 DIAGNOSIS — M54.12 CERVICAL RADICULOPATHY: Primary | ICD-10-CM

## 2022-10-18 PROCEDURE — 97110 THERAPEUTIC EXERCISES: CPT

## 2022-10-18 PROCEDURE — 97112 NEUROMUSCULAR REEDUCATION: CPT

## 2022-10-18 PROCEDURE — 97140 MANUAL THERAPY 1/> REGIONS: CPT

## 2022-10-18 NOTE — PROGRESS NOTES
Daily Note     Today's date: 10/18/2022  Patient name: Sylvie Parrish  : 1980  MRN: [id number]  Referring provider: María Savage PT  Dx:   Encounter Diagnosis     ICD-10-CM    1  Cervical radiculopathy  M54 12    2  Acute pain of left shoulder  M25 512        Start Time: 1122  Stop Time: 1200  Total time in clinic (min): 38 minutes    Subjective: Patient reports he has seen no change in his left clavicle pain, and states he has increased pain when he rotates to the left  Patient requested heat to left shoulder today  Does not feel that therapy has improved symptoms  Objective: See treatment diary below      Assessment:  Patient tolerated all exercises well  Pt required minimal verbal cues throughout the session for form and to decrease compensatory techniques, but was able correct after cuing  All exercises and manual therapy performed within patient's tolerance  Continue to progress as tolerated  Plan: Continue per plan of care        Precautions: anxiety, asthma, DM, GERD, HTN     Daily Treatment Diary      Assessment 9/13 9/15 9/20 9/22 9/27 9/29 10/5 10/11 10/13 10/18   Eval/Reval       MD        FOTO            performed   Manuals    L 1st Rib Mob        MD  CC glide  TE glides     L UT & LS Stretch and STM MM SC CC MM Ileene Lips  MD CC TE JG    L Cervical Opening Mobs C3-C6        MD CC TE     Cervical Traction MM SC CC MM Ileene Lips  MD CC TE JG    PA Mobs T1-T6        NV Glides CC     Exercise Diary     Supine Chin Tucks 5"x10 5"x10 5"x10 5"x10 5"x10 5"x10        Upper Thoracic Extension Stretch - Rolled Towel 1'x2 /c foam 1'x2 /c foam 1'x2  c foam 1'x2 w foam 1' x 2 1'x2 1/2 Foam   1/2 foam x1'  1/2 foam x1'  1/2 foam 5"x15    Supine 1/2 Foam Roll Pec Stretch   1'x2 c foam 1'x2 c foam 1'x2 c foam Hold  1/2 Foam  LUE supported  1/2 foam  Series swimmer/punches 1' ea  1/2 foam  Series swimmer/punches 1' ea 1/2 foam pec stretch 1' x3    Seated UT & LS Stretches 30"x3 30"x3 30"x3 30"x3 30"x3  + strap  NV 20"x3  L 20"x3  L     Biceps/Pec Wall Stretch 30"x3    15"x5 15"x5       Doorway Pec Stretch       NV        Prone Scapular Retractions       NV  5"x10  5"x10 5"x20   UBE - Retro       NV  x5'  x5' x5' retro                                                                                                                                   Modalities    [facility] C-T Spine  90/90  Pre-Tx 10'  5' 10" 10' NP 10'  10'  10'

## 2022-10-20 ENCOUNTER — APPOINTMENT (OUTPATIENT)
Dept: PHYSICAL THERAPY | Facility: REHABILITATION | Age: 42
End: 2022-10-20

## 2022-10-24 ENCOUNTER — OFFICE VISIT (OUTPATIENT)
Dept: PHYSICAL THERAPY | Facility: REHABILITATION | Age: 42
End: 2022-10-24
Payer: COMMERCIAL

## 2022-10-24 DIAGNOSIS — M54.12 CERVICAL RADICULOPATHY: Primary | ICD-10-CM

## 2022-10-24 DIAGNOSIS — M25.512 ACUTE PAIN OF LEFT SHOULDER: ICD-10-CM

## 2022-10-24 PROCEDURE — 97110 THERAPEUTIC EXERCISES: CPT

## 2022-10-24 PROCEDURE — 97140 MANUAL THERAPY 1/> REGIONS: CPT

## 2022-10-24 PROCEDURE — 97112 NEUROMUSCULAR REEDUCATION: CPT

## 2022-10-24 NOTE — PROGRESS NOTES
Daily Note     Today's date: 10/24/2022  Patient name: Aracely Martinez  : 1980  MRN: [id number]  Referring provider: Keysha Herrera PT  Dx:   Encounter Diagnosis     ICD-10-CM    1  Cervical radiculopathy  M54 12    2  Acute pain of left shoulder  M25 512                   Subjective:  Salome Rush reports that nothing is changing  He continues to have sharp pain on/off and sometimes it's for no reason  He doesn't notice any improvement  Patient has a follow up with the Dr tomorrow  Objective: See treatment diary below      Assessment: Patient tolerated treatment well  Patient performed ex and received manual therapy as noted  Patient performed ex without difficulty  Patient continues with reports of pain and sharpness around his clavicle  Plan: Continue per plan of care        Precautions: anxiety, asthma, DM, GERD, HTN     Daily Treatment Diary      Assessment 10/24  9/20 9/22 9/27 9/29 10/5 10/11 10/13 10/18   Eval/Reval       MD        FOTO            performed   Manuals    L 1st Rib Mob CC glides       MD  CC glide  TE glides     L UT & LS Stretch and STM CC  CC MM Leida Lindsay MD CC TE JG    L Cervical Opening Mobs C3-C6        MD CC TE     Cervical Traction CC  CC MM Leida Lindsay MD CC TE JG    PA Mobs T1-T6        NV Glides CC     Exercise Diary     Supine Chin Tucks   5"x10 5"x10 5"x10 5"x10        Upper Thoracic Extension Stretch - Rolled Towel 1'x2 /c foam  x1'  1'x2  c foam 1'x2 w foam 1' x 2 1'x2 1/2 Foam   1/2 foam x1'  1/2 foam x1'  1/2 foam 5"x15    Supine 1/2 Foam Roll Pec Stretch 1/2 foam pec stretch, swimmers, punches x1' ea  1'x2 c foam 1'x2 c foam 1'x2 c foam Hold  1/2 Foam  LUE supported  1/2 foam  Series swimmer/punches 1' ea  1/2 foam  Series swimmer/punches 1' ea 1/2 foam pec stretch 1' x3    Seated UT & LS Stretches 20"x3  Ea B  30"x3 30"x3 30"x3  + strap  NV 20"x3  L 20"x3  L     Biceps/Pec Wall Stretch     15"x5 15"x5       Doorway Pec Stretch       NV        Prone Scapular Retractions 5" 2x10      NV  5"x10  5"x10 5"x20   UBE - Retro 5' retro      NV  x5'  x5' x5' retro                                                                                                                                   Modalities    MHP C-T Spine  90/90  Pre-Tx 10'  5' 10" 10' NP 10'  10'  10'

## 2022-10-25 ENCOUNTER — APPOINTMENT (OUTPATIENT)
Dept: PHYSICAL THERAPY | Facility: REHABILITATION | Age: 42
End: 2022-10-25

## 2022-10-27 ENCOUNTER — OFFICE VISIT (OUTPATIENT)
Dept: PHYSICAL THERAPY | Facility: REHABILITATION | Age: 42
End: 2022-10-27
Payer: COMMERCIAL

## 2022-10-27 DIAGNOSIS — M54.12 CERVICAL RADICULOPATHY: Primary | ICD-10-CM

## 2022-10-27 DIAGNOSIS — M25.512 ACUTE PAIN OF LEFT SHOULDER: ICD-10-CM

## 2022-10-27 PROCEDURE — 97112 NEUROMUSCULAR REEDUCATION: CPT

## 2022-10-27 PROCEDURE — 97110 THERAPEUTIC EXERCISES: CPT

## 2022-10-27 PROCEDURE — 97140 MANUAL THERAPY 1/> REGIONS: CPT

## 2022-10-27 NOTE — PROGRESS NOTES
Daily Note     Today's date: 10/27/2022  Patient name: J Carlos Avelar  : 1980  MRN: [id number]  Referring provider: Tami Carrera, PT  Dx:   Encounter Diagnosis     ICD-10-CM    1  Cervical radiculopathy  M54 12    2  Acute pain of left shoulder  M25 512                   Subjective: pt reports he returned to MD in which he issued a new RX to continue PT and increased 3x/wk  He further noted MD prescribed MRI for further diagnostic testing  He noted he continues with swelling in L clavicle pre-tx  Objective: See treatment diary below      Assessment: Tolerated treatment well and without significant complaints  Pt denied increased pain throughout treatment and able to complete exercises as prescribed  Patient demonstrated fatigue post treatment, exhibited good technique with therapeutic exercises and would benefit from continued PT      Plan: Continue per plan of care  Progress treatment as tolerated         Precautions: anxiety, asthma, DM, GERD, HTN     Daily Treatment Diary      Assessment 10/24 10/27 9/20 9/22 9/27 9/29 10/5 10/11 10/13 10/18   Eval/Reval       MD        FOTO            performed   Manuals    L 1st Rib Mob CC glides TE glides      MD  CC glide  TE glides     L UT & LS Stretch and STM CC TE CC MM Stacy Gabriel MD CC TE JG    L Cervical Opening Mobs C3-C6        MD CC TE     Cervical Traction CC TE CC MM Stacy Gabriel MD CC TE JG    PA Mobs T1-T6        NV Glides CC     Exercise Diary     Supine Chin Tucks   5"x10 5"x10 5"x10 5"x10        Upper Thoracic Extension Stretch - Rolled Towel 1'x2 /c foam  x1' 1'x2 /c foam 1'x2  c foam 1'x2 w foam 1' x 2 1'x2 1/2 Foam   1/2 foam x1'  1/2 foam x1'  1/2 foam 5"x15    Supine 1/2 Foam Roll Pec Stretch 1/2 foam pec stretch, swimmers, punches x1' ea 1/2 foam pec stretch, swimmers, punches x1' ea 1'x2 c foam 1'x2 c foam 1'x2 c foam Hold  1/2 Foam  LUE supported  1/2 foam  Series swimmer/punches 1' ea  1/2 foam  Series swimmer/punches 1' ea 1/2 foam pec stretch 1' x3 Seated UT & LS Stretches 20"x3  Ea B 20"x3  Ea B 30"x3 30"x3 30"x3  + strap  NV 20"x3  L 20"x3  L     Biceps/Pec Wall Stretch     15"x5 15"x5       Doorway Pec Stretch       NV        Prone Scapular Retractions 5" 2x10 5"2x10     NV  5"x10  5"x10 5"x20   UBE - Retro 5' retro 5' retro     NV  x5'  x5' x5' retro                                                                                                                                   Modalities    MHP C-T Spine  90/90  Pre-Tx 10'  5' 10" 10' NP 10'  10'  10'

## 2022-10-31 ENCOUNTER — OFFICE VISIT (OUTPATIENT)
Dept: PHYSICAL THERAPY | Facility: REHABILITATION | Age: 42
End: 2022-10-31

## 2022-10-31 DIAGNOSIS — M25.512 ACUTE PAIN OF LEFT SHOULDER: ICD-10-CM

## 2022-10-31 DIAGNOSIS — M54.12 CERVICAL RADICULOPATHY: Primary | ICD-10-CM

## 2022-10-31 NOTE — PROGRESS NOTES
Daily Note     Today's date: 10/31/2022  Patient name: J Carlos Avelar  : 1980  MRN: [id number]  Referring provider: Tami Carrera, PT  Dx:   Encounter Diagnosis     ICD-10-CM    1  Cervical radiculopathy  M54 12    2  Acute pain of left shoulder  M25 512                   Subjective: pt offered no new complaints  Objective: See treatment diary below      Assessment: Tolerated treatment well and without complaints  Initiated treatment with active warm up on UBE  Good tolerane with progressions with no adverse reactions  Decreased tightness/pain post manuals and exercises  Patient demonstrated fatigue post treatment, exhibited good technique with therapeutic exercises and would benefit from continued PT      Plan: Continue per plan of care  Progress treatment as tolerated         Precautions: anxiety, asthma, DM, GERD, HTN     Daily Treatment Diary      Assessment 10/24 10/27 10/31 9/22 9/27 9/29 10/5 10/11 10/13 10/18   Иван/Reval       MD        FOTO            performed   Manuals    L 1st Rib Mob CC glides TE glides TE víctordes     MD  CC glide  TE glides     L UT & LS Stretch and STM CC TE TE MM Stacy Gabriel MD CC TE JG    L Cervical Opening Mobs C3-C6        MD CC TE     Cervical Traction CC TE  MM Stacy Gabriel MD CC TE JG    PA Mobs T1-T6        NV Glides CC     Exercise Diary     Supine Chin Tucks    5"x10 5"x10 5"x10        Upper Thoracic Extension Stretch - Rolled Towel 1'x2 /c foam  x1' 1'x2 /c foam 1'x2  c foam 1'x2 w foam 1' x 2 1'x2 1/2 Foam   1/2 foam x1'  1/2 foam x1'  1/2 foam 5"x15    Supine 1/2 Foam Roll Pec Stretch 1/2 foam pec stretch, swimmers, punches x1' ea 1/2 foam pec stretch, swimmers, punches x1' ea 1/2 foam pec stretch, swimmers, punches x1' ea 1'x2 c foam 1'x2 c foam Hold  1/2 Foam  LUE supported  1/2 foam  Series swimmer/punches 1' ea  1/2 foam  Series swimmer/punches 1' ea 1/2 foam pec stretch 1' x3    Seated UT & LS Stretches 20"x3  Ea B 20"x3  Ea B 30"x3 30"x3 30"x3  + strap  NV 20"x3  L 20"x3  L     Biceps/Pec Wall Stretch     15"x5 15"x5       Doorway Pec Stretch   30"x3    NV        Prone Scapular Retractions 5" 2x10 5"2x10 5"2x10    NV  5"x10  5"x10 5"x20   UBE - Retro 5' retro 5' retro 3' ea    NV  x5'  x5' x5' retro   TB rows   GTB 5"x10             TB Ext   GTB 5"x10                                                                                                            Modalities    MHP C-T Spine  90/90  Pre-Tx 10'  np 10" 10' NP 10'  10'  10'

## 2022-11-01 ENCOUNTER — OFFICE VISIT (OUTPATIENT)
Dept: PHYSICAL THERAPY | Facility: REHABILITATION | Age: 42
End: 2022-11-01

## 2022-11-01 DIAGNOSIS — M54.12 CERVICAL RADICULOPATHY: Primary | ICD-10-CM

## 2022-11-01 DIAGNOSIS — M25.512 ACUTE PAIN OF LEFT SHOULDER: ICD-10-CM

## 2022-11-01 NOTE — PROGRESS NOTES
Daily Note     Today's date: 2022  Patient name: Micheline Lunsford  : 1980  MRN: [id number]  Referring provider: Erin Hawthorne, PT  Dx:   Encounter Diagnosis     ICD-10-CM    1  Cervical radiculopathy  M54 12    2  Acute pain of left shoulder  M25 512                   Subjective:  Patient reports no change, everything still hurts  Patient reports that he is scheduled for an MRI tomorrow  Patient arrived 15 minutes late for today's appt  Objective: See treatment diary below      Assessment: Patient tolerated treatment well  Patient performed ex and received manual therapy as noted  Progressed ex within patient's tolerance with cues to ensure good ex technique and improved posture  Patient noted decreased symptoms post treatment  Patient would benefit from continued PT intervention to address deficits and attain set goals  Plan: Continue per plan of care        Precautions: anxiety, asthma, DM, GERD, HTN     Daily Treatment Diary      Assessment 10/24 10/27 10/31 11/1   10/5 10/11 10/13 10/18   Eval/Reval       MD        FOTO            performed   Manuals    L 1st Rib Mob CC glides TE glides TE glides CC  glides    MD  CC glide  TE glides     L UT & LS Stretch and STM CC TE TE CC    MD CC TE JG    L Cervical Opening Mobs C3-C6        MD CC TE     Cervical Traction CC TE      MD CC TE JG    PA Mobs T1-T6        NV Glides CC     Exercise Diary     Supine Chin Tucks              Upper Thoracic Extension Stretch - Rolled Towel 1'x2 /c foam  x1' 1'x2 /c foam 1'x2  c foam 1'x2  c foam   1/2 Foam   1/2 foam x1'  1/2 foam x1'  1/2 foam 5"x15    Supine 1/2 Foam Roll Pec Stretch 1/2 foam pec stretch, swimmers, punches x1' ea 1/2 foam pec stretch, swimmers, punches x1' ea 1/2 foam pec stretch, swimmers, punches x1' ea 1/2 foam pec stretch, swimmers,punches,hugs  x1' ea   1/2 Foam  LUE supported  1/2 foam  Series swimmer/punches 1' ea  1/2 foam  Series swimmer/punches 1' ea 1/2 foam pec stretch 1' x3 Seated UT & LS Stretches 20"x3  Ea B 20"x3  Ea B 30"x3    + strap  NV 20"x3  L 20"x3  L     Biceps/Pec Wall Stretch             Doorway Pec Stretch   30"x3 30"x3   NV        Prone Scapular Retractions 5" 2x10 5"2x10 5"2x10 5"2x10   NV  5"x10  5"x10 5"x20   UBE - Retro 5' retro 5' retro 3' ea 3'/3'   NV  x5'  x5' x5' retro   TB rows   GTB 5"x10 gtb  5" 2x10            TB Ext   GTB 5"x10 gtb  5" 2x10            B ER     otb  5" x10                                                                                           Modalities    MHP C-T Spine  90/90  Pre-Tx 10'  np dc   10'  10'  10'

## 2022-11-02 ENCOUNTER — OFFICE VISIT (OUTPATIENT)
Dept: PHYSICAL THERAPY | Facility: REHABILITATION | Age: 42
End: 2022-11-02

## 2022-11-02 DIAGNOSIS — M25.512 ACUTE PAIN OF LEFT SHOULDER: ICD-10-CM

## 2022-11-02 DIAGNOSIS — M54.12 CERVICAL RADICULOPATHY: Primary | ICD-10-CM

## 2022-11-02 NOTE — PROGRESS NOTES
Daily Note     Today's date: 2022  Patient name: Denny Hidalgo  : 1980  MRN: [id number]  Referring provider: Devon Lopez PT  Dx:   Encounter Diagnosis     ICD-10-CM    1  Cervical radiculopathy  M54 12    2  Acute pain of left shoulder  M25 512                   Subjective:  Patient reports that he felt better after his PT last evening but it only lasted about an hour and then the pain resumed  Patient reports that he is very frustrated because his MRI was cancelled due to insurance reasons  Objective: See treatment diary below      Assessment: Patient tolerated treatment well  Patient performed ex and received manual therapy as noted  + tenderness reported per patient over L clavicle and AC joint  No significant cervical soft tissue restriction noted today  Patient did appear to tolerate exercise better today as well  Patient would benefit from continued PT intervention to address deficits and attain set goals  Plan: Continue per plan of care        Precautions: anxiety, asthma, DM, GERD, HTN     Daily Treatment Diary      Assessment 10/24 10/27 10/31 11/1 11/2   10/11 10/13 10/18   Eval/Reval               FOTO            performed   Manuals    L 1st Rib Mob CC glides TE glides TE glides CC  glides CC glides    CC glide  TE glides     L UT & LS Stretch and STM CC TE TE CC CC   CC TE JG    L Cervical Opening Mobs C3-C6        CC TE     Cervical Traction CC TE   CC   CC TE JG    PA Mobs T1-T6         Glides CC     Exercise Diary     Supine Chin Tucks              Upper Thoracic Extension Stretch - Rolled Towel 1'x2 /c foam  x1' 1'x2 /c foam 1'x2  c foam 1'x2  c foam 1'x2  c foam     1/2 foam x1'  1/2 foam x1'  1/2 foam 5"x15    Supine 1/2 Foam Roll Pec Stretch 1/2 foam pec stretch, swimmers, punches x1' ea 1/2 foam pec stretch, swimmers, punches x1' ea 1/2 foam pec stretch, swimmers, punches x1' ea 1/2 foam pec stretch, swimmers,punches,hugs  x1' ea 1/2 foam  pec stretch, swimmers, hugs, punches x1' ea    1/2 foam  Series swimmer/punches 1' ea  1/2 foam  Series swimmer/punches 1' ea 1/2 foam pec stretch 1' x3    Seated UT & LS Stretches 20"x3  Ea B 20"x3  Ea B 30"x3     20"x3  L 20"x3  L     Biceps/Pec Wall Stretch             Doorway Pec Stretch   30"x3 30"x3 30"x3          Prone Scapular Retractions 5" 2x10 5"2x10 5"2x10 5"2x10 5"2x10    5"x10  5"x10 5"x20   UBE - Retro 5' retro 5' retro 3' ea 3'/3' 3'/3'    x5'  x5' x5' retro   TB rows   GTB 5"x10 gtb  5" 2x10 btb  5" 2x10           TB Ext   GTB 5"x10 gtb  5" 2x10 btb  5" 2x10           B ER     otb  5" x10 otb  5" x15                                                                                          Modalities    MHP C-T Spine  90/90  Pre-Tx 10'  np dc     10'  10'

## 2022-11-03 ENCOUNTER — APPOINTMENT (OUTPATIENT)
Dept: PHYSICAL THERAPY | Facility: REHABILITATION | Age: 42
End: 2022-11-03

## 2022-11-07 ENCOUNTER — OFFICE VISIT (OUTPATIENT)
Dept: PHYSICAL THERAPY | Facility: REHABILITATION | Age: 42
End: 2022-11-07

## 2022-11-07 DIAGNOSIS — M25.512 ACUTE PAIN OF LEFT SHOULDER: ICD-10-CM

## 2022-11-07 DIAGNOSIS — M54.12 CERVICAL RADICULOPATHY: Primary | ICD-10-CM

## 2022-11-07 NOTE — PROGRESS NOTES
Daily Note     Today's date: 2022  Patient name: Jami Gaytan  : 1980  MRN: [id number]  Referring provider: Lacey Santacruz, PT  Dx:   Encounter Diagnosis     ICD-10-CM    1  Cervical radiculopathy  M54 12    2  Acute pain of left shoulder  M25 512                   Subjective:  Adis Lopez reports no change in his symptoms  Objective: See treatment diary below      Assessment: Patient tolerated treatment well  Patient performed ex and received manual therapy as noted  Provided cues to ensure good ex technique and benefit  Patient would benefit from continued PT intervention to address deficits and attain set goals  Plan: Continue per plan of care        Precautions: anxiety, asthma, DM, GERD, HTN     Daily Treatment Diary      Assessment 10/24 10/27 10/31 11/1 11/2 11/7  10/11 10/13 10/18   Eval/Reval               FOTO            performed   Manuals    L 1st Rib Mob CC glides TE glides TE glides CC  glides CC glides CC glides   CC glide  TE glides     L UT & LS Stretch and STM CC TE TE CC CC CC  CC TE JG    L Cervical Opening Mobs C3-C6        CC TE     Cervical Traction CC TE   CC   CC TE JG    PA Mobs T1-T6         Glides CC     Exercise Diary     Supine Chin Tucks              Upper Thoracic Extension Stretch - Rolled Towel 1'x2 /c foam  x1' 1'x2 /c foam 1'x2  c foam 1'x2  c foam 1'x2  c foam 1'x2 c foam    1/2 foam x1'  1/2 foam x1'  1/2 foam 5"x15    Supine 1/2 Foam Roll Pec Stretch 1/2 foam pec stretch, swimmers, punches x1' ea 1/2 foam pec stretch, swimmers, punches x1' ea 1/2 foam pec stretch, swimmers, punches x1' ea 1/2 foam pec stretch, swimmers,punches,hugs  x1' ea 1/2 foam  pec stretch, swimmers, hugs, punches x1' ea 1/2 foam pec stretch x2', swimmers, hugs, punches x1' ea   1/2 foam  Series swimmer/punches 1' ea  1/2 foam  Series swimmer/punches 1' ea 1/2 foam pec stretch 1' x3    Seated UT & LS Stretches 20"x3  Ea B 20"x3  Ea B 30"x3     20"x3  L 20"x3  L     Biceps/Pec Wall Stretch Doorway Pec Stretch   30"x3 30"x3 30"x3 30"x3         Prone Scapular Retractions 5" 2x10 5"2x10 5"2x10 5"2x10 5"2x10 5" 2x10   5"x10  5"x10 5"x20   UBE - Retro 5' retro 5' retro 3' ea 3'/3' 3'/3' 3'/3'   x5'  x5' x5' retro   TB rows   GTB 5"x10 gtb  5" 2x10 btb  5" 2x10 btb  5" 2x10          TB Ext   GTB 5"x10 gtb  5" 2x10 btb  5" 2x10 btb  5" 2x10          B ER     otb  5" x10 otb  5" x15 otb  5"2x10          prone T      5"x10                                                                         Modalities    MHP C-T Spine  90/90  Pre-Tx 10'  np dc     10'  10'

## 2022-11-08 ENCOUNTER — APPOINTMENT (OUTPATIENT)
Dept: PHYSICAL THERAPY | Facility: REHABILITATION | Age: 42
End: 2022-11-08

## 2022-11-09 ENCOUNTER — OFFICE VISIT (OUTPATIENT)
Dept: PHYSICAL THERAPY | Facility: REHABILITATION | Age: 42
End: 2022-11-09

## 2022-11-09 DIAGNOSIS — M54.12 CERVICAL RADICULOPATHY: Primary | ICD-10-CM

## 2022-11-09 DIAGNOSIS — M25.512 ACUTE PAIN OF LEFT SHOULDER: ICD-10-CM

## 2022-11-09 NOTE — PROGRESS NOTES
Daily Note     Today's date: 2022  Patient name: Carlita Mcdaniels  : 1980  MRN: [id number]  Referring provider: Jaquan Wilcox, PT  Dx:   Encounter Diagnosis     ICD-10-CM    1  Cervical radiculopathy  M54 12    2  Acute pain of left shoulder  M25 512                   Subjective:  Erasto Yen reports that he is not bad today  Objective: See treatment diary below      Assessment: Patient tolerated treatment well  Patient performed ex and received manual therapy as noted  Patient is tolerating strengthening ex better and did note that he is noticing improved strength  L shoulder ROM screen- end range restriction is noted  + tenderness reported over L AC joint  Patient would benefit from continued PT intervention to address deficits and attain set goals  Plan: Continue per plan of care        Precautions: anxiety, asthma, DM, GERD, HTN     Daily Treatment Diary      Assessment 10/24 10/27 10/31 11/1 11/2 11/7 11/9  10/13 10/18   Eval/Reval               FOTO            performed   Manuals    L 1st Rib Mob CC glides TE glides TE glides CC  glides CC glides CC glides CC glides   TE glides     L UT & LS Stretch and STM CC TE TE CC CC CC CC  TE JG    L Cervical Opening Mobs C3-C6       L shoulder ROM screen  AC joint glide  TE     Cervical Traction CC TE   CC CC CC  TE JG    PA Mobs T1-T6              Exercise Diary     Supine Chin Tucks              Upper Thoracic Extension Stretch - Rolled Towel 1'x2 /c foam  x1' 1'x2 /c foam 1'x2  c foam 1'x2  c foam 1'x2  c foam 1'x2 c foam   1'x2  c foam   1/2 foam x1'  1/2 foam 5"x15    Supine 1/2 Foam Roll Pec Stretch 1/2 foam pec stretch, swimmers, punches x1' ea 1/2 foam pec stretch, swimmers, punches x1' ea 1/2 foam pec stretch, swimmers, punches x1' ea 1/2 foam pec stretch, swimmers,punches,hugs  x1' ea 1/2 foam  pec stretch, swimmers, hugs, punches x1' ea 1/2 foam pec stretch x2', swimmers, hugs, punches x1' ea 1/2 foam  pec str 2x1',  swimmer,hugs,puches,  hugs 2x1' ea   1/2 foam  Series swimmer/punches 1' ea 1/2 foam pec stretch 1' x3    Seated UT & LS Stretches 20"x3  Ea B 20"x3  Ea B 30"x3      20"x3  L     Biceps/Pec Wall Stretch             Doorway Pec Stretch   30"x3 30"x3 30"x3 30"x3 30"x3        Prone Scapular Retractions 5" 2x10 5"2x10 5"2x10 5"2x10 5"2x10 5" 2x10 5" 2x10   5"x10 5"x20   UBE - Retro 5' retro 5' retro 3' ea 3'/3' 3'/3' 3'/3' 3'/3'   x5' x5' retro   TB rows   GTB 5"x10 gtb  5" 2x10 btb  5" 2x10 btb  5" 2x10 btb  5" 2x10         TB Ext   GTB 5"x10 gtb  5" 2x10 btb  5" 2x10 btb  5" 2x10 btb  5"2x10         B ER     otb  5" x10 otb  5" x15 otb  5"2x10 otb  5"2x10         prone T      5"x10 5" 2x10                                                                        Modalities    MHP C-T Spine  90/90  Pre-Tx 10'  np dc      10'

## 2022-11-10 ENCOUNTER — OFFICE VISIT (OUTPATIENT)
Dept: PHYSICAL THERAPY | Facility: REHABILITATION | Age: 42
End: 2022-11-10

## 2022-11-10 DIAGNOSIS — M54.12 CERVICAL RADICULOPATHY: Primary | ICD-10-CM

## 2022-11-10 DIAGNOSIS — M25.512 ACUTE PAIN OF LEFT SHOULDER: ICD-10-CM

## 2022-11-10 NOTE — PROGRESS NOTES
Daily Note     Today's date: 11/10/2022  Patient name: Annamarie Kulkarni  : 1980  MRN: [id number]  Referring provider: Ramone Ragland PT  Dx:   Encounter Diagnosis     ICD-10-CM    1  Cervical radiculopathy  M54 12    2  Acute pain of left shoulder  M25 512        Start Time: 1651  Stop Time: 1731  Total time in clinic (min): 40 minutes    Subjective: Patient reports he still has pain in his right clavicle that increases with right arm movement  Patient states that workers comp denied his MRI  Objective: See treatment diary below      Assessment: Patient tolerated treatment well and was able to complete all exercises with only mild complaints of pain with 1/2 foam roll series  Good form and understanding of exercises performed  No increased pain reported post treatment  Plan: Continue per plan of care        Precautions: anxiety, asthma, DM, GERD, HTN     Daily Treatment Diary      Assessment 10/24 10/27 10/31 11/1 11/2 11/7 11/9 11/10  10/18   Eval/Reval              FOTO           performed   Manuals    L 1st Rib Mob CC glides TE glides TE glides CC  glides CC glides CC glides CC glides JG glides      L UT & LS Stretch and STM CC TE TE CC CC CC CC JG  JG    L Cervical Opening Mobs C3-C6       L shoulder ROM screen  AC joint glide       Cervical Traction CC TE   CC CC CC   JG    PA Mobs T1-T6              Exercise Diary     Supine Chin Tucks              Upper Thoracic Extension Stretch - Rolled Towel 1'x2 /c foam  x1' 1'x2 /c foam 1'x2  c foam 1'x2  c foam 1'x2  c foam 1'x2 c foam   1'x2  c foam  1'x2  c foam  1/2 foam 5"x15    Supine 1/2 Foam Roll Pec Stretch 1/2 foam pec stretch, swimmers, punches x1' ea 1/2 foam pec stretch, swimmers, punches x1' ea 1/2 foam pec stretch, swimmers, punches x1' ea 1/2 foam pec stretch, swimmers,punches,hugs  x1' ea 1/2 foam  pec stretch, swimmers, hugs, punches x1' ea 1/2 foam pec stretch x2', swimmers, hugs, punches x1' ea 1/2 foam  pec str 2x1',  anastasiya baker puches,  anastasiya 2x1' ea 1/2 foam  pec str 2x1',  olivia,isaak langford,  anastasiya 2x1' ea  1/2 foam pec stretch 1' x3    Seated UT & LS Stretches 20"x3  Ea B 20"x3  Ea B 30"x3           Biceps/Pec Wall Stretch             Doorway Pec Stretch   30"x3 30"x3 30"x3 30"x3 30"x3  30"x3     Prone Scapular Retractions 5" 2x10 5"2x10 5"2x10 5"2x10 5"2x10 5" 2x10 5" 2x10 5" 2x10  5"x20   UBE - Retro 5' retro 5' retro 3' ea 3'/3' 3'/3' 3'/3' 3'/3' 6' retro  x5' retro   TB rows   GTB 5"x10 gtb  5" 2x10 btb  5" 2x10 btb  5" 2x10 btb  5" 2x10  btb 5" 20x      TB Ext   GTB 5"x10 gtb  5" 2x10 btb  5" 2x10 btb  5" 2x10 btb  5"2x10  btb 5" x20      B ER     otb  5" x10 otb  5" x15 otb  5"2x10 otb  5"2x10  gtb 5" 2x10      prone T      5"x10 5" 2x10  5" 2x10                                                                 Modalities    MHP C-T Spine  90/90  Pre-Tx 10'  np dc

## 2022-11-14 ENCOUNTER — OFFICE VISIT (OUTPATIENT)
Dept: PHYSICAL THERAPY | Facility: REHABILITATION | Age: 42
End: 2022-11-14

## 2022-11-14 DIAGNOSIS — M54.12 CERVICAL RADICULOPATHY: Primary | ICD-10-CM

## 2022-11-14 DIAGNOSIS — M25.512 ACUTE PAIN OF LEFT SHOULDER: ICD-10-CM

## 2022-11-14 NOTE — PROGRESS NOTES
Daily Note     Today's date: 2022  Patient name: Earl Inman  : 1980  MRN: [id number]  Referring provider: Zeus Mcpherson PT  Dx:   Encounter Diagnosis     ICD-10-CM    1  Cervical radiculopathy  M54 12    2  Acute pain of left shoulder  M25 512                   Subjective: pt reports less pain in neck and shoulder pre-tx  Objective: See treatment diary below      Assessment: Tolerated treatment well  Good response with exercises and manuals  Patient demonstrated fatigue post treatment, exhibited good technique with therapeutic exercises and would benefit from continued PT      Plan: Continue per plan of care  Progress treatment as tolerated         Precautions: anxiety, asthma, DM, GERD, HTN     Daily Treatment Diary      Assessment 10/24 10/27 10/31 11/1 11/2 11/7 11/9 11/10 11/14    Eval/Reval              FOTO              Manuals    L 1st Rib Mob CC glides TE glides TE glides CC  glides CC glides CC glides CC glides JG glides TE glides     L UT & LS Stretch and STM CC TE TE CC CC CC CC JG TE     L Cervical Opening Mobs C3-C6       L shoulder ROM screen  AC joint glide       Cervical Traction CC TE   CC CC CC       PA Mobs T1-T6              Exercise Diary     Supine Chin Tucks              Upper Thoracic Extension Stretch - Rolled Towel 1'x2 /c foam  x1' 1'x2 /c foam 1'x2  c foam 1'x2  c foam 1'x2  c foam 1'x2 c foam   1'x2  c foam  1'x2  c foam 1'x2  c foam     Supine 1/2 Foam Roll Pec Stretch 1/2 foam pec stretch, swimmers, punches x1' ea 1/2 foam pec stretch, swimmers, punches x1' ea 1/2 foam pec stretch, swimmers, punches x1' ea 1/2 foam pec stretch, swimmers,punches,hugs  x1' ea 1/2 foam  pec stretch, swimmers, hugs, punches x1' ea 1/2 foam pec stretch x2', swimmers, hugs, punches x1' ea 1/2 foam  pec str 2x1',  swimmer,hugs,puches,  hugs 2x1' ea 1/2 foam  pec str 2x1',  swimmer,hugs,puches,  hugs 2x1' ea 1/2 foam  pec str 2x1',  swimmer,hugs,puches,   x1' ea     Seated UT & LS Stretches 20"x3  Ea B 20"x3  Ea B 30"x3           Biceps/Pec Wall Stretch             Doorway Pec Stretch   30"x3 30"x3 30"x3 30"x3 30"x3  30"x3 30"x3    Prone Scapular Retractions 5" 2x10 5"2x10 5"2x10 5"2x10 5"2x10 5" 2x10 5" 2x10 5" 2x10 5" 2x10    UBE - Retro 5' retro 5' retro 3' ea 3'/3' 3'/3' 3'/3' 3'/3' 6' retro 6' retro    TB rows   GTB 5"x10 gtb  5" 2x10 btb  5" 2x10 btb  5" 2x10 btb  5" 2x10  btb 5" 20x btb 5" 20x     TB Ext   GTB 5"x10 gtb  5" 2x10 btb  5" 2x10 btb  5" 2x10 btb  5"2x10  btb 5" x20 btb 5" 20x     B ER     otb  5" x10 otb  5" x15 otb  5"2x10 otb  5"2x10  gtb 5" 2x10 gtb 5" 2x10     prone T      5"x10 5" 2x10  5" 2x10 5" 2x10                                                                Modalities    MHP C-T Spine  90/90  Pre-Tx 10'  np dc

## 2022-11-15 ENCOUNTER — OFFICE VISIT (OUTPATIENT)
Dept: PHYSICAL THERAPY | Facility: REHABILITATION | Age: 42
End: 2022-11-15

## 2022-11-15 DIAGNOSIS — M54.12 CERVICAL RADICULOPATHY: Primary | ICD-10-CM

## 2022-11-15 DIAGNOSIS — M25.512 ACUTE PAIN OF LEFT SHOULDER: ICD-10-CM

## 2022-11-15 NOTE — PROGRESS NOTES
Daily Note     Today's date: 11/15/2022  Patient name: Kofi Brar  : 1980  MRN: [id number]  Referring provider: Jonathan Villafana, PT  Dx:   Encounter Diagnosis     ICD-10-CM    1  Cervical radiculopathy  M54 12    2  Acute pain of left shoulder  M25 512                   Subjective:  Patient reports that he is feeling about the same- the cold weather irritates it  Objective: See treatment diary below      Assessment: Patient tolerated treatment well  Patient performed ex and received manual therapy as noted  Patient demonstrated a good understanding of the exercises performed  Patient reported decreased symptoms post treatment  Patient would benefit from continued PT intervention to address deficits and attain set goals  Plan: Continue per plan of care        Precautions: anxiety, asthma, DM, GERD, HTN     Daily Treatment Diary      Assessment 10/24 10/27 10/31 11/1 11/2 11/7 11/9 11/10 11/14 11/15   Eval/Reval              FOTO              Manuals    L 1st Rib Mob CC glides TE glides TE glides CC  glides CC glides CC glides CC glides JG glides TE glides CC  glides    L UT & LS Stretch and STM CC TE TE CC CC CC CC JG TE CC    L Cervical Opening Mobs C3-C6       L shoulder ROM screen  AC joint glide       Cervical Traction CC TE   CC CC CC       PA Mobs T1-T6              Exercise Diary     Supine Chin Tucks              Upper Thoracic Extension Stretch - Rolled Towel 1'x2 /c foam  x1' 1'x2 /c foam 1'x2  c foam 1'x2  c foam 1'x2  c foam 1'x2 c foam   1'x2  c foam  1'x2  c foam 1'x2  c foam 1"x2 c foam    Supine 1/2 Foam Roll Pec Stretch 1/2 foam pec stretch, swimmers, punches x1' ea 1/2 foam pec stretch, swimmers, punches x1' ea 1/2 foam pec stretch, swimmers, punches x1' ea 1/2 foam pec stretch, swimmers,punches,hugs  x1' ea 1/2 foam  pec stretch, swimmers, hugs, punches x1' ea 1/2 foam pec stretch x2', swimmers, hugs, punches x1' ea 1/2 foam  pec str 2x1',  swimmer,hugs,puches,  hugs 2x1' ea 1/2 foam  pec str 2x1',  swimmer,hugs,puches,  hugs 2x1' ea 1/2 foam  pec str 2x1',  swimmer,hugs,puches,   x1' ea 1/2 foam pec str,  Swimmer,  Hugs, punches  x1' ea      Seated UT & LS Stretches 20"x3  Ea B 20"x3  Ea B 30"x3           Biceps/Pec Wall Stretch             Doorway Pec Stretch   30"x3 30"x3 30"x3 30"x3 30"x3  30"x3 30"x3 30"x3   Prone Scapular Retractions 5" 2x10 5"2x10 5"2x10 5"2x10 5"2x10 5" 2x10 5" 2x10 5" 2x10 5" 2x10 5" 2x10   UBE - Retro 5' retro 5' retro 3' ea 3'/3' 3'/3' 3'/3' 3'/3' 6' retro 6' retro 3'/3'   TB rows   GTB 5"x10 gtb  5" 2x10 btb  5" 2x10 btb  5" 2x10 btb  5" 2x10  btb 5" 20x btb 5" 20x btb  5"x20    TB Ext   GTB 5"x10 gtb  5" 2x10 btb  5" 2x10 btb  5" 2x10 btb  5"2x10  btb 5" x20 btb 5" 20x btb  5" x20    B ER     otb  5" x10 otb  5" x15 otb  5"2x10 otb  5"2x10  gtb 5" 2x10 gtb 5" 2x10 gtb  5"  2x10    prone T      5"x10 5" 2x10  5" 2x10 5" 2x10 5" 2x10                                                               Modalities    MHP C-T Spine  90/90  Pre-Tx 10'  np dc

## 2022-11-17 ENCOUNTER — OFFICE VISIT (OUTPATIENT)
Dept: PHYSICAL THERAPY | Facility: REHABILITATION | Age: 42
End: 2022-11-17

## 2022-11-17 DIAGNOSIS — M25.512 ACUTE PAIN OF LEFT SHOULDER: ICD-10-CM

## 2022-11-17 DIAGNOSIS — M54.12 CERVICAL RADICULOPATHY: Primary | ICD-10-CM

## 2022-11-17 NOTE — PROGRESS NOTES
Daily Note     Today's date: 2022  Patient name: Paul Price  : 1980  MRN: [id number]  Referring provider: Larry Cartwright PT  Dx:   Encounter Diagnosis     ICD-10-CM    1  Cervical radiculopathy  M54 12       2  Acute pain of left shoulder  M25 512                      Subjective: patient stated that his feeling a little better  He usually feels good when active, the pain will start when sedentary  Patient had his hearing for [facility], he might be going for an independent medical evaluation     Patient is having low back pain on the R, he thinks that it might be a kidney stone  Objective: See treatment diary below      Assessment: Tolerated treatment well  Cuing with standing tasks to avoid overactive UT, fair carryover  Favorable response to manuals, improved mobility noted post  Thoracis mobility tasks were held today at pt's request  Progress as able  Plan: Continue per plan of care         Precautions: anxiety, asthma, DM, GERD, HTN     Daily Treatment Diary      Assessment 11/17 10/27 10/31 11/1 11/2 11/7 11/9 11/10 11/14 11/15   Eval/Reval              FOTO              Manuals    L 1st Rib Mob MG TE glides TE glides CC  glides CC glides CC glides CC glides JG glides TE glides CC  glides    L UT & LS Stretch and STM MB TE TE CC CC CC CC JG TE CC    L Cervical Opening Mobs C3-C6       L shoulder ROM screen  AC joint glide       Cervical Traction  TE   CC CC CC       PA Mobs T1-T6              Exercise Diary     Supine Chin Tucks              Upper Thoracic Extension Stretch - Rolled Towel Def today 1'x2 /c foam 1'x2  c foam 1'x2  c foam 1'x2  c foam 1'x2 c foam   1'x2  c foam  1'x2  c foam 1'x2  c foam 1"x2 c foam    Supine 1/2 Foam Roll Pec Stretch  1/2 foam pec stretch, swimmers, punches x1' ea 1/2 foam pec stretch, swimmers, punches x1' ea 1/2 foam pec stretch, swimmers,punches,hugs  x1' ea 1/2 foam  pec stretch, swimmers, hugs, punches x1' ea 1/2 foam pec stretch x2', swimmers, hugs, punches x1' ea 1/2 foam  pec str 2x1',  swimmer,hugs,puches,  hugs 2x1' ea 1/2 foam  pec str 2x1',  swimmer,hugs,puches,  hugs 2x1' ea 1/2 foam  pec str 2x1',  swimmer,hugs,puches,   x1' ea 1/2 foam pec str,  Swimmer,  Hugs, punches  x1' ea      Seated UT & LS Stretches  20"x3  Ea B 30"x3           Biceps/Pec Wall Stretch             Doorway Pec Stretch 30"x3  30"x3 30"x3 30"x3 30"x3 30"x3  30"x3 30"x3 30"x3   Prone Scapular Retractions  5"2x10 5"2x10 5"2x10 5"2x10 5" 2x10 5" 2x10 5" 2x10 5" 2x10 5" 2x10   UBE - Retro 3'/3' 5' retro 3' ea 3'/3' 3'/3' 3'/3' 3'/3' 6' retro 6' retro 3'/3'   TB rows btb  5" x20  GTB 5"x10 gtb  5" 2x10 btb  5" 2x10 btb  5" 2x10 btb  5" 2x10  btb 5" 20x btb 5" 20x btb  5"x20    TB Ext btb  5" x20  GTB 5"x10 gtb  5" 2x10 btb  5" 2x10 btb  5" 2x10 btb  5"2x10  btb 5" x20 btb 5" 20x btb  5" x20    B ER  gtb  5"  2x10   otb  5" x10 otb  5" x15 otb  5"2x10 otb  5"2x10  gtb 5" 2x10 gtb 5" 2x10 gtb  5"  2x10    prone T 2x10x5"     5"x10 5" 2x10  5" 2x10 5" 2x10 5" 2x10                                                               Modalities    MHP C-T Spine  90/90  Pre-Tx   np dc

## 2022-11-21 ENCOUNTER — OFFICE VISIT (OUTPATIENT)
Dept: PHYSICAL THERAPY | Facility: REHABILITATION | Age: 42
End: 2022-11-21

## 2022-11-21 DIAGNOSIS — M25.512 ACUTE PAIN OF LEFT SHOULDER: ICD-10-CM

## 2022-11-21 DIAGNOSIS — M54.12 CERVICAL RADICULOPATHY: Primary | ICD-10-CM

## 2022-11-21 NOTE — PROGRESS NOTES
Daily Note     Today's date: 2022  Patient name: Gracia Ahuja  : 1980  MRN: [id number]  Referring provider: Traci Yan, PT  Dx:   Encounter Diagnosis     ICD-10-CM    1  Cervical radiculopathy  M54 12       2  Acute pain of left shoulder  M25 512                      Subjective: Pt reports that his pain is really bad today and has been for the past 2 days  He notes no change in activity, states "it's just a few of the bad days "       Objective: See treatment diary below       Assessment: Pt tolerated treatment well  Significant L UT and LS tightness noted on this date which was decreased post STM  L 1st rib hypomobility also noted which was mildly improved post mobilization  Pt noted decrease in symptoms post therapy on this date  Patient demonstrated fatigue post treatment, exhibited good technique with therapeutic exercises and would benefit from continued PT  Plan: Continue per plan of care  Progress treatment as tolerated         Precautions: anxiety, asthma, DM, GERD, HTN     Daily Treatment Diary      Assessment 11/17 11/21   11/2 11/7 11/9 11/10 11/14 11/15   Eval/Reval              FOTO              Manuals    L 1st Rib Mob MG ML   CC glides CC glides CC glides JG glides TE glides CC  glides    L UT & LS Stretch and STM MB ML   CC CC CC JG TE CC    L Cervical Opening Mobs C3-C6       L shoulder ROM screen  AC joint glide       Cervical Traction     CC CC CC       PA Mobs T1-T6              Exercise Diary     Supine Chin Tucks              Upper Thoracic Extension Stretch - Rolled Towel Def today  1'x1 c foam   1'x2  c foam 1'x2 c foam   1'x2  c foam  1'x2  c foam 1'x2  c foam 1"x2 c foam    Supine 1/2 Foam Roll Pec Stretch  1/2 foam  pec stretch, swimmers, hugs, punches x1' ea   1/2 foam  pec stretch, swimmers, hugs, punches x1' ea 1/2 foam pec stretch x2', swimmers, hugs, punches x1' ea 1/2 foam  pec str 2x1',  swimmer,hugs,puches,  hugs 2x1' ea 1/2 foam  pec str 2x1',  swimmer,hugs,puches,  hugs 2x1' ea 1/2 foam  pec str 2x1',  swimmer,hugs,puches,   x1' ea 1/2 foam pec str,  Swimmer,  Hugs, punches  x1' ea      Seated UT & LS Stretches              Biceps/Pec Wall Stretch             Doorway Pec Stretch 30"x3 30"x3    30"x3 30"x3 30"x3  30"x3 30"x3 30"x3   Prone Scapular Retractions     5"2x10 5" 2x10 5" 2x10 5" 2x10 5" 2x10 5" 2x10   UBE - Retro 3'/3' 3'/3'   3'/3' 3'/3' 3'/3' 6' retro 6' retro 3'/3'   TB rows btb  5" x20 btb  5" x20   btb  5" 2x10 btb  5" 2x10 btb  5" 2x10  btb 5" 20x btb 5" 20x btb  5"x20    TB Ext btb  5" x20 btb  5" x20   btb  5" 2x10 btb  5" 2x10 btb  5"2x10  btb 5" x20 btb 5" 20x btb  5" x20    B ER  gtb  5"  2x10  gtb 5" 2x10   otb  5" x15 otb  5"2x10 otb  5"2x10  gtb 5" 2x10 gtb 5" 2x10 gtb  5"  2x10    prone T 2x10x5"     5"x10 5" 2x10  5" 2x10 5" 2x10 5" 2x10                                                               Modalities    MHP C-T Spine  90/90  Pre-Tx

## 2022-11-23 ENCOUNTER — APPOINTMENT (OUTPATIENT)
Dept: PHYSICAL THERAPY | Facility: REHABILITATION | Age: 42
End: 2022-11-23

## 2022-11-25 ENCOUNTER — OFFICE VISIT (OUTPATIENT)
Dept: PHYSICAL THERAPY | Facility: REHABILITATION | Age: 42
End: 2022-11-25

## 2022-11-25 DIAGNOSIS — M54.12 CERVICAL RADICULOPATHY: Primary | ICD-10-CM

## 2022-11-25 DIAGNOSIS — M25.512 ACUTE PAIN OF LEFT SHOULDER: ICD-10-CM

## 2022-11-25 NOTE — PROGRESS NOTES
Daily Note     Today's date: 2022  Patient name: Benjamin Boggs  : 1980  MRN: [id number]  Referring provider: Jonathan Goetz, PT  Dx:   Encounter Diagnosis     ICD-10-CM    1  Cervical radiculopathy  M54 12       2  Acute pain of left shoulder  M25 512                      Subjective: Pt reports that his neck and shoulder is not bothering him too much today, notes that he was able to have a relaxed day yesterday which he thinks helped  Objective: See treatment diary below      Assessment: Pt tolerated treatment well  Continued L UT and LS tightness which was decreased post STM  Pt noted soreness in his L shoulder post-treatment on this date but stated that it was just from doing exercise and it was not increased pain  Patient demonstrated fatigue post treatment, exhibited good technique with therapeutic exercises and would benefit from continued PT  Plan: Continue per plan of care  Progress treatment as tolerated         Precautions: anxiety, asthma, DM, GERD, HTN     Daily Treatment Diary      Assessment 11/17 11/21 11/25  11/2 11/7 11/9 11/10 11/14 11/15   Eval/Reval              FOTO              Manuals    L 1st Rib Mob MG ML ML  CC glides CC glides CC glides JG glides TE glides CC  glides    L UT & LS Stretch and STM MB ML ML  CC CC CC JG TE CC    L Cervical Opening Mobs C3-C6       L shoulder ROM screen  AC joint glide       Cervical Traction     CC CC CC       PA Mobs T1-T6              Exercise Diary     Supine Chin Tucks              Upper Thoracic Extension Stretch - Rolled Towel Def today  1'x1 c foam 1'x1 c foam  1'x2  c foam 1'x2 c foam   1'x2  c foam  1'x2  c foam 1'x2  c foam 1"x2 c foam    Supine 1/2 Foam Roll Pec Stretch  1/2 foam  pec stretch, swimmers, hugs, punches x1' ea 1/2 foam  pec stretch, swimmers, hugs, punches x1' ea  1/2 foam  pec stretch, swimmers, hugs, punches x1' ea 1/2 foam pec stretch x2', swimmers, hugs, punches x1' ea 1/2 foam  pec str 2x1',  swimmer,hugs,puches,  hugs 2x1' ea 1/2 foam  pec str 2x1',  swimmer,hugs,puches,  hugs 2x1' ea 1/2 foam  pec str 2x1',  swimmer,hugs,puches,   x1' ea 1/2 foam pec str,  Swimmer,  Hugs, punches  x1' ea      Seated UT & LS Stretches              Biceps/Pec Wall Stretch             Doorway Pec Stretch 30"x3 30"x3  30"x3  30"x3 30"x3 30"x3  30"x3 30"x3 30"x3   Prone Scapular Retractions     5"2x10 5" 2x10 5" 2x10 5" 2x10 5" 2x10 5" 2x10   UBE - Retro 3'/3' 3'/3' 3'/3'  3'/3' 3'/3' 3'/3' 6' retro 6' retro 3'/3'   TB rows btb  5" x20 btb  5" x20 btb  5" x20  btb  5" 2x10 btb  5" 2x10 btb  5" 2x10  btb 5" 20x btb 5" 20x btb  5"x20    TB Ext btb  5" x20 btb  5" x20 btb  5" x20  btb  5" 2x10 btb  5" 2x10 btb  5"2x10  btb 5" x20 btb 5" 20x btb  5" x20    B ER  gtb  5"  2x10  gtb 5" 2x10 gtb 5" 2x10  otb  5" x15 otb  5"2x10 otb  5"2x10  gtb 5" 2x10 gtb 5" 2x10 gtb  5"  2x10    prone T 2x10x5"     5"x10 5" 2x10  5" 2x10 5" 2x10 5" 2x10                                                               Modalities    MHP C-T Spine  90/90  Pre-Tx

## 2022-11-29 ENCOUNTER — OFFICE VISIT (OUTPATIENT)
Dept: PHYSICAL THERAPY | Facility: REHABILITATION | Age: 42
End: 2022-11-29

## 2022-11-29 DIAGNOSIS — M54.12 CERVICAL RADICULOPATHY: Primary | ICD-10-CM

## 2022-11-29 DIAGNOSIS — M25.512 ACUTE PAIN OF LEFT SHOULDER: ICD-10-CM

## 2022-11-29 NOTE — PROGRESS NOTES
Daily Note     Today's date: 2022  Patient name: Dereje Alfred  : 1980  MRN: [id number]  Referring provider: Kun Miguel, PT  Dx:   Encounter Diagnosis     ICD-10-CM    1  Cervical radiculopathy  M54 12       2  Acute pain of left shoulder  M25 512                      Subjective: Pt reports that he is in a little bit of pain the past 2 days  Pt reports that when he left here last time he felt the same routine of his pain coming back after he relaxed  Objective: See treatment diary below      Assessment: Pt tolerated treatment well  Significant UT and LS tightness as well as 1st rib hypomobility of the L side which was improved post manuals  Patient demonstrated fatigue post treatment, exhibited good technique with therapeutic exercises and would benefit from continued PT  Plan: Continue per plan of care  Progress treatment as tolerated         Precautions: anxiety, asthma, DM, GERD, HTN     Daily Treatment Diary      Assessment 11/17 11/21 11/25 11/29   11/9 11/10 11/14 11/15   Eval/Reval              FOTO              Manuals    L 1st Rib Mob MG ML ML ML   CC glides JG glides TE glides CC  glides    L UT & LS Stretch and STM MB ML ML ML   CC JG TE CC    L Cervical Opening Mobs C3-C6       L shoulder ROM screen  AC joint glide       Cervical Traction       CC       PA Mobs T1-T6              Exercise Diary     Supine Chin Tucks              Upper Thoracic Extension Stretch - Rolled Towel Def today  1'x1 c foam 1'x1 c foam 1'x1 c foam     1'x2  c foam  1'x2  c foam 1'x2  c foam 1"x2 c foam    Supine 1/2 Foam Roll Pec Stretch  1/2 foam  pec stretch, swimmers, hugs, punches x1' ea 1/2 foam  pec stretch, swimmers, hugs, punches x1' ea 1/2 foam  pec stretch, swimmers, hugs, punches x1' ea   1/2 foam  pec str 2x1',  swimmer,hugs,puches,  hugs 2x1' ea 1/2 foam  pec str 2x1',  swimmer,hugs,puches,  hugs 2x1' ea 1/2 foam  pec str 2x1',  swimmer,hugs,puches,   x1' ea 1/2 foam pec str,  Swimmer,  Hugs, punches  x1' ea      Seated UT & LS Stretches              Biceps/Pec Wall Stretch             Doorway Pec Stretch 30"x3 30"x3  30"x3 30"x3    30"x3  30"x3 30"x3 30"x3   Prone Scapular Retractions       5" 2x10 5" 2x10 5" 2x10 5" 2x10   UBE - Retro 3'/3' 3'/3' 3'/3' 3/'3'   3'/3' 6' retro 6' retro 3'/3'   TB rows btb  5" x20 btb  5" x20 btb  5" x20 btb  5" x20   btb  5" 2x10  btb 5" 20x btb 5" 20x btb  5"x20    TB Ext btb  5" x20 btb  5" x20 btb  5" x20 btb 5"x20   btb  5"2x10  btb 5" x20 btb 5" 20x btb  5" x20    B ER  gtb  5"  2x10  gtb 5" 2x10 gtb 5" 2x10 gtb 5" 2x10   otb  5"2x10  gtb 5" 2x10 gtb 5" 2x10 gtb  5"  2x10    prone T 2x10x5"      5" 2x10  5" 2x10 5" 2x10 5" 2x10                                                               Modalities    MHP C-T Spine  90/90  Pre-Tx

## 2022-12-01 ENCOUNTER — OFFICE VISIT (OUTPATIENT)
Dept: PHYSICAL THERAPY | Facility: REHABILITATION | Age: 42
End: 2022-12-01

## 2022-12-01 DIAGNOSIS — M54.12 CERVICAL RADICULOPATHY: Primary | ICD-10-CM

## 2022-12-01 DIAGNOSIS — M25.512 ACUTE PAIN OF LEFT SHOULDER: ICD-10-CM

## 2022-12-01 NOTE — PROGRESS NOTES
Daily Note     Today's date: 2022  Patient name: Jami Gaytan  : 1980  MRN: [id number]  Referring provider: Lacey Santacruz, PT  Dx:   Encounter Diagnosis     ICD-10-CM    1  Cervical radiculopathy  M54 12       2  Acute pain of left shoulder  M25 512                      Subjective: pt reports no new complaints  Objective: See treatment diary below      Assessment: Tolerated treatment well  Pt able to complete full dosage with exercises with no reports of increased pain  Positive relief noted post treatment  Patient demonstrated fatigue post treatment, exhibited good technique with therapeutic exercises and would benefit from continued PT      Plan: Continue per plan of care  Progress treatment as tolerated         Precautions: anxiety, asthma, DM, GERD, HTN     Daily Treatment Diary      Assessment 11/17 11/21 11/25 11/29 12/1  11/9 11/10 11/14 11/15   Eval/Reval              FOTO              Manuals    L 1st Rib Mob MG ML ML ML TE  CC glides JG glides TE glides CC  glides    L UT & LS Stretch and STM MB ML ML ML TE  CC JG TE CC    L Cervical Opening Mobs C3-C6       L shoulder ROM screen  AC joint glide       Cervical Traction       CC       PA Mobs T1-T6              Exercise Diary     Supine Chin Tucks              Upper Thoracic Extension Stretch - Rolled Towel Def today  1'x1 c foam 1'x1 c foam 1'x1 c foam 1'x1 c foam    1'x2  c foam  1'x2  c foam 1'x2  c foam 1"x2 c foam    Supine 1/2 Foam Roll Pec Stretch  1/2 foam  pec stretch, swimmers, hugs, punches x1' ea 1/2 foam  pec stretch, swimmers, hugs, punches x1' ea 1/2 foam  pec stretch, swimmers, hugs, punches x1' ea 1/2 foam  pec stretch, swimmers, hugs, punches x1' ea  1/2 foam  pec str 2x1',  swimmer,hugs,puches,  hugs 2x1' ea 1/2 foam  pec str 2x1',  swimmer,hugs,puches,  hugs 2x1' ea 1/2 foam  pec str 2x1',  swimmer,hugs,puches,   x1' ea 1/2 foam pec str,  Swimmer,  Hugs, punches  x1' ea      Seated UT & LS Stretches Biceps/Pec Wall Stretch             Doorway Pec Stretch 30"x3 30"x3  30"x3 30"x3  30"x3  30"x3  30"x3 30"x3 30"x3   Prone Scapular Retractions       5" 2x10 5" 2x10 5" 2x10 5" 2x10   UBE - Retro 3'/3' 3'/3' 3'/3' 3/'3' 3' ea  3'/3' 6' retro 6' retro 3'/3'   TB rows btb  5" x20 btb  5" x20 btb  5" x20 btb  5" x20 btb  5" x20  btb  5" 2x10  btb 5" 20x btb 5" 20x btb  5"x20    TB Ext btb  5" x20 btb  5" x20 btb  5" x20 btb 5"x20 btb 5"x20  btb  5"2x10  btb 5" x20 btb 5" 20x btb  5" x20    B ER  gtb  5"  2x10  gtb 5" 2x10 gtb 5" 2x10 gtb 5" 2x10 gtb 5" 2x10  otb  5"2x10  gtb 5" 2x10 gtb 5" 2x10 gtb  5"  2x10    prone T 2x10x5"      5" 2x10  5" 2x10 5" 2x10 5" 2x10                                                               Modalities    MHP C-T Spine  90/90  Pre-Tx

## 2022-12-06 ENCOUNTER — OFFICE VISIT (OUTPATIENT)
Dept: PHYSICAL THERAPY | Facility: REHABILITATION | Age: 42
End: 2022-12-06

## 2022-12-06 DIAGNOSIS — M25.512 ACUTE PAIN OF LEFT SHOULDER: ICD-10-CM

## 2022-12-06 DIAGNOSIS — M54.12 CERVICAL RADICULOPATHY: Primary | ICD-10-CM

## 2022-12-06 NOTE — PROGRESS NOTES
Daily Note     Today's date: 2022  Patient name: Jesús Sanchez  : 1980  MRN: [id number]  Referring provider: Kerri Chiu, PT  Dx:   Encounter Diagnosis     ICD-10-CM    1  Cervical radiculopathy  M54 12       2  Acute pain of left shoulder  M25 512                      Subjective: Patient continues to have anterior left shoulder pain, paco with flexion  Objective: See treatment diary below      Assessment: Tolerated treatment well  Patient demonstrated fatigue post treatment and exhibited good technique with therapeutic exercises  Patient reported an increase in pain with 1/2 foam roll pec stretch and left shoulder flexion today  Patient reported a decrease in overall symptoms post treatment  Pt will benefit from continued skilled PT intervention in order to address his remaining limitations and to restore maximal function  Plan: Continue per plan of care        Precautions: anxiety, asthma, DM, GERD, HTN     Daily Treatment Diary      Assessment 11/17 11/21 11/25 11/29 12/1 12/6  11/10 11/14 11/15   Eval/Reval              FOTO              Manuals    L 1st Rib Mob MG ML ML ML TE JG  JG glides TE glides CC  glides    L UT & LS Stretch and STM MB ML ML ML TE JG  JG TE CC    L Cervical Opening Mobs C3-C6              Cervical Traction              PA Mobs T1-T6             Exercise Diary     Supine Chin Tucks              Upper Thoracic Extension Stretch - Rolled Towel Def today  1'x1 c foam 1'x1 c foam 1'x1 c foam 1'x1 c foam 1'x1 c foam   1'x2  c foam 1'x2  c foam 1"x2 c foam    Supine 1/2 Foam Roll Pec Stretch  1/2 foam  pec stretch, swimmers, hugs, punches x1' ea 1/2 foam  pec stretch, swimmers, hugs, punches x1' ea 1/2 foam  pec stretch, swimmers, hugs, punches x1' ea 1/2 foam  pec stretch, swimmers, hugs, punches x1' ea 1/2 foam  pec stretch, swimmers, hugs, punches x1' ea  1/2 foam  pec str 2x1',  swimmer,hugs,puches,  hugs 2x1' ea 1/2 foam  pec str 2x1',  swimmer,hugs,puches,   x1' ea 1/2 foam pec str,  Swimmer,  Hugs, punches  x1' ea      Seated UT & LS Stretches              Biceps/Pec Wall Stretch             Doorway Pec Stretch 30"x3 30"x3  30"x3 30"x3  30"x3 30"x3   30"x3 30"x3 30"x3   Prone Scapular Retractions        5" 2x10 5" 2x10 5" 2x10   UBE - Retro 3'/3' 3'/3' 3'/3' 3/'3' 3' ea 3'/3'  6' retro 6' retro 3'/3'   TB rows btb  5" x20 btb  5" x20 btb  5" x20 btb  5" x20 btb  5" x20 Purple 5"x20   btb 5" 20x btb 5" 20x btb  5"x20    TB Ext btb  5" x20 btb  5" x20 btb  5" x20 btb 5"x20 btb 5"x20 Purple 5"x20   btb 5" x20 btb 5" 20x btb  5" x20    B ER  gtb  5"  2x10  gtb 5" 2x10 gtb 5" 2x10 gtb 5" 2x10 gtb 5" 2x10 GTB 5"2x10   gtb 5" 2x10 gtb 5" 2x10 gtb  5"  2x10    prone T 2x10x5"        5" 2x10 5" 2x10 5" 2x10                                                               Modalities    MHP C-T Spine  90/90  Pre-Tx

## 2022-12-08 ENCOUNTER — OFFICE VISIT (OUTPATIENT)
Dept: PHYSICAL THERAPY | Facility: REHABILITATION | Age: 42
End: 2022-12-08

## 2022-12-08 DIAGNOSIS — M54.12 CERVICAL RADICULOPATHY: Primary | ICD-10-CM

## 2022-12-08 DIAGNOSIS — M25.512 ACUTE PAIN OF LEFT SHOULDER: ICD-10-CM

## 2022-12-08 NOTE — PROGRESS NOTES
Daily Note     Today's date: 2022  Patient name: Ana Finney  : 1980  MRN: [id number]  Referring provider: Ziggy Stoll, PT  Dx:   Encounter Diagnosis     ICD-10-CM    1  Cervical radiculopathy  M54 12       2  Acute pain of left shoulder  M25 512                  1 on 1 with PTA     Subjective: Patient reports improvement since starting PT however he continues to have a lot of pain at night  Patient uses muscle relaxers to control pain but does not get a lot of relief  Objective: See treatment diary below      Assessment: Tolerated treatment well  Occasional cues for posture and UT inhibition throughout session  Patient demonstrated fatigue post treatment, exhibited good technique with therapeutic exercises and would benefit from continued PT      Plan: Continue per plan of care  Progress treament per protocol  Precautions: anxiety, asthma, DM, GERD, HTN     Daily Treatment Diary      Assessment 11/17 11/21 11/25 11/29 12/1 12/6 12/8 11/10 11/14 11/15   Eval/Reval              FOTO              Manuals    L 1st Rib Mob MG ML ML ML TE JG  JG glides TE glides CC  glides    L UT & LS Stretch and STM MB ML ML ML TE JG  JG TE CC    L Cervical Opening Mobs C3-C6              Cervical Traction             STM/TPR       MP      L UT Stretch       MP       PA Mobs T1-T6             Exercise Diary     Supine Chin Tucks              Upper Thoracic Extension Stretch - Rolled Towel Def today  1'x1 c foam 1'x1 c foam 1'x1 c foam 1'x1 c foam 1'x1 c foam 1'x1 c foam  1'x2  c foam 1'x2  c foam 1"x2 c foam    Supine 1/2 Foam Roll Pec Stretch  1/2 foam  pec stretch, swimmers, hugs, punches x1' ea 1/2 foam  pec stretch, swimmers, hugs, punches x1' ea 1/2 foam  pec stretch, swimmers, hugs, punches x1' ea 1/2 foam  pec stretch, swimmers, hugs, punches x1' ea 1/2 foam  pec stretch, swimmers, hugs, punches x1' ea 1/2 foam pec stretch, swimmers, hugs, punches x 1' ea   1/2 foam  pec str 2x1',  swimmer,hugs,puches,  hugs 2x1' ea 1/2 foam  pec str 2x1',  swimmer,hugs,puches,   x1' ea 1/2 foam pec str,  Swimmer,  Hugs, punches  x1' ea      Seated UT & LS Stretches              Biceps/Pec Wall Stretch             Doorway Pec Stretch 30"x3 30"x3  30"x3 30"x3  30"x3 30"x3 30" x 3  30"x3 30"x3 30"x3   Prone Scapular Retractions        5" 2x10 5" 2x10 5" 2x10   UBE - Retro 3'/3' 3'/3' 3'/3' 3/'3' 3' ea 3'/3' 3'/3' 6' retro 6' retro 3'/3'   TB rows btb  5" x20 btb  5" x20 btb  5" x20 btb  5" x20 btb  5" x20 Purple 5"x20 Purple 5" x 20  btb 5" 20x btb 5" 20x btb  5"x20    TB Ext btb  5" x20 btb  5" x20 btb  5" x20 btb 5"x20 btb 5"x20 Purple 5"x20 Purple 5" x 20  btb 5" x20 btb 5" 20x btb  5" x20    B ER  gtb  5"  2x10  gtb 5" 2x10 gtb 5" 2x10 gtb 5" 2x10 gtb 5" 2x10 GTB 5"2x10 GTB 5"2 x 10   gtb 5" 2x10 gtb 5" 2x10 gtb  5"  2x10    prone T 2x10x5"        5" 2x10 5" 2x10 5" 2x10                                                               Modalities    MHP C-T Spine  90/90  Pre-Tx

## 2022-12-13 ENCOUNTER — OFFICE VISIT (OUTPATIENT)
Dept: PHYSICAL THERAPY | Facility: REHABILITATION | Age: 42
End: 2022-12-13

## 2022-12-13 DIAGNOSIS — M54.12 CERVICAL RADICULOPATHY: Primary | ICD-10-CM

## 2022-12-13 DIAGNOSIS — M25.512 ACUTE PAIN OF LEFT SHOULDER: ICD-10-CM

## 2022-12-13 NOTE — PROGRESS NOTES
Daily Note     Today's date: 2022  Patient name: Sylvie Parrish  : 1980  MRN: [id number]  Referring provider: María Savage, PT  Dx:   Encounter Diagnosis     ICD-10-CM    1  Cervical radiculopathy  M54 12       2  Acute pain of left shoulder  M25 512                      Subjective:  Patient reports that today is a bad day  He notes a lot of pain in the front of his shoulder for some reason  He denies any change in activity or incident for his increased pain  Objective: See treatment diary below      Assessment: Patient tolerated treatment well  Patient performed ex and received manual therapy as noted  + tenderness reported per patient over L proximal bicep/bicipital groove  Patient did note some pain relief post TPR  Patient overall demonstrates improved posture and ability to perform scap retraction  Patient would benefit from continued PT intervention to address deficits and attain set goals  Plan: Continue per plan of care        Precautions: anxiety, asthma, DM, GERD, HTN     Daily Treatment Diary      Assessment 11/17 11/21 11/25 11/29 12/1 12/6 12/8 12/13  11/15   Eval/Reval              FOTO              Manuals    L 1st Rib Mob MG ML ML ML TE JG    CC  glides    L UT & LS Stretch and STM MB ML ML ML TE JG    CC    L Cervical Opening Mobs C3-C6              Cervical Traction             STM/TPR       MP CC     L UT Stretch       MP CC      PA Mobs T1-T6             Exercise Diary     Supine Chin Tucks              Upper Thoracic Extension Stretch - Rolled Towel Def today  1'x1 c foam 1'x1 c foam 1'x1 c foam 1'x1 c foam 1'x1 c foam 1'x1 c foam  1'x1 c foam  1"x2 c foam    Supine 1/2 Foam Roll Pec Stretch  1/2 foam  pec stretch, swimmers, hugs, punches x1' ea 1/2 foam  pec stretch, swimmers, hugs, punches x1' ea 1/2 foam  pec stretch, swimmers, hugs, punches x1' ea 1/2 foam  pec stretch, swimmers, hugs, punches x1' ea 1/2 foam  pec stretch, swimmers, hugs, punches x1' ea 1/2 foam pec stretch, swimmers, hugs, punches x 1' ea   1/2 foam  pec stretch, hugs, swimmers, punches x1' ea  1/2 foam pec str,  Swimmer,  Hugs, punches  x1' ea      Seated UT & LS Stretches              Biceps/Pec Wall Stretch             Doorway Pec Stretch 30"x3 30"x3  30"x3 30"x3  30"x3 30"x3 30" x 3  30"x3  30"x3   Prone Scapular Retractions          5" 2x10   UBE - Retro 3'/3' 3'/3' 3'/3' 3/'3' 3' ea 3'/3' 3'/3' 3'/3'  3'/3'   TB rows btb  5" x20 btb  5" x20 btb  5" x20 btb  5" x20 btb  5" x20 Purple 5"x20 Purple 5" x 20 Purple 5" 2x10  btb  5"x20    TB Ext btb  5" x20 btb  5" x20 btb  5" x20 btb 5"x20 btb 5"x20 Purple 5"x20 Purple 5" x 20 Purple  5" 2x10  btb  5" x20    B ER  gtb  5"  2x10  gtb 5" 2x10 gtb 5" 2x10 gtb 5" 2x10 gtb 5" 2x10 GTB 5"2x10 GTB 5"2 x 10  GTB  5" 2x10  gtb  5"  2x10    prone T 2x10x5"         5" 2x10                                                               Modalities    MHP C-T Spine  90/90  Pre-Tx

## 2022-12-15 ENCOUNTER — APPOINTMENT (OUTPATIENT)
Dept: PHYSICAL THERAPY | Facility: REHABILITATION | Age: 42
End: 2022-12-15

## 2022-12-18 DIAGNOSIS — E78.5 DYSLIPIDEMIA: ICD-10-CM

## 2022-12-18 DIAGNOSIS — E66.09 CLASS 1 OBESITY DUE TO EXCESS CALORIES WITHOUT SERIOUS COMORBIDITY WITH BODY MASS INDEX (BMI) OF 33.0 TO 33.9 IN ADULT: ICD-10-CM

## 2022-12-18 DIAGNOSIS — E11.65 TYPE 2 DIABETES MELLITUS WITH HYPERGLYCEMIA, WITHOUT LONG-TERM CURRENT USE OF INSULIN (HCC): Primary | ICD-10-CM

## 2022-12-18 DIAGNOSIS — I10 ESSENTIAL HYPERTENSION: ICD-10-CM

## 2022-12-19 ENCOUNTER — TELEPHONE (OUTPATIENT)
Dept: FAMILY MEDICINE CLINIC | Facility: CLINIC | Age: 42
End: 2022-12-19

## 2022-12-19 NOTE — TELEPHONE ENCOUNTER
----- Message from [facility] sent at 12/19/2022  8:50 AM EST -----  Please schedule patient for annual physical and recommend to get blood work done  Thank you   ----- Message -----  From: Christine Hendrickson MD  Sent: 12/18/2022   9:22 PM EST  To: [facility]    Please call patient  I refilled Rx for Metformin 500 mg twice daily for DM  Recommend to get blood test done and schedule annual follow-up visit  Order placed in chart for blood work

## 2022-12-20 ENCOUNTER — OFFICE VISIT (OUTPATIENT)
Dept: PHYSICAL THERAPY | Facility: REHABILITATION | Age: 42
End: 2022-12-20

## 2022-12-20 DIAGNOSIS — M25.512 ACUTE PAIN OF LEFT SHOULDER: ICD-10-CM

## 2022-12-20 DIAGNOSIS — M54.12 CERVICAL RADICULOPATHY: Primary | ICD-10-CM

## 2022-12-20 NOTE — PROGRESS NOTES
Daily Note     Today's date: 2022  Patient name: Angela Petersen  : 1980  MRN: [id number]  Referring provider: Trever Jauregui, PT  Dx:   Encounter Diagnosis     ICD-10-CM    1  Cervical radiculopathy  M54 12       2  Acute pain of left shoulder  M25 512                      Subjective:  Patient reports that he is overall feeling much better  He reports he was able to do more at home today without exacerbation of symptoms  Objective: See treatment diary below  Assessment:  Pt with good tolerance to progression of program reporting appropriate challenge and fatigue  He reported increase in symptoms with supine-sit transfer, educated on log roll and sidelying position to assist with supine-sit transfer and reduce stress on left shoulder/clavicular region  Held upper thoracic extension stretch due to c/o left shoulder irritation  Will continue to progress program as able  Pt will benefit from continued skilled PT intervention in order to address his remaining limitations and to restore maximal function  Plan: Continue per plan of care        Precautions: anxiety, asthma, DM, GERD, HTN     Daily Treatment Diary      Assessment     Eval/Reval              FOTO            **   Manuals    L 1st Rib Mob MG ML ML ML TE JG        L UT & LS Stretch and STM MB ML ML ML TE JG   MD     L Cervical Opening Mobs C3-C6              Cervical Traction         MD    STM/TPR       MP CC MD    L UT Stretch       MP CC MD     PA Mobs T1-T6             Exercise Diary     Supine Chin Tucks              Upper Thoracic Extension Stretch - Rolled Towel Def today  1'x1 c foam 1'x1 c foam 1'x1 c foam 1'x1 c foam 1'x1 c foam 1'x1 c foam  1'x1 c foam NP     Supine 1/2 Foam Roll Pec Stretch  1/2 foam  pec stretch, swimmers, hugs, punches x1' ea 1/2 foam  pec stretch, swimmers, hugs, punches x1' ea 1/2 foam  pec stretch, swimmers, hugs, punches x1' ea 1/2 foam  pec stretch, swimmers, hugs, punches x1' ea 1/2 foam  pec stretch, swimmers, hugs, punches x1' ea 1/2 foam pec stretch, swimmers, hugs, punches x 1' ea   1/2 foam  pec stretch, hugs, swimmers, punches x1' ea 1/2 foam  pec stretch, hugs, swimmers, punches x1' ea    H/L L Pec Stretch - 1/2 Foam         2 min    H/L L Pec Stretch with LTR To R  1/2 Foam         5"  1x10     Seated UT & LS Stretches              Biceps/Pec Wall Stretch             Doorway Pec Stretch 30"x3 30"x3  30"x3 30"x3  30"x3 30"x3 30" x 3  30"x3 30"x3    Prone Scapular Retractions             UBE - Retro 3'/3' 3'/3' 3'/3' 3/'3' 3' ea 3'/3' 3'/3' 3'/3' L2  3'/3'    TB rows btb  5" x20 btb  5" x20 btb  5" x20 btb  5" x20 btb  5" x20 Purple 5"x20 Purple 5" x 20 Purple 5" 2x10      TB Ext btb  5" x20 btb  5" x20 btb  5" x20 btb 5"x20 btb 5"x20 Purple 5"x20 Purple 5" x 20 Purple  5" 2x10 Blue  5"  2x10     B ER  gtb  5"  2x10  gtb 5" 2x10 gtb 5" 2x10 gtb 5" 2x10 gtb 5" 2x10 GTB 5"2x10 GTB 5"2 x 10  GTB  5" 2x10      prone T 2x10x5"             TB H ABD          Green  5"  1x10     TB Low "W"          Orange  5"  1x10                                    Modalities    MHP C-T Spine  90/90  Pre-Tx

## 2022-12-22 ENCOUNTER — OFFICE VISIT (OUTPATIENT)
Dept: PHYSICAL THERAPY | Facility: REHABILITATION | Age: 42
End: 2022-12-22

## 2022-12-22 DIAGNOSIS — M54.12 CERVICAL RADICULOPATHY: Primary | ICD-10-CM

## 2022-12-22 DIAGNOSIS — M25.512 ACUTE PAIN OF LEFT SHOULDER: ICD-10-CM

## 2022-12-22 NOTE — PROGRESS NOTES
Daily Note     Today's date: 2022  Patient name: Latisha Bentley  : 1980  MRN: [id number]  Referring provider: Matt Colon, LACIE  Dx:   Encounter Diagnosis     ICD-10-CM    1  Cervical radiculopathy  M54 12       2  Acute pain of left shoulder  M25 512                      Subjective:  Patient reports that his symptoms are a little aggravated today, may be due to the weather  Objective: See treatment diary below  Assessment:  Pt with good tolerance to progression of program reporting appropriate challenge and fatigue  Pt continues to require moderate verbal and manual cues for correct positioning and performance with all exercises  Reviewed log roll and sidelying position to assist with supine-sit transfer and reduce stress on left shoulder/clavicular region  Will continue to progress program as able  Pt will benefit from continued skilled PT intervention in order to address his remaining limitations and to restore maximal function  Plan: Continue per plan of care        Precautions: anxiety, asthma, DM, GERD, HTN     Daily Treatment Diary      Assessment    Eval/Reval              FOTO            **   Manuals    L 1st Rib Mob MG ML ML ML TE JG        L UT & LS Stretch and STM MB ML ML ML TE JG   MD GOLDSMITH    L Cervical Opening Mobs C3-C6              Cervical Traction         MD GOLDSMITH   STM/TPR       MP CC MD GOLDSMITH   L UT Stretch       MP CC MD GOLDSMITH    PA Mobs T1-T6             Exercise Diary     Cervical SNAGs for Rotation - B/L          5"x5 ea    Upper Thoracic Extension Stretch - Rolled Towel Def today  1'x1 c foam 1'x1 c foam 1'x1 c foam 1'x1 c foam 1'x1 c foam 1'x1 c foam  1'x1 c foam NP     Supine 1/2 Foam Roll Pec Stretch  1/2 foam  pec stretch, swimmers, hugs, punches x1' ea 1/2 foam  pec stretch, swimmers, hugs, punches x1' ea 1/2 foam  pec stretch, swimmers, hugs, punches x1' ea 1/2 foam  pec stretch, swimmers, hugs, punches x1' ea 1/2 foam  pec stretch, swimmers, hugs, punches x1' ea 1/2 foam pec stretch, swimmers, hugs, punches x 1' ea   1/2 foam  pec stretch, hugs, swimmers, punches x1' ea 1/2 foam  pec stretch, hugs, swimmers, punches x1' ea 1/2 foam  pec stretch, hugs, swimmers, punches x1' ea   H/L L Pec Stretch - 1/2 Foam         2 min W/chin tucks  10"x10   H/L L Pec Stretch with LTR To R  1/2 Foam         5"  1x10 10"  1x5    Seated UT & LS Stretches              Biceps/Pec Wall Stretch             Doorway Pec Stretch 30"x3 30"x3  30"x3 30"x3  30"x3 30"x3 30" x 3  30"x3 30"x3 30"x3   Prone Scapular Retractions             UBE - Retro 3'/3' 3'/3' 3'/3' 3/'3' 3' ea 3'/3' 3'/3' 3'/3' L2  3'/3' L3  3'/3'   TB rows btb  5" x20 btb  5" x20 btb  5" x20 btb  5" x20 btb  5" x20 Purple 5"x20 Purple 5" x 20 Purple 5" 2x10  Purple 5" 2x10    TB Ext btb  5" x20 btb  5" x20 btb  5" x20 btb 5"x20 btb 5"x20 Purple 5"x20 Purple 5" x 20 Purple  5" 2x10 Blue  5"  2x10 Blue  5"  2x10    B ER  gtb  5"  2x10  gtb 5" 2x10 gtb 5" 2x10 gtb 5" 2x10 gtb 5" 2x10 GTB 5"2x10 GTB 5"2 x 10  GTB  5" 2x10      prone T 2x10x5"             TB H ABD          Green  5"  1x10 Green  5"  1x10    TB Low "W"          Orange  5"  1x10   Orange  5"  1x10                                 Modalities    MHP C-T Spine  90/90  Pre-Tx

## 2022-12-27 ENCOUNTER — OFFICE VISIT (OUTPATIENT)
Dept: PHYSICAL THERAPY | Facility: REHABILITATION | Age: 42
End: 2022-12-27

## 2022-12-27 DIAGNOSIS — M54.12 CERVICAL RADICULOPATHY: Primary | ICD-10-CM

## 2022-12-27 DIAGNOSIS — M25.512 ACUTE PAIN OF LEFT SHOULDER: ICD-10-CM

## 2022-12-27 NOTE — PROGRESS NOTES
Daily Note     Today's date: 2022  Patient name: Alia Ibarra  : 1980  MRN: [id number]  Referring provider: Kam Jalloh, PT  Dx:   Encounter Diagnosis     ICD-10-CM    1  Cervical radiculopathy  M54 12       2  Acute pain of left shoulder  M25 512                      Subjective:  Patient reports that he is feeling pretty good today, overall feeling better, no adverse response to progressions made at last session  Objective: See treatment diary below  Assessment:  Pt with good tolerance to progression of program reporting appropriate challenge and fatigue  Pt continues to require moderate verbal and manual cues for correct positioning and performance with all exercises  Reviewed log roll and sidelying position to assist with supine-sit transfer and reduce stress on left shoulder/clavicular region  Will continue to progress program as able  Pt will benefit from continued skilled PT intervention in order to address his remaining limitations and to restore maximal function  Plan: Continue per plan of care        Precautions: anxiety, asthma, DM, GERD, HTN     Daily Treatment Diary      Assessment    Eval/Reval              FOTO            **   Manuals    L 1st Rib Mob  ML ML ML TE Sabrina Zapata and STM MD ML ML ML Dorothea Letters   MD MD OTTO Cervical Opening Mobs C3-C6              Cervical Traction MD MD GOLDSMITH   STM/TPR MD COTE CC MD MD Tawana Bustos MD      MP CC MD GOLDSMITH    PA Mobs T1-T6             Exercise Diary     Cervical SNAGs for Rotation - B/L NV **        5"x5 ea    Upper Thoracic Extension Stretch - Rolled Towel NP  1'x1 c foam 1'x1 c foam 1'x1 c foam 1'x1 c foam 1'x1 c foam 1'x1 c foam  1'x1 c foam NP     Supine 1/2 Foam Roll Pec Stretch 1/2 foam  pec stretch, hugs, swimmers, punches x1' ea 1/2 foam  pec stretch, swimmers, hugs, punches x1' ea 1/2 foam  pec stretch, swimmers, hugs, punches x1' ea 1/2 foam  pec stretch, swimmers, hugs, punches x1' ea 1/2 foam  pec stretch, swimmers, hugs, punches x1' ea 1/2 foam  pec stretch, swimmers, hugs, punches x1' ea 1/2 foam pec stretch, swimmers, hugs, punches x 1' ea   1/2 foam  pec stretch, hugs, swimmers, punches x1' ea 1/2 foam  pec stretch, hugs, swimmers, punches x1' ea 1/2 foam  pec stretch, hugs, swimmers, punches x1' ea   H/L L Pec Stretch - 1/2 Foam W/chin tucks  10"x10        2 min W/chin tucks  10"x10   H/L L Pec Stretch with LTR To R  1/2 Foam 10"  1x5        5"  1x10 10"  1x5    Seated UT & LS Stretches              Biceps/Pec Wall Stretch             Doorway Pec Stretch 30"x3 30"x3  30"x3 30"x3  30"x3 30"x3 30" x 3  30"x3 30"x3 30"x3   Prone Scapular Retractions             UBE - Retro L5  3'/3' 3'/3' 3'/3' 3/'3' 3' ea 3'/3' 3'/3' 3'/3' L2  3'/3' L3  3'/3'   TB rows Purple  5"   2x10 btb  5" x20 btb  5" x20 btb  5" x20 btb  5" x20 Purple 5"x20 Purple 5" x 20 Purple 5" 2x10  Purple 5" 2x10    TB Ext Blue  5"  2x10 btb  5" x20 btb  5" x20 btb 5"x20 btb 5"x20 Purple 5"x20 Purple 5" x 20 Purple  5" 2x10 Blue  5"  2x10 Blue  5"  2x10    B ER    gtb 5" 2x10 gtb 5" 2x10 gtb 5" 2x10 gtb 5" 2x10 GTB 5"2x10 GTB 5"2 x 10  GTB  5" 2x10                    TB H ABD Green  5"  1x15         Green  5"  1x10 Green  5"  1x10    TB Low "W" Orange  5"  1x10         Orange  5"  1x10   Orange  5"  1x10                                 Modalities    MHP C-T Spine  90/90  Pre-Tx

## 2022-12-29 ENCOUNTER — APPOINTMENT (OUTPATIENT)
Dept: PHYSICAL THERAPY | Facility: REHABILITATION | Age: 42
End: 2022-12-29

## 2023-01-03 ENCOUNTER — OFFICE VISIT (OUTPATIENT)
Dept: PHYSICAL THERAPY | Facility: REHABILITATION | Age: 43
End: 2023-01-03

## 2023-01-03 DIAGNOSIS — M54.12 CERVICAL RADICULOPATHY: Primary | ICD-10-CM

## 2023-01-03 DIAGNOSIS — M25.512 ACUTE PAIN OF LEFT SHOULDER: ICD-10-CM

## 2023-01-03 NOTE — PROGRESS NOTES
Daily Note     Today's date: 1/3/2023  Patient name: Veena Cartagena  : 1980  MRN: [id number]  Referring provider: Irma Richardson, PT  Dx:   Encounter Diagnosis     ICD-10-CM    1  Cervical radiculopathy  M54 12       2  Acute pain of left shoulder  M25 512                      Subjective:  Patient reports that he is feeling pretty good today, overall feeling better, no adverse response to progressions made at last session  Objective: See treatment diary below  Assessment:  Pt with good tolerance to progression of program reporting appropriate challenge and fatigue  Pt continues to require moderate verbal and manual cues for correct positioning and performance with all exercises  Reviewed log roll and sidelying position to assist with supine-sit transfer and reduce stress on left shoulder/clavicular region  Will continue to progress program as able  Pt will benefit from continued skilled PT intervention in order to address his remaining limitations and to restore maximal function  Plan: Continue per plan of care        Precautions: anxiety, asthma, DM, GERD, HTN     Daily Treatment Diary      Assessment 1/3            Eval/Reval             FOTO             Manuals    L 1st Rib Mob              L UT & LS Stretch and STM MD             L Cervical Opening Mobs C3-C6              Cervical Traction MD            STM/TPR MD            L UT Stretch MD             PA Mobs T1-T6             Exercise Diary     Cervical SNAGs for Rotation - B/L NV **            Upper Thoracic Extension Stretch - Rolled Towel NP             Supine 1/2 Foam Roll Pec Stretch 1/2 foam  pec stretch, hugs, swimmers, punches x1' ea            H/L L Pec Stretch - 1/2 Foam W/chin tucks  10"x10            H/L L Pec Stretch with LTR To R  1/2 Foam 10"  1x5             Seated UT & LS Stretches              Biceps/Pec Wall Stretch             Doorway Pec Stretch 30"x3            Prone Scapular Retractions             UBE - Retro L5  3'/3' TB rows Purple  5"   2x10             TB Ext Blue  5"  2x10             B ER                             TB H ABD Green  5"  1x15             TB Low "W" Orange  5"  1x10                                        Modalities    MHP C-T Spine  90/90  Pre-Tx

## 2023-01-05 ENCOUNTER — OFFICE VISIT (OUTPATIENT)
Dept: PHYSICAL THERAPY | Facility: REHABILITATION | Age: 43
End: 2023-01-05

## 2023-01-05 DIAGNOSIS — M54.12 CERVICAL RADICULOPATHY: Primary | ICD-10-CM

## 2023-01-05 DIAGNOSIS — M25.512 ACUTE PAIN OF LEFT SHOULDER: ICD-10-CM

## 2023-01-05 NOTE — PROGRESS NOTES
Daily Note     Today's date: 2023  Patient name: Pieter Lanza  : 1980  MRN: [id number]  Referring provider: Puma Vasquez, PT  Dx:   Encounter Diagnosis     ICD-10-CM    1  Cervical radiculopathy  M54 12       2  Acute pain of left shoulder  M25 512                      Subjective:  Patient reports he is feeling good today, has some muscle soreness from progressions made last visit  Objective: See treatment diary below  Assessment:  Pt tolerated treatment well  Occ cuing t/o treatment for proper form with TB strengthening with good pt carryover  No increased pain or adverse symptoms with TE completion  Pt receives manuals well, favorable response noted post treatment with slight decreased soreness  Pt would benefit from continued skilled PT to improve current deficits and maximize function  Plan: Continue per plan of care        Precautions: anxiety, asthma, DM, GERD, HTN     Daily Treatment Diary      Assessment 1/3 1/5           Eval/Reval             FOTO             Manuals    L 1st Rib Mob              L UT & LS Stretch and STM MD JOAQUIN            L Cervical Opening Mobs C3-C6              Cervical Traction MD JOAQUIN           STM/TPR MD JOAQUIN           L UT Stretch MD JOAQUIN            PA Mobs T1-T6             Exercise Diary     Cervical SNAGs for Rotation - B/L NV 5x10"            Upper Thoracic Extension Stretch - Rolled Towel NP             Supine 1/2 Foam Roll Pec Stretch 1/2 foam  pec stretch, hugs, swimmers, punches x1' ea 1/2 foam pec stretch, hugs, swimmers, punches x1' ea           H/L L Pec Stretch - 1/2 Foam W/chin tucks  10"x10            H/L L Pec Stretch with LTR To R  1/2 Foam 10"  1x5 10"  1x5            Seated UT & LS Stretches              Biceps/Pec Wall Stretch             Doorway Pec Stretch 30"x3 30"x3           Prone Scapular Retractions             UBE - Retro L5  3'/3' L5 3'/3'           TB rows Purple  5"   2x10 purple 5" 2x10            TB Ext Blue  5"  2x10 Blue  5"  2x10 B ER                             TB H ABD Green  5"  1x15 Green  5"  1x15            TB Low "W" Orange  5"  1x10 orange 1x10                                       Modalities    MHP C-T Spine  90/90  Pre-Tx

## 2023-01-10 ENCOUNTER — OFFICE VISIT (OUTPATIENT)
Dept: PHYSICAL THERAPY | Facility: REHABILITATION | Age: 43
End: 2023-01-10

## 2023-01-10 DIAGNOSIS — M25.512 ACUTE PAIN OF LEFT SHOULDER: ICD-10-CM

## 2023-01-10 DIAGNOSIS — M54.12 CERVICAL RADICULOPATHY: Primary | ICD-10-CM

## 2023-01-10 NOTE — PROGRESS NOTES
Daily Note     Today's date: 1/10/2023  Patient name: Kofi Brar  : 1980  MRN: [id number]  Referring provider: Jonathan Villafana, PT  Dx:   Encounter Diagnosis     ICD-10-CM    1  Cervical radiculopathy  M54 12       2  Acute pain of left shoulder  M25 512                      Subjective:  Patient reports he had a little soreness after last visit along his UT  Objective: See treatment diary below  Assessment:  Pt tolerated treatment well with progressions this visit  Pt demonstrates appropriate fatigue with TB strengthening, no increased pain  Pt would benefit from continued skilled PT to improve current deficits and maximize function  Plan: Continue per plan of care        Precautions: anxiety, asthma, DM, GERD, HTN     Daily Treatment Diary      Assessment 1/3 1/5 1/10          Eval/Reval             FOTO             Manuals    L 1st Rib Mob              L UT & LS Stretch and STM MD [facility]           L Cervical Opening Mobs C3-C6              Cervical Traction MD [facility]          STM/TPR MD [facility]          L UT Stretch MD [facility]           PA Mobs T1-T6             Exercise Diary     Cervical SNAGs for Rotation - B/L NV 5x10" 5x10"           Upper Thoracic Extension Stretch - Rolled Towel NP             Supine 1/2 Foam Roll Pec Stretch 1/2 foam  pec stretch, hugs, swimmers, punches x1' ea 1/2 foam pec stretch, hugs, swimmers, punches x1' ea 1/2 foam pec stretch, hugs, swimmers, puches x1'          H/L L Pec Stretch - 1/2 Foam W/chin tucks  10"x10  w/chin tucks 10"x10          H/L L Pec Stretch with LTR To R  1/2 Foam 10"  1x5 10"  1x5 10  1x5           Seated UT & LS Stretches              Biceps/Pec Wall Stretch             Doorway Pec Stretch 30"x3 30"x3 30"x3          Prone Scapular Retractions             UBE - Retro L5  3'/3' L5 3'/3' L5  3'/3'          TB rows Purple  5"   2x10 purple 5" 2x10 purple 5"  2x10           TB Ext Blue  5"  2x10 Blue  5"  2x10 Blue   5" 2x10           B ER TB H ABD Green  5"  1x15 Green  5"  1x15 Green  5"  1x15           TB Low "W" Orange  5"  1x10 orange 1x10 Pink  1x10                                      Modalities    MHP C-T Spine  90/90  Pre-Tx

## 2023-01-12 ENCOUNTER — OFFICE VISIT (OUTPATIENT)
Dept: PHYSICAL THERAPY | Facility: REHABILITATION | Age: 43
End: 2023-01-12

## 2023-01-12 DIAGNOSIS — M25.512 ACUTE PAIN OF LEFT SHOULDER: ICD-10-CM

## 2023-01-12 DIAGNOSIS — M54.12 CERVICAL RADICULOPATHY: Primary | ICD-10-CM

## 2023-01-12 NOTE — PROGRESS NOTES
Daily Note     Today's date: 2023  Patient name: Tai Fabian  : 1980  MRN: [id number]  Referring provider: Odette Salinas, PT  Dx:   Encounter Diagnosis     ICD-10-CM    1  Cervical radiculopathy  M54 12       2  Acute pain of left shoulder  M25 512                      Subjective:  Patient with no new complaints since last session  Objective: See treatment diary below  Assessment:  Pt with good tolerance to progression of program reporting appropriate challenge and fatigue  Pt continues to require moderate verbal and manual cues for correct positioning and performance with all exercises  Reviewed log roll and sidelying position to assist with supine-sit transfer and reduce stress on left shoulder/clavicular region  Will continue to progress program as able  Pt will benefit from continued skilled PT intervention in order to address his remaining limitations and to restore maximal function  Plan: Continue per plan of care        Precautions: anxiety, asthma, DM, GERD, HTN     Daily Treatment Diary      Assessment 1/3 1/5 1/10 1/12         Eval/Reval             FOTO             Manuals    L 1st Rib Mob              L UT & LS Stretch and STM MD [facility] MD          L Cervical Opening Mobs C3-C6              Cervical Traction MD [facility] MD         STM/TPR MD SC EMANI OTTO UT Stretch MD [facility] MD          PA Mobs T1-T6             Exercise Diary     Cervical SNAGs for Rotation - B/L NV 5x10" 5x10"           Upper Thoracic Extension Stretch - Rolled Towel NP             Supine 1/2 Foam Roll Pec Stretch 1/2 foam  pec stretch, hugs, swimmers, punches x1' ea 1/2 foam pec stretch, hugs, swimmers, punches x1' ea 1/2 foam pec stretch, hugs, swimmers, puches x1' 1/2 foam pec stretch, hugs, swimmers, punches x1' ea         H/L L Pec Stretch - 1/2 Foam W/chin tucks  10"x10  w/chin tucks 10"x10 w/chin tucks 10"x10         H/L L Pec Stretch with LTR To R  1/2 Foam 10"  1x5 10"  1x5 10  1x5 Static x 2' Seated UT & LS Stretches              Biceps/Pec Wall Stretch             Doorway Pec Stretch 30"x3 30"x3 30"x3 30"x3         Prone Scapular Retractions             UBE - Retro L5  3'/3' L5 3'/3' L5  3'/3' L5  Retro Only  6'         TB rows Purple  5"   2x10 purple 5" 2x10 purple 5"  2x10 Purple  5"   2x10          TB Ext Blue  5"  2x10 Blue  5"  2x10 Blue   5" 2x10 Blue  5"  2x10          B ER                             TB H ABD Green  5"  1x15 Green  5"  1x15 Green  5"  1x15 Green  5"  2x10          TB Low "W" Orange  5"  1x10 orange 1x10 Pink  1x10 Pink  1x10                                     Modalities    MHP C-T Spine  90/90  Pre-Tx

## 2023-01-17 ENCOUNTER — OFFICE VISIT (OUTPATIENT)
Dept: PHYSICAL THERAPY | Facility: REHABILITATION | Age: 43
End: 2023-01-17

## 2023-01-17 DIAGNOSIS — M25.512 ACUTE PAIN OF LEFT SHOULDER: ICD-10-CM

## 2023-01-17 DIAGNOSIS — M54.12 CERVICAL RADICULOPATHY: Primary | ICD-10-CM

## 2023-01-17 NOTE — PROGRESS NOTES
Daily Note     Today's date: 2023  Patient name: Osmin Blount  : 1980  MRN: [id number]  Referring provider: Iris Nunn, PT  Dx:   Encounter Diagnosis     ICD-10-CM    1  Cervical radiculopathy  M54 12       2  Acute pain of left shoulder  M25 512                      Subjective: Pete Yap reports that he did a little more on Friday and later he was in a lot of pain which lasted into Saturday  He reports that the pain around his collar bone was really intense and it seems swollen again  He reports that he occasionally has neck pain too but he feels the collar bone aggravates the other area  Objective: See treatment diary below      Assessment: Patient tolerated treatment well  Patient performed ex and received manual therapy as noted  Patient performed ex with appropriate challenge and without reports of increased pain/discomfort  Patient responded well to treatment and noted decreased soreness post treatment  Patient would benefit from continued PT intervention to address deficits and attain set goals  Plan: Continue per plan of care        Precautions: anxiety, asthma, DM, GERD, HTN     Daily Treatment Diary      Assessment 1/3 1/5 1/10 1/12 1/17        Eval/Reval             FOTO             Manuals    L 1st Rib Mob              L UT & LS Stretch and STM MD SC SC MD CC         L Cervical Opening Mobs C3-C6              Cervical Traction MD SC SC MD CC        STM/TPR MD SC SC MD CC        L UT Stretch MD SC SC MD CC         PA Mobs T1-T6             Exercise Diary     Cervical SNAGs for Rotation - B/L NV 5x10" 5x10"           Upper Thoracic Extension Stretch - Rolled Towel NP             Supine 1/2 Foam Roll Pec Stretch 1/2 foam  pec stretch, hugs, swimmers, punches x1' ea 1/2 foam pec stretch, hugs, swimmers, punches x1' ea 1/2 foam pec stretch, hugs, swimmers, puches x1' 1/2 foam pec stretch, hugs, swimmers, punches x1' ea 1/2 foam, pec stretch, hugs, swimmers, punches  x1'        H/L L Pec Stretch - 1/2 Foam W/chin tucks  10"x10  w/chin tucks 10"x10 w/chin tucks 10"x10 W/ chin tuck   10"x10        H/L L Pec Stretch with LTR To R  1/2 Foam 10"  1x5 10"  1x5 10  1x5 Static x 2'   Static  x2'         Seated UT & LS Stretches              Biceps/Pec Wall Stretch             Doorway Pec Stretch 30"x3 30"x3 30"x3 30"x3 30"x3        Prone Scapular Retractions             UBE - Retro L5  3'/3' L5 3'/3' L5  3'/3' L5  Retro Only  6' L5 retro  x6'         TB rows Purple  5"   2x10 purple 5" 2x10 purple 5"  2x10 Purple  5"   2x10 Purple  5" 2x10         TB Ext Blue  5"  2x10 Blue  5"  2x10 Blue   5" 2x10 Blue  5"  2x10 Blue  5" 2x10         B ER                             TB H ABD Green  5"  1x15 Green  5"  1x15 Green  5"  1x15 Green  5"  2x10 Green  5" 2x10         TB Low "W" Orange  5"  1x10 orange 1x10 Pink  1x10 Pink  1x10 Pink  1x12                                    Modalities    MHP C-T Spine  90/90  Pre-Tx

## 2023-01-19 ENCOUNTER — APPOINTMENT (OUTPATIENT)
Dept: PHYSICAL THERAPY | Facility: REHABILITATION | Age: 43
End: 2023-01-19

## 2023-01-23 ENCOUNTER — OFFICE VISIT (OUTPATIENT)
Dept: PHYSICAL THERAPY | Facility: REHABILITATION | Age: 43
End: 2023-01-23

## 2023-01-23 DIAGNOSIS — M54.12 CERVICAL RADICULOPATHY: Primary | ICD-10-CM

## 2023-01-23 DIAGNOSIS — M25.512 ACUTE PAIN OF LEFT SHOULDER: ICD-10-CM

## 2023-01-23 NOTE — PROGRESS NOTES
Daily Note     Today's date: 2023  Patient name: Benigno Honeycutt  : 1980  MRN: [id number]  Referring provider: Sonali Thakkar, PT  Dx:   Encounter Diagnosis     ICD-10-CM    1  Cervical radiculopathy  M54 12       2  Acute pain of left shoulder  M25 512                      Subjective:  Patient reports that he is not too bad today  He hasn't done as much today so far so OK  Objective: See treatment diary below      Assessment: Patient tolerated treatment well  Patient performed ex and received manual therapy as noted  Patient presents with decreased subjective complaints of pain today  Decreased L cervical soft tissue restriction noted today  Continue to provide cues to improve posture and scapular retraction during theraband strengthening  Patient would benefit from continued PT intervention to address deficits and attain set goals  Plan: Continue per plan of care        Precautions: anxiety, asthma, DM, GERD, HTN     Daily Treatment Diary      Assessment 1/3 1/5 1/10 1/12 1/17 1/23       Eval/Reval             FOTO             Manuals    L 1st Rib Mob              L UT & LS Stretch and STM MD SC SC MD CC CC        L Cervical Opening Mobs C3-C6              Cervical Traction MD SC SC MD CC CC       STM/TPR MD SC SC MD CC CC       L UT Stretch MD [facility] MD CC CC        PA Mobs T1-T6             Exercise Diary     Cervical SNAGs for Rotation - B/L NV 5x10" 5x10"           Upper Thoracic Extension Stretch - Rolled Towel NP             Supine 1/2 Foam Roll Pec Stretch 1/2 foam  pec stretch, hugs, swimmers, punches x1' ea 1/2 foam pec stretch, hugs, swimmers, punches x1' ea 1/2 foam pec stretch, hugs, swimmers, puches x1' 1/2 foam pec stretch, hugs, swimmers, punches x1' ea 1/2 foam, pec stretch, hugs, swimmers, punches  x1' 1/2 foam, pec stretch,hugs, swimmers,punches x1'       H/L L Pec Stretch - 1/2 Foam W/chin tucks  10"x10  w/chin tucks 10"x10 w/chin tucks 10"x10 W/ chin tuck   10"x10 W/ chin tuck 10"x10       H/L L Pec Stretch with LTR To R  1/2 Foam 10"  1x5 10"  1x5 10  1x5 Static x 2'   Static  x2' Static x2'        Seated UT & LS Stretches              Biceps/Pec Wall Stretch             Doorway Pec Stretch 30"x3 30"x3 30"x3 30"x3 30"x3 30"x3       Prone Scapular Retractions             UBE - Retro L5  3'/3' L5 3'/3' L5  3'/3' L5  Retro Only  6' L5 retro  x6'  L5 retro   x6'       TB rows Purple  5"   2x10 purple 5" 2x10 purple 5"  2x10 Purple  5"   2x10 Purple  5" 2x10 Purple  5" 2x10        TB Ext Blue  5"  2x10 Blue  5"  2x10 Blue   5" 2x10 Blue  5"  2x10 Blue  5" 2x10 Blue  5" 2x10        B ER                             TB H ABD Green  5"  1x15 Green  5"  1x15 Green  5"  1x15 Green  5"  2x10 Green  5" 2x10 Green  5" 2x10        TB Low "W" Orange  5"  1x10 orange 1x10 Pink  1x10 Pink  1x10 Pink  1x12 Green  1x10                                   Modalities    MHP C-T Spine  90/90  Pre-Tx

## 2023-01-24 ENCOUNTER — APPOINTMENT (OUTPATIENT)
Dept: PHYSICAL THERAPY | Facility: REHABILITATION | Age: 43
End: 2023-01-24

## 2023-01-26 ENCOUNTER — OFFICE VISIT (OUTPATIENT)
Dept: PHYSICAL THERAPY | Facility: REHABILITATION | Age: 43
End: 2023-01-26

## 2023-01-26 DIAGNOSIS — M54.12 CERVICAL RADICULOPATHY: Primary | ICD-10-CM

## 2023-01-26 DIAGNOSIS — M25.512 ACUTE PAIN OF LEFT SHOULDER: ICD-10-CM

## 2023-01-26 NOTE — PROGRESS NOTES
Daily Note     Today's date: 2023  Patient name: Ary Flores  : 1980  MRN: [id number]  Referring provider: Abelino Alatorre PT  Dx:   Encounter Diagnosis     ICD-10-CM    1  Cervical radiculopathy  M54 12       2  Acute pain of left shoulder  M25 512                      Subjective:  Patient reports he's had a good day so far, doing better overall  He reports his WC case will be settled on 23 so will not be continuing PT after this date  Objective: See treatment diary below  Assessment: Pt with good tolerance to progression of program reporting appropriate challenge and fatigue  Pt continues to require moderate verbal and manual cues for correct positioning and performance with all exercises  Will continue to progress program as able  Pt will benefit from continued skilled PT intervention in order to address his remaining limitations and to restore maximal function  Plan: Continue per plan of care        Precautions: anxiety, asthma, DM, GERD, HTN     Daily Treatment Diary      Assessment 1/3 1/5 1/10 1/12 1/17 1/23 1/26      Eval/Reval             FOTO             Manuals    L 1st Rib Mob              L UT & LS Stretch and STM MD [facility] MD CC CC MD       L Cervical Opening Mobs C3-C6              Cervical Traction MD [facility] MD CC CC MD      STM/TPR MD SC SC MD CC CC MD      L UT Stretch MD [facility] MD CC CC MD       PA Mobs T1-T6             Exercise Diary     Cervical SNAGs for Rotation - B/L NV 5x10" 5x10"           Upper Thoracic Extension Stretch - Rolled Towel NP             Supine 1/2 Foam Roll Pec Stretch 1/2 foam  pec stretch, hugs, swimmers, punches x1' ea 1/2 foam pec stretch, hugs, swimmers, punches x1' ea 1/2 foam pec stretch, hugs, swimmers, puches x1' 1/2 foam pec stretch, hugs, swimmers, punches x1' ea 1/2 foam, pec stretch, hugs, swimmers, punches  x1' 1/2 foam, pec stretch,hugs, swimmers,punches x1' 1/2 foam, pec stretch,hugs, swimmers,punches x1'      H/L L Pec Stretch - 1/2 Foam W/chin tucks  10"x10  w/chin tucks 10"x10 w/chin tucks 10"x10 W/ chin tuck   10"x10 W/ chin tuck 10"x10 W/ chin tuck 10"x10      H/L L Pec Stretch with LTR To R  1/2 Foam 10"  1x5 10"  1x5 10  1x5 Static x 2'   Static  x2' Static x2' Static x2'       Seated UT & LS Stretches              Biceps/Pec Wall Stretch             Doorway Pec Stretch 30"x3 30"x3 30"x3 30"x3 30"x3 30"x3 30"x3      Prone Scapular Retractions             UBE - Retro L5  3'/3' L5 3'/3' L5  3'/3' L5  Retro Only  6' L5 retro  x6'  L5 retro   x6' L5 retro   x6'      TB rows Purple  5"   2x10 purple 5" 2x10 purple 5"  2x10 Purple  5"   2x10 Purple  5" 2x10 Purple  5" 2x10 Purple  5"   2x10       TB Ext Blue  5"  2x10 Blue  5"  2x10 Blue   5" 2x10 Blue  5"  2x10 Blue  5" 2x10 Blue  5" 2x10 Blue  5"   2x10       B ER                             TB H ABD Green  5"  1x15 Green  5"  1x15 Green  5"  1x15 Green  5"  2x10 Green  5" 2x10 Green  5" 2x10 Green  5"   2x10       TB Low "W" Orange  5"  1x10 orange 1x10 Pink  1x10 Pink  1x10 Pink  1x12 Green  1x10 Green  1x10                                  Modalities    MHP C-T Spine  90/90  Pre-Tx

## 2023-01-31 ENCOUNTER — OFFICE VISIT (OUTPATIENT)
Dept: PHYSICAL THERAPY | Facility: REHABILITATION | Age: 43
End: 2023-01-31

## 2023-01-31 DIAGNOSIS — M25.512 ACUTE PAIN OF LEFT SHOULDER: ICD-10-CM

## 2023-01-31 DIAGNOSIS — M54.12 CERVICAL RADICULOPATHY: Primary | ICD-10-CM

## 2023-01-31 NOTE — PROGRESS NOTES
Daily Note     Today's date: 2023  Patient name: Sarah Mena  : 1980  MRN: [id number]  Referring provider: Jhoan Ahuja, PT  Dx:   Encounter Diagnosis     ICD-10-CM    1  Cervical radiculopathy  M54 12       2  Acute pain of left shoulder  M25 512                      Subjective:  Verner Scull reports that he had been feeling pretty good but today getting out of the car he felt the pop and then pain  He reports that  was a really bad day for some reason  He is unsure if it was related to being a passenger in a car for longer than usually  He notes that the pain radiated into his jaw  Objective: See treatment diary below      Assessment: Patient tolerated treatment well  Patient performed ex and received manual therapy as noted  Patient demonstrates improved postural awareness during postural strengthening ex performed  Patient noted a positive response to treatment noting no pain post treatment  Patient would benefit from continued PT intervention to address deficits and attain set goals  Plan: Continue per plan of care        Precautions: anxiety, asthma, DM, GERD, HTN     Daily Treatment Diary      Assessment 1/3 1/5 1/10 1/12 1/17 1/23 1/26 1/31     Eval/Reval             FOTO             Manuals    L 1st Rib Mob              L UT & LS Stretch and STM MD [facility] MD CC CC MD CC      L Cervical Opening Mobs C3-C6              Cervical Traction MD [facility] MD CC CC MD CC     STM/TPR MD SC SC MD CC CC MD CC     L UT Stretch MD [facility] MD CC CC MD CC      PA Mobs T1-T6             Exercise Diary     Cervical SNAGs for Rotation - B/L NV 5x10" 5x10"           Upper Thoracic Extension Stretch - Rolled Towel NP             Supine 1/2 Foam Roll Pec Stretch 1/2 foam  pec stretch, hugs, swimmers, punches x1' ea 1/2 foam pec stretch, hugs, swimmers, punches x1' ea 1/2 foam pec stretch, hugs, swimmers, puches x1' 1/2 foam pec stretch, hugs, swimmers, punches x1' ea 1/2 foam, pec stretch, hugs, swimmers, punches  x1' 1/2 foam, pec stretch,hugs, swimmers,punches x1'  1/2 foam, pec stretch, hug, swimmers, puches  x1'     H/L L Pec Stretch - 1/2 Foam W/chin tucks  10"x10  w/chin tucks 10"x10 w/chin tucks 10"x10 W/ chin tuck   10"x10 W/ chin tuck 10"x10  W/ chin tuck 10"x10     H/L L Pec Stretch with LTR To R  1/2 Foam 10"  1x5 10"  1x5 10  1x5 Static x 2'   Static  x2' Static x2'  Static  x2'      Seated UT & LS Stretches              Biceps/Pec Wall Stretch             Doorway Pec Stretch 30"x3 30"x3 30"x3 30"x3 30"x3 30"x3  30"x3     Prone Scapular Retractions             UBE - Retro L5  3'/3' L5 3'/3' L5  3'/3' L5  Retro Only  6' L5 retro  x6'  L5 retro   x6'  L5 retro  x6'     TB rows Purple  5"   2x10 purple 5" 2x10 purple 5"  2x10 Purple  5"   2x10 Purple  5" 2x10 Purple  5" 2x10  Purple  5"  2x10      TB Ext Blue  5"  2x10 Blue  5"  2x10 Blue   5" 2x10 Blue  5"  2x10 Blue  5" 2x10 Blue  5" 2x10  Blue  5"2x10      B ER                             TB H ABD Green  5"  1x15 Green  5"  1x15 Green  5"  1x15 Green  5"  2x10 Green  5" 2x10 Green  5" 2x10  Green  5"  2x10      TB Low "W" Orange  5"  1x10 orange 1x10 Pink  1x10 Pink  1x10 Pink  1x12 Green  1x10  Green  5" 2x10                                 Modalities    MHP C-T Spine  90/90  Pre-Tx

## 2023-02-02 ENCOUNTER — APPOINTMENT (OUTPATIENT)
Dept: PHYSICAL THERAPY | Facility: REHABILITATION | Age: 43
End: 2023-02-02

## 2023-02-07 ENCOUNTER — OFFICE VISIT (OUTPATIENT)
Dept: PHYSICAL THERAPY | Facility: REHABILITATION | Age: 43
End: 2023-02-07

## 2023-02-07 DIAGNOSIS — M25.512 ACUTE PAIN OF LEFT SHOULDER: ICD-10-CM

## 2023-02-07 DIAGNOSIS — M54.12 CERVICAL RADICULOPATHY: Primary | ICD-10-CM

## 2023-02-07 NOTE — PROGRESS NOTES
Daily Note     Today's date: 2023  Patient name: Bobby Stroud  : 1980  MRN: [id number]  Referring provider: Samina Barrow, PT  Dx:   Encounter Diagnosis     ICD-10-CM    1  Cervical radiculopathy  M54 12       2  Acute pain of left shoulder  M25 512                      Subjective: Pt reports that he is better than he was last visit  Objective: See treatment diary below      Assessment: Tolerated treatment well  Patient demonstrated fatigue post treatment, exhibited good technique with therapeutic exercises and would benefit from continued PT  Did not progress program as patient was still challenged with program  Prior to session pt demonstrated little dis-association of cervical spine from thoracic spine movement, post tx pt demonstrated improved dis-association of the cervical spine into rotation  Plan: Continue per plan of care  Progress treatment as tolerated         Precautions: anxiety, asthma, DM, GERD, HTN     Daily Treatment Diary      Assessment 1/3 1/5 1/10 1/12 1/17 1/23 1/26 1/31 2/7    Eval/Reval             FOTO             Manuals    L 1st Rib Mob              L UT & LS Stretch and STM MD SC SC MD CC CC MD CC KB     L Cervical Opening Mobs C3-C6              Cervical Traction MD SC SC MD CC CC MD CC KB    STM/TPR MD SC SC MD CC CC MD CC KB    L UT Stretch MD SC SC MD CC CC MD CC KB     PA Mobs T1-T6             Exercise Diary     Cervical SNAGs for Rotation - B/L NV 5x10" 5x10"           Upper Thoracic Extension Stretch - Rolled Towel NP             Supine 1/2 Foam Roll Pec Stretch 1/2 foam  pec stretch, hugs, swimmers, punches x1' ea 1/2 foam pec stretch, hugs, swimmers, punches x1' ea 1/2 foam pec stretch, hugs, swimmers, puches x1' 1/2 foam pec stretch, hugs, swimmers, punches x1' ea 1/2 foam, pec stretch, hugs, swimmers, punches  x1' 1/2 foam, pec stretch,hugs, swimmers,punches x1'  1/2 foam, pec stretch, hug, swimmers, puches  x1' 1/2 foam, pec stretch, hug, swimmers, puches  x1' ea    H/L L Pec Stretch - 1/2 Foam W/chin tucks  10"x10  w/chin tucks 10"x10 w/chin tucks 10"x10 W/ chin tuck   10"x10 W/ chin tuck 10"x10  W/ chin tuck 10"x10 W/ chin tuck 10"x10    H/L L Pec Stretch with LTR To R  1/2 Foam 10"  1x5 10"  1x5 10  1x5 Static x 2'   Static  x2' Static x2'  Static  x2' Static  x2'     Seated UT & LS Stretches              Biceps/Pec Wall Stretch             Doorway Pec Stretch 30"x3 30"x3 30"x3 30"x3 30"x3 30"x3  30"x3 30"x3    Prone Scapular Retractions             UBE - Retro L5  3'/3' L5 3'/3' L5  3'/3' L5  Retro Only  6' L5 retro  x6'  L5 retro   x6'  L5 retro  x6' L5 retro  x6'    TB rows Purple  5"   2x10 purple 5" 2x10 purple 5"  2x10 Purple  5"   2x10 Purple  5" 2x10 Purple  5" 2x10  Purple  5"  2x10 Purple  5"  2x10     TB Ext Blue  5"  2x10 Blue  5"  2x10 Blue   5" 2x10 Blue  5"  2x10 Blue  5" 2x10 Blue  5" 2x10  Blue  5"2x10 Blue  5"2x10     B ER                             TB H ABD Green  5"  1x15 Green  5"  1x15 Green  5"  1x15 Green  5"  2x10 Green  5" 2x10 Green  5" 2x10  Green  5"  2x10 Green  5"  2x10     TB Low "W" Orange  5"  1x10 orange 1x10 Pink  1x10 Pink  1x10 Pink  1x12 Green  1x10  Green  5" 2x10 Green  5"  2x10                                Modalities    MHP C-T Spine  90/90  Pre-Tx

## 2023-02-09 ENCOUNTER — EVALUATION (OUTPATIENT)
Dept: PHYSICAL THERAPY | Facility: REHABILITATION | Age: 43
End: 2023-02-09

## 2023-02-09 DIAGNOSIS — M54.12 CERVICAL RADICULOPATHY: Primary | ICD-10-CM

## 2023-02-09 DIAGNOSIS — M25.512 ACUTE PAIN OF LEFT SHOULDER: ICD-10-CM

## 2023-02-09 NOTE — PROGRESS NOTES
PT Evaluation     Today's date: 2023  Patient name: Colton Bhakta  : 1980  MRN: [id number]  Referring provider: Graciela Ibanez PT  Dx:   Encounter Diagnosis     ICD-10-CM    1  Cervical radiculopathy  M54 12       2  Acute pain of left shoulder  M25 512                      Assessment  Assessment details: Pt is a 36year old right-handed male presenting to PT with 6 week history of left cervical and shoulder pain due to work-related injury  Pt presents with cervical and left shoulder pain, impaired posture, decreased cervical and left shoulder range of motion, decreased deep cervical and left shoulder strength, impaired ability to perform ADLs with left upper extremity and decreased tolerance to activity  Additionally, pt has sleep disturbance secondary to his pain and is currently unable to return to work due to his current functional deficits  Pt is a good candidate for outpatient physical therapy and would benefit from skilled physical therapy to address limitations and to achieve goals  Thank you for this referral    Impairments: abnormal coordination, abnormal or restricted ROM, activity intolerance, impaired physical strength and pain with function  Understanding of Dx/Px/POC: good   Prognosis: good    Goals  ST  Patient will report 25% decrease in pain in 4 weeks  2  Patient will demonstrate 25% improvement in ROM in 4 weeks  3  Patient will demonstrate 1/2 grade improvement in strength in 4 weeks  LT  Patient will be able to perform IADLS without restriction or pain by discharge  2  Patient will be independent in HEP by discharge  3  Patient will be able to return to recreational/work duties without restriction or pain by discharge        Plan  Patient would benefit from: PT eval and skilled PT  Planned modality interventions: cryotherapy and thermotherapy: hydrocollator packs  Planned therapy interventions: IADL retraining, body mechanics training, flexibility, functional ROM exercises, home exercise program, neuromuscular re-education, manual therapy, postural training, strengthening, stretching, therapeutic activities, therapeutic exercise and joint mobilization  Frequency: 2x week  Duration in visits: 8  Duration in weeks: 4  Treatment plan discussed with: patient        Subjective Evaluation    History of Present Illness  Date of onset: 6/17/2022  Mechanism of injury: 2/9/23 Re-Evaluation:  Pt is a 36year old right handed male who has been regularly attending PT after left shoulder injury sustained in June 2022  Pt reports he feels 90% overall improvement since starting PT  He reports he continues with episodes of left shoulder pain with reaching in certain motions  He reports his strength is much improved, however, does have pain with lifting at times  Pain Location:  left shoulder and upper arm    Pain Type: stabbing, pressure     Pain Intensity:  Current: 2  Best: 0  Worst: 6      Pt reports increased pain and/or difficulty with: driving (resolved), lifting left arm above head (improved), looking up/down/side-side (improved), lifting objects with both hands (improved), showering (improved), ADLs (improved)  Pt reports sleep disturbance secondary to pain waking 2 times/night (resolved)    Pt reports decreased pain with: heating pad and Voltaren gel as needed        ______________________________________________________  Pt is a 36year old right-handed male presenting to PT approximately 6 weeks s/p injury at work  Pt reports around 11am he was using a  high-reach machine, when his machine was accidentally struck by another machine operated by another person  Pt reports he caught himself with left hand and felt a "whiplash-type" pain  Pt reports he went to look up a little bit later and felt a jolt of pain in left-side of his neck which then radiated down his left arm  Pt reported his injury, and was issued ice packs and Motrin by his    Pt spoke to a nurse and was referred to [facility] for further evaluation  Pt reports he went on 6/17 and was evaluated, prescribed a muscle relaxer, and then scheduled for follow-up on 6/20  Pt reports he was placed on restricted-duty, and returned to work on 6/21  Pt reports his pain became so severe he went to ER on 6/23  Pt reports x-rays performed, results as follows: The vertebral body heights including the intervertebral disc spaces are relatively maintained with no evidence for acute fracture or spondylolisthesis  Tiny degenerative endplate osteophytes seen at C5-C6  Pt reports he was prescribed Motrin and an increased dosage with muscle relaxant  Pt reports he returned to work on 6/24, then discontinued working on 6/29 when his work restriction was changed to sedentary and his employer said they could not accommodate him  Pt reports he applied for Workmen's Compensation, however, found out he was denied on 7/5/22  Pt was referred to [facility]  Pain Location: cervical spine, radiating into left shoulder and upper arm    Pain Type: stabbing, pressure ("someone sitting on shoulder"    Pain Intensity:  Current: 8  Best: 7  Worst: 10      Pt reports increased pain and/or difficulty with: driving, lifting left arm above head, looking up/down/side-side, lifting objects with both hands, showering, ADLs  Pt reports sleep disturbance secondary to pain waking 2 times/night  Pt reports decreased pain with: heating pad, medication, Voltaren gel            Not a recurrent problem   Quality of life: good      Diagnostic Tests  X-ray: normal  Treatments  Current treatment: medication  Patient Goals  Patient goals for therapy: decreased pain, increased motion, increased strength, independence with ADLs/IADLs and return to work          Objective     Postural Observations    Additional Postural Observation Details  Moderate forward head, bilateral protracted and IR shoulders, moderately increased thoracic kyphosis      Tenderness Additional Tenderness Details  TTP L 1st rib, UT, LS, scalenes and SCM    Neurological Testing     Sensation   Cervical/Thoracic   Left   Diminished: light touch    Right   Intact: light touch    Comments   Left light touch: left clavicular region  Reflexes   Left   Biceps (C5/C6): normal (2+)  Triceps (C7): normal (2+)    Right   Biceps (C5/C6): normal (2+)  Triceps (C7): normal (2+)    Active Range of Motion   Cervical/Thoracic Spine       Cervical    Flexion: 46 degrees  with pain  Extension: 19 degrees     with pain  Left lateral flexion: 34 degrees     with pain  Right lateral flexion: 31 degrees     with pain  Left rotation: 64 degrees with pain  Right rotation: 60 degrees    with pain  Left Shoulder   Flexion: 120 degrees with pain  Abduction: 98 degrees with pain  External rotation BTH: Active external rotation behind the head: left ear w/compensatory cervical flexion  with pain  Internal rotation BTB: L1 with pain    Right Shoulder   Flexion: 155 degrees   Abduction: 155 degrees   External rotation BTH: T1   Internal rotation BTB: T10     Additional Active Range of Motion Details  Pt reports increased localized left cervical pain with extension > right lateral flexion, with general increased soreness with all planes of motion       Joint Play   Joints within functional limits: C1, C2, C3, C4 and C5     Hypomobile: C6, C7, T1, T2, T3, T4, T5 and T6     Pain: C3, C4, C5, C6 and C7     Strength/Myotome Testing     Left Shoulder     Planes of Motion   Flexion: 3-   Abduction: 3-   External rotation at 0°: 4-   Internal rotation at 0°: 4     Isolated Muscles   Lower trapezius: 3-   Middle trapezius: 3-     Right Shoulder     Planes of Motion   Flexion: 5   Abduction: 5   External rotation at 0°: 5   Internal rotation at 0°: 5     Isolated Muscles   Lower trapezius: 4-   Middle trapezius: 4-     Left Elbow   Flexion: 4+  Extension: 4+    Right Elbow   Flexion: 5  Extension: 5    Left Wrist/Hand   Wrist extension: 5  Wrist flexion: 4+  Radial deviation: 4+  Ulnar deviation: 4+    Right Wrist/Hand   Wrist extension: 5  Wrist flexion: 5  Radial deviation: 5  Ulnar deviation: 5    Tests   Cervical   Positive neck flexor muscle endurance test     Left   Positive Spurling's Test A and cervical flexion-rotation test       Right   Negative Spurling's Test A  Left Shoulder   Negative Speed's, ULTT1, ULTT3 and ULTT4  Right Shoulder   Positive Speed's  Negative ULTT1, ULTT2, ULTT3 and ULTT4  Precautions: anxiety, asthma, DM, GERD, HTN     Daily Treatment Diary      Assessment 2/9            Eval/Reval             FOTO             Manuals    L GH Mobs:  Info & Post              L Shoulder PROM              T1-T6 PA Mobs             Exercise Diary     Upper Thoracic Extension Stretch - 1/2 Foam NV             Supine 1/2 Foam Roll Pec Stretch 1/2 foam  pec stretch, hugs, swimmers, punches x1' ea            H/L L Pec Stretch - 1/2 Foam W/chin tucks  10"x10            H/L L Pec Stretch with LTR To R  1/2 Foam 10"  1x5            Doorway Pec Stretch 30"x3            Prone Scapular Retractions NV            Prone H ABD NV            Prone 90/90 NV            UBE - Retro NV            TB rows              TB Ext              B ER                             TB H ABD              TB Low "W"                                         Modalities

## 2023-02-14 ENCOUNTER — OFFICE VISIT (OUTPATIENT)
Dept: PHYSICAL THERAPY | Facility: REHABILITATION | Age: 43
End: 2023-02-14

## 2023-02-14 DIAGNOSIS — M25.512 ACUTE PAIN OF LEFT SHOULDER: ICD-10-CM

## 2023-02-14 DIAGNOSIS — M54.12 CERVICAL RADICULOPATHY: Primary | ICD-10-CM

## 2023-02-14 NOTE — PROGRESS NOTES
Daily Note     Today's date: 2023  Patient name: Vivi Salazar  : 1980  MRN: [id number]  Referring provider: Alberto Sotomayor, PT  Dx:   Encounter Diagnosis     ICD-10-CM    1  Cervical radiculopathy  M54 12       2  Acute pain of left shoulder  M25 512           Start Time: 1115  Stop Time: 1200  Total time in clinic (min): 45 minutes    Subjective: Patient reports his shoulder is "90% better "      Objective: See treatment diary below      Assessment: Patient tolerated treatment well  Good form and understanding of exercises performed  Patient tolerated left shoulder PROM without complaints  Pt will benefit from continued skilled PT intervention in order to address his remaining limitations and to restore maximal function  Plan: Continue per plan of care  Precautions: anxiety, asthma, DM, GERD, HTN     Daily Treatment Diary      Assessment            Eval/Reval             FOTO             Manuals    L GH Mobs:  Info & Post              L Shoulder PROM  JG            T1-T6 PA Mobs             Exercise Diary     Upper Thoracic Extension Stretch - 1/2 Foam NV 1' supine            Supine 1/2 Foam Roll Pec Stretch 1/2 foam  pec stretch, hugs, swimmers, punches x1' ea 1/2 foam  pec stretch, hugs, swimmers, punches x1' ea           H/L L Pec Stretch - 1/2 Foam W/chin tucks  10"x10            H/L L Pec Stretch with LTR To R  1/2 Foam 10"  1x5            Doorway Pec Stretch 30"x3 30"x3           Prone Scapular Retractions NV 5"x10           Prone H ABD NV 5"x10           Prone 90/90 NV            UBE - Retro NV L1 6'           TB rows  GTB 5x20            TB Ext  GTB 5"x15            B ER                             TB H ABD              TB Low "W"                                         Modalities

## 2023-02-15 ENCOUNTER — OFFICE VISIT (OUTPATIENT)
Dept: FAMILY MEDICINE CLINIC | Facility: CLINIC | Age: 43
End: 2023-02-15

## 2023-02-15 VITALS
BODY MASS INDEX: 33.8 KG/M2 | DIASTOLIC BLOOD PRESSURE: 82 MMHG | OXYGEN SATURATION: 98 % | RESPIRATION RATE: 16 BRPM | TEMPERATURE: 97.8 F | HEIGHT: 75 IN | WEIGHT: 271.8 LBS | HEART RATE: 96 BPM | SYSTOLIC BLOOD PRESSURE: 148 MMHG

## 2023-02-15 DIAGNOSIS — M54.2 NECK PAIN, CHRONIC: ICD-10-CM

## 2023-02-15 DIAGNOSIS — Z00.00 WELL ADULT EXAM: ICD-10-CM

## 2023-02-15 DIAGNOSIS — E11.65 TYPE 2 DIABETES MELLITUS WITH HYPERGLYCEMIA, WITHOUT LONG-TERM CURRENT USE OF INSULIN (HCC): ICD-10-CM

## 2023-02-15 DIAGNOSIS — G89.29 NECK PAIN, CHRONIC: ICD-10-CM

## 2023-02-15 DIAGNOSIS — Z23 ENCOUNTER FOR IMMUNIZATION: ICD-10-CM

## 2023-02-15 DIAGNOSIS — Z11.59 NEED FOR HEPATITIS C SCREENING TEST: ICD-10-CM

## 2023-02-15 DIAGNOSIS — E66.09 CLASS 1 OBESITY DUE TO EXCESS CALORIES WITHOUT SERIOUS COMORBIDITY WITH BODY MASS INDEX (BMI) OF 33.0 TO 33.9 IN ADULT: ICD-10-CM

## 2023-02-15 DIAGNOSIS — F41.8 ANXIETY ASSOCIATED WITH DEPRESSION: ICD-10-CM

## 2023-02-15 DIAGNOSIS — Z11.4 SCREENING FOR HIV (HUMAN IMMUNODEFICIENCY VIRUS): ICD-10-CM

## 2023-02-15 DIAGNOSIS — E78.5 DYSLIPIDEMIA: ICD-10-CM

## 2023-02-15 DIAGNOSIS — I10 ESSENTIAL HYPERTENSION: Primary | ICD-10-CM

## 2023-02-15 LAB
CREAT UR-MCNC: 215 MG/DL
MICROALBUMIN UR-MCNC: 11.6 MG/L (ref 0–20)
MICROALBUMIN/CREAT 24H UR: 5 MG/G CREATININE (ref 0–30)
SL AMB POCT HEMOGLOBIN AIC: 6.8 (ref ?–6.5)

## 2023-02-15 RX ORDER — BENAZEPRIL HYDROCHLORIDE 10 MG/1
TABLET ORAL
Qty: 180 TABLET | Refills: 3
Start: 2023-02-15

## 2023-02-15 NOTE — PROGRESS NOTES
[facility] [facility]    NAME: Brie Jaime  AGE: 36 y o   SEX: male  : 1980     DATE: 2/15/2023    Chief Complaint   Patient presents with   • Physical Exam   • Pain     Neck and back      Health Maintenance   Topic Date Due   • Hepatitis C Screening  Never done   • Kidney Health Evaluation: Microalbumin/Creatinine Ratio  Never done   • COVID-19 Vaccine (1) Never done   • Pneumococcal Vaccine: Pediatrics (0 to 5 Years) and At-Risk Patients (6 to 59 Years) (1 - PCV) Never done   • DM Eye Exam  Never done   • HIV Screening  Never done   • PT PLAN OF CARE  2020   • BMI: Followup Plan  08/15/2022   • Influenza Vaccine (1) Never done   • Kidney Health Evaluation: GFR  10/14/2022   • Annual Physical  10/16/2022   • Diabetic Foot Exam  10/16/2022   • BMI: Adult  2023   • HEMOGLOBIN A1C  08/15/2023   • DTaP,Tdap,and Td Vaccines (2 - Td or Tdap) 02/15/2033   • HIB Vaccine  Aged Out   • IPV Vaccine  Aged Out   • Hepatitis A Vaccine  Aged Out   • Meningococcal ACWY Vaccine  Aged Out   • HPV Vaccine  Aged Out        Assessment and Plan:     Problem List Items Addressed This Visit        Endocrine    Type 2 diabetes mellitus, without long-term current use of insulin ([address] )    Relevant Orders    Microalbumin / creatinine urine ratio    POCT hemoglobin A1c (Completed)       Cardiovascular and Mediastinum    Essential hypertension - Primary    Relevant Medications    benazepril (LOTENSIN) 10 mg tablet       Other    Class 1 obesity due to excess calories without serious comorbidity with body mass index (BMI) of 33 0 to 33 9 in adult    Anxiety associated with depression    Dyslipidemia    Well adult exam    Neck pain, chronic   Other Visit Diagnoses     Need for hepatitis C screening test        Relevant Orders    Hepatitis C Antibody ([facility])    Encounter for immunization        Relevant Orders    TDAP VACCINE GREATER THAN OR EQUAL TO 6YO IM (Completed)    Screening for HIV (human immunodeficiency virus)        Relevant Orders    HIV 1/2 AG/AB w Reflex [facility] for 2 yr old and above          Immunizations and preventive care screenings were discussed with patient today  Appropriate education was printed on patient's after visit summary  Counseling:  Alcohol/drug use: discussed moderation in alcohol intake, the recommendations for healthy alcohol use, and avoidance of illicit drug use  Dental Health: discussed importance of regular tooth brushing, flossing, and dental visits  Injury prevention: discussed safety/seat belts, safety helmets, smoke detectors, carbon dioxide detectors, and smoking near bedding or upholstery  Sexual health: discussed sexually transmitted diseases, partner selection, use of condoms, avoidance of unintended pregnancy, and contraceptive alternatives  · Exercise: the importance of regular exercise/physical activity was discussed  Recommend exercise 3-5 times per week for at least 30 minutes  BMI Counseling: Body mass index is 33 97 kg/m²  The BMI is above normal  Nutrition recommendations include decreasing portion sizes, encouraging healthy choices of fruits and vegetables, decreasing fast food intake, consuming healthier snacks, limiting drinks that contain sugar, moderation in carbohydrate intake, increasing intake of lean protein, reducing intake of saturated and trans fat and reducing intake of cholesterol  Exercise recommendations include exercising 3-5 times per week  No pharmacotherapy was ordered  Rationale for BMI follow-up plan is due to patient being overweight or obese  Return in about 6 months (around 8/15/2023)       Chief Complaint:     Chief Complaint   Patient presents with   • Physical Exam   • Pain     Neck and back       History of Present Illness:     Adult Annual Physical   Patient here for a comprehensive physical exam  The patient reports problems: neck pain radiating to left shoulder  PMHx: HTN, Type 2 DM, Anxiety / Depression, Dyslipidemia  Diet and Physical Activity  · Diet/Nutrition: diabetic diet  · Exercise: walking and 1-2 times a week on average  Depression Screening  PHQ-2/9 Depression Screening         General Health  · Sleep: sleeps poorly  · Hearing: normal - bilateral   · Vision: no vision problems  · Dental: regular dental visits   Health  · Symptoms include: none     Review of Systems:     Review of Systems   Constitutional: Negative for activity change, appetite change, chills, fatigue and fever  HENT: Negative for congestion, ear pain, nosebleeds, sore throat and trouble swallowing  Eyes: Negative  Respiratory: Negative for cough, chest tightness, shortness of breath and wheezing  Cardiovascular: Negative for chest pain, palpitations and leg swelling  Gastrointestinal: Negative for abdominal pain, blood in stool, constipation, diarrhea, nausea and vomiting  Genitourinary: Negative for difficulty urinating, dysuria and hematuria  Musculoskeletal: Positive for neck pain  Negative for arthralgias, back pain, gait problem and joint swelling  Skin: Negative for rash  Neurological: Negative for dizziness, syncope and headaches  Hematological: Negative  Psychiatric/Behavioral: Positive for sleep disturbance          Anxiety / Depression - mood has been stable      Past Medical History:     Past Medical History:   Diagnosis Date   • Allergic    • Anxiety    • Arthritis    • Asthma    • Diabetes ([address] )    • GERD (gastroesophageal reflux disease)    • Hypertension    • Obesity       Past Surgical History:     Past Surgical History:   Procedure Laterality Date   • MYRINGOTOMY W/ TUBES        Family History:     Family History   Problem Relation Age of Onset   • Diabetes Mother    • Hypertension Mother    • Alcohol abuse Father       Social History:     Social History     Socioeconomic History   • Marital status: Single     Spouse name: None   • Number of children: None   • Years of education: None   • Highest education level: None   Occupational History   • None   Tobacco Use   • Smoking status: Never   • Smokeless tobacco: Never   Vaping Use   • Vaping Use: Never used   Substance and Sexual Activity   • Alcohol use: Yes     Comment: socially   • Drug use: No   • Sexual activity: None   Other Topics Concern   • None   Social History Narrative   • None     Social Determinants of Health     Financial Resource Strain: Not on file   Food Insecurity: Not on file   Transportation Needs: Not on file   Physical Activity: Not on file   Stress: Not on file   Social Connections: Not on file   Intimate Partner Violence: Not on file   Housing Stability: Not on file      Current Medications:     Current Outpatient Medications   Medication Sig Dispense Refill   • albuterol (ProAir HFA) 90 mcg/act inhaler Inhale 2 puffs every 6 (six) hours as needed for shortness of breath 8 5 g 0   • ALPRAZolam (XANAX) 0 25 mg tablet Take 1 tablet (0 25 mg total) by mouth 2 (two) times a day as needed for anxiety 60 tablet 3   • ascorbic acid (VITAMIN C) 500 MG tablet Take 500 mg by mouth daily     • benazepril (LOTENSIN) 10 mg tablet Take 2 tab  daily 180 tablet 3   • fluticasone (FLONASE) 50 mcg/act nasal spray 2 sprays into each nostril daily 1 Bottle 1   • metFORMIN (GLUCOPHAGE) 500 mg tablet TAKE 1 TABLET BY MOUTH TWICE A DAY WITH MEALS 180 tablet 0   • montelukast (SINGULAIR) 10 mg tablet TAKE 1 TABLET BY MOUTH EVERYDAY AT BEDTIME 90 tablet 2   • multivitamin (THERAGRAN) TABS Take 1 tablet by mouth daily       • Blood Glucose Monitoring Suppl (ONE TOUCH ULTRA 2) w/Device KIT Test blood sugar twice daily (Patient not taking: Reported on 2/15/2023) 1 kit 0   • cetirizine (ZyrTEC) 10 mg tablet Take 1 tablet (10 mg total) by mouth daily (Patient not taking: Reported on 2/15/2023) 30 tablet 5   • glucose blood (OneTouch Ultra) test strip Test blood sugar twice daily (Patient not taking: Reported on 2/15/2023) 200 each 3   • Lancets (OneTouch Delica Plus PVISDT09H) MISC Test blood sugar twice daily (Patient not taking: Reported on 2/15/2023) 200 each 3     No current facility-administered medications for this visit  Allergies: Allergies   Allergen Reactions   • Chocolate - Food Allergy Sneezing      Physical Exam:     /82 (BP Location: Left arm, Patient Position: Sitting)   Pulse 96   Temp 97 8 °F (36 6 °C) (Tympanic)   Resp 16   Ht 6' 3" (1 905 m)   Wt 123 kg (271 lb 12 8 oz)   SpO2 98%   BMI 33 97 kg/m²     Physical Exam  Vitals and nursing note reviewed  Constitutional:       Appearance: Normal appearance  He is obese  HENT:      Head: Normocephalic and atraumatic  Eyes:      Conjunctiva/sclera: Conjunctivae normal       Pupils: Pupils are equal, round, and reactive to light  Cardiovascular:      Rate and Rhythm: Regular rhythm  Tachycardia present  Heart sounds: No murmur heard  Pulmonary:      Effort: Pulmonary effort is normal       Breath sounds: Normal breath sounds  Abdominal:      General: Bowel sounds are normal  There is no distension  Palpations: Abdomen is soft  Tenderness: There is no abdominal tenderness  Musculoskeletal:      Cervical back: Normal range of motion and neck supple  Right lower leg: No edema  Left lower leg: No edema  Comments: Neck exam: no spinal or paraspinal tenderness  FROM  Left shoulder: decreased ROM with abduction, lifting left arm  Skin:     General: Skin is warm and dry  Findings: No rash  Neurological:      General: No focal deficit present  Mental Status: He is alert  Motor: No weakness        Gait: Gait normal    Psychiatric:         Mood and Affect: Mood normal           Jamshid Shah MD  [address] [place] [address]

## 2023-02-16 ENCOUNTER — OFFICE VISIT (OUTPATIENT)
Dept: PHYSICAL THERAPY | Facility: REHABILITATION | Age: 43
End: 2023-02-16

## 2023-02-16 DIAGNOSIS — M54.12 CERVICAL RADICULOPATHY: Primary | ICD-10-CM

## 2023-02-16 DIAGNOSIS — M25.512 ACUTE PAIN OF LEFT SHOULDER: ICD-10-CM

## 2023-02-16 NOTE — PROGRESS NOTES
Name: Latisha Bentley      : 1980      MRN: [id number]  Encounter Provider: Luis Eduardo Pate MD  Encounter Date: 2/15/2023   Encounter department: [address] [place] [address]  Essential hypertension  Assessment & Plan:  BP goal 130/80  Will increase dose of Benazepril to 20 mg daily  Recommended to follow a low-sodium diet, regular exercise, lose weight  Check labs  Monitor blood pressure at home and call with blood pressure log in 7 -10 days  Orders:  -     benazepril (LOTENSIN) 10 mg tablet; Take 2 tab  daily    2  Type 2 diabetes mellitus with hyperglycemia, without long-term current use of insulin (HCC)  Assessment & Plan:    Lab Results   Component Value Date    HGBA1C 6 8 (A) 02/15/2023     Hb A1C at goal   Continue Metformin 500 mg 1 tablet twice daily with meals  Schedule eye exam with ophthalmology  Orders:  -     Microalbumin / creatinine urine ratio  -     POCT hemoglobin A1c    3  Dyslipidemia  Assessment & Plan:  Recommended to follow a low-cholesterol, low-fat diet, regular exercise  Check CMP, lipid panel  4  Class 1 obesity due to excess calories without serious comorbidity with body mass index (BMI) of 33 0 to 33 9 in adult  Assessment & Plan:  Recommended to work on dietary and lifestyle modifications, losing weight  5  Anxiety associated with depression  Assessment & Plan:  Mood has been stable  Patient does not take any medication for anxiety/depression currently  6  Need for hepatitis C screening test  -     Hepatitis C Antibody ([facility]); Future    7  Encounter for immunization  -     TDAP VACCINE GREATER THAN OR EQUAL TO 6YO IM    8  Screening for HIV (human immunodeficiency virus)  -     HIV 1/2 AG/AB w Reflex [facility] for 2 yr old and above; Future    9  Well adult exam    10  Neck pain, chronic  Assessment & Plan:  Patient is currently on disability due to work-related injury occurred on 2022    He iinjured his neck and left shoulder at work  C/o neck pain radiating to left shoulder and shoulder pain with ROM  Continue PT twice per week  Will fill out [address] [place] [address] employability assessment form  Schedule follow-up visit in 6 months  Subjective      HPI     Patient presents for follow-up visit  He brought to fill out [address] [place] [address] employability assessment form  Currently on disability due to work-related injury occurred on June 17, 2022  Patient injured his neck and left shoulder at work  C/o neck pain radiating to left shoulder and shoulder pain with ROM  Continues attending PT twice per week  PMHx: HTN, Type 2 DM, Dyslipidemia, Anxiety, Depression, Allergic rhinitis, recurrent sinusitis  HTN - denies chest pain, shortness of breath, dizziness  Currently taking Benazepril 10 mg daily  Type 2 DM - takes Metformin 500 mg 1 tablet twice daily with meals  Reports no hypoglycemic episodes  Denies tingling, numbness in feet  No recent blood work  Denies tobacco, drug use  Drinks alcohol socially  Refusing Pneumococcal vaccination  Assessment/Plan:    Problem List Items Addressed This Visit        Endocrine    Type 2 diabetes mellitus, without long-term current use of insulin ([facility])       Lab Results   Component Value Date    HGBA1C 6 8 (A) 02/15/2023     Hb A1C at goal   Continue Metformin 500 mg 1 tablet twice daily with meals  Schedule eye exam with ophthalmology  Relevant Orders    Microalbumin / creatinine urine ratio (Completed)    POCT hemoglobin A1c (Completed)       Cardiovascular and Mediastinum    Essential hypertension - Primary     BP goal 130/80  Will increase dose of Benazepril to 20 mg daily  Recommended to follow a low-sodium diet, regular exercise, lose weight  Check labs  Monitor blood pressure at home and call with blood pressure log in 7 -10 days  Relevant Medications    benazepril (LOTENSIN) 10 mg tablet       Other    Class 1 obesity due to excess calories without serious comorbidity with body mass index (BMI) of 33 0 to 33 9 in adult     Recommended to work on dietary and lifestyle modifications, losing weight  Anxiety associated with depression     Mood has been stable  Patient does not take any medication for anxiety/depression currently  Dyslipidemia     Recommended to follow a low-cholesterol, low-fat diet, regular exercise  Check CMP, lipid panel  Well adult exam    Neck pain, chronic     Patient is currently on disability due to work-related injury occurred on June 17, 2022  He iinjured his neck and left shoulder at work  C/o neck pain radiating to left shoulder and shoulder pain with ROM  Continue PT twice per week  Will fill out [address] [place] [address] employability assessment form  Other Visit Diagnoses     Need for hepatitis C screening test        Relevant Orders    Hepatitis C Antibody ([facility])    Encounter for immunization        Relevant Orders    TDAP VACCINE GREATER THAN OR EQUAL TO 8YO IM (Completed)    Screening for HIV (human immunodeficiency virus)        Relevant Orders    HIV 1/2 AG/AB w Reflex [facility] for 2 yr old and above             Review of Systems   Constitutional: Negative for activity change, appetite change, chills, fatigue and fever  HENT: Negative for congestion, dental problem, ear pain, hearing loss, sore throat and trouble swallowing  Eyes: Negative  Respiratory: Negative for cough, chest tightness, shortness of breath and wheezing  Cardiovascular: Negative for chest pain, palpitations and leg swelling  Gastrointestinal: Negative for abdominal pain, blood in stool, constipation, diarrhea, nausea and vomiting  Genitourinary: Negative for difficulty urinating, dysuria and hematuria     Musculoskeletal: Positive for arthralgias (left shoulder) and neck pain  Negative for back pain, gait problem and joint swelling  Skin: Negative for rash  Neurological: Negative for dizziness, syncope and headaches  Hematological: Negative  Psychiatric/Behavioral: Positive for sleep disturbance  Anxiety / Depression - mood has been stable       Current Outpatient Medications on File Prior to Visit   Medication Sig   • albuterol (ProAir HFA) 90 mcg/act inhaler Inhale 2 puffs every 6 (six) hours as needed for shortness of breath   • ALPRAZolam (XANAX) 0 25 mg tablet Take 1 tablet (0 25 mg total) by mouth 2 (two) times a day as needed for anxiety   • ascorbic acid (VITAMIN C) 500 MG tablet Take 500 mg by mouth daily   • fluticasone (FLONASE) 50 mcg/act nasal spray 2 sprays into each nostril daily   • metFORMIN (GLUCOPHAGE) 500 mg tablet TAKE 1 TABLET BY MOUTH TWICE A DAY WITH MEALS   • montelukast (SINGULAIR) 10 mg tablet TAKE 1 TABLET BY MOUTH EVERYDAY AT BEDTIME   • multivitamin (THERAGRAN) TABS Take 1 tablet by mouth daily     • [DISCONTINUED] benazepril (LOTENSIN) 10 mg tablet TAKE 1 TABLET BY MOUTH EVERY DAY   • [DISCONTINUED] benzonatate (TESSALON) 200 MG capsule Take 1 capsule (200 mg total) by mouth as needed in the morning and 1 capsule (200 mg total) as needed at noon and 1 capsule (200 mg total) as needed in the evening for cough     • Blood Glucose Monitoring Suppl (ONE TOUCH ULTRA 2) w/Device KIT Test blood sugar twice daily (Patient not taking: Reported on 2/15/2023)   • cetirizine (ZyrTEC) 10 mg tablet Take 1 tablet (10 mg total) by mouth daily (Patient not taking: Reported on 2/15/2023)   • glucose blood (OneTouch Ultra) test strip Test blood sugar twice daily (Patient not taking: Reported on 2/15/2023)   • Lancets (OneTouch Delica Plus [id number]) MISC Test blood sugar twice daily (Patient not taking: Reported on 2/15/2023)   • [DISCONTINUED] clindamycin (CLEOCIN) 150 mg capsule  (Patient not taking: Reported on 2/15/2023)   • [DISCONTINUED] methylPREDNISolone 4 MG tablet therapy pack Use as directed on package (Patient not taking: Reported on 2/15/2023)       Objective     /82 (BP Location: Left arm, Patient Position: Sitting)   Pulse 96   Temp 97 8 °F (36 6 °C) (Tympanic)   Resp 16   Ht 6' 3" (1 905 m)   Wt 123 kg (271 lb 12 8 oz)   SpO2 98%   BMI 33 97 kg/m²     Physical Exam  Vitals and nursing note reviewed  Constitutional:       Appearance: Normal appearance  He is obese  HENT:      Head: Normocephalic and atraumatic  Eyes:      Pupils: Pupils are equal, round, and reactive to light  Cardiovascular:      Rate and Rhythm: Regular rhythm  Tachycardia present  Pulses: no weak pulses          Dorsalis pedis pulses are 2+ on the right side and 2+ on the left side  Heart sounds: No murmur heard  Pulmonary:      Effort: Pulmonary effort is normal       Breath sounds: Normal breath sounds  Abdominal:      General: Bowel sounds are normal  There is no distension  Palpations: Abdomen is soft  Tenderness: There is no abdominal tenderness  Musculoskeletal:      Cervical back: Normal range of motion and neck supple  Right lower leg: No edema  Left lower leg: No edema  Comments: Neck exam: FROM  No spinal or paraspinal tenderness  Left shoulder: decreased ROM with abduction, lifting arm   Feet:      Right foot:      Skin integrity: No ulcer, skin breakdown, erythema, warmth, callus or dry skin  Left foot:      Skin integrity: No ulcer, skin breakdown, erythema, warmth, callus or dry skin  Skin:     General: Skin is warm and dry  Findings: No rash  Neurological:      General: No focal deficit present  Mental Status: He is alert  Psychiatric:         Mood and Affect: Mood normal        Patient's shoes and socks removed  Right Foot/Ankle   Right Foot Inspection  Skin Exam: skin normal and skin intact   No dry skin, no warmth, no callus, no erythema, no maceration, no abnormal color, no pre-ulcer, no ulcer and no callus  Toe Exam: No swelling, no tenderness, erythema and  no right toe deformity    Sensory   Monofilament testing: intact    Vascular  The right DP pulse is 2+  Left Foot/Ankle  Left Foot Inspection  Skin Exam: skin normal and skin intact  No dry skin, no warmth, no erythema, no maceration, normal color, no pre-ulcer, no ulcer and no callus  Toe Exam: No swelling, no tenderness, no erythema and no left toe deformity  Sensory   Monofilament testing: intact    Vascular  The left DP pulse is 2+       Assign Risk Category  No deformity present  No loss of protective sensation  No weak pulses  Risk: 0      Rhina Carlson MD

## 2023-02-16 NOTE — ASSESSMENT & PLAN NOTE
Lab Results   Component Value Date    HGBA1C 6 8 (A) 02/15/2023     Hb A1C at goal   Continue Metformin 500 mg 1 tablet twice daily with meals  Schedule eye exam with ophthalmology

## 2023-02-16 NOTE — PROGRESS NOTES
Daily Note     Today's date: 2023  Patient name: Mer Wharton  : 1980  MRN: [id number]  Referring provider: Bhavani Bunch, PT  Dx:   Encounter Diagnosis     ICD-10-CM    1  Cervical radiculopathy  M54 12       2  Acute pain of left shoulder  M25 512                      Subjective: pt reports with c/o soreness in shoulder prior to beginning today  Objective: See treatment diary below      Assessment: Tolerated treatment well  Patient demonstrated fatigue post treatment, exhibited good technique with therapeutic exercises and would benefit from continued PT      Plan: Continue per plan of care  Progress treatment as tolerated  Precautions: anxiety, asthma, DM, GERD, HTN     Daily Treatment Diary      Assessment           Eval/Reval             FOTO             Manuals    L GH Mobs:  Info & Post   TE glides           L Shoulder PROM  JG TE           T1-T6 PA Mobs             Exercise Diary     Upper Thoracic Extension Stretch - 1/2 Foam NV 1' supine 1' supine           Supine 1/2 Foam Roll Pec Stretch 1/2 foam  pec stretch, hugs, swimmers, punches x1' ea 1/2 foam  pec stretch, hugs, swimmers, punches x1' ea 1/2 foam  pec stretch, hugs, swimmers, punches x1' ea          H/L L Pec Stretch - 1/2 Foam W/chin tucks  10"x10            H/L L Pec Stretch with LTR To R  1/2 Foam 10"  1x5            Doorway Pec Stretch 30"x3 30"x3 30"x3          Prone Scapular Retractions NV 5"x10 5"x10          Prone H ABD NV 5"x10 L1 6'          Prone 90/90 NV            UBE - Retro NV L1 6' L1 6'          TB rows  GTB 5x20 GTB 5x20           TB Ext  GTB 5"x15 GTB 5"x15           B ER                             TB H ABD              TB Low "W"                                         Modalities

## 2023-02-16 NOTE — ASSESSMENT & PLAN NOTE
BP goal 130/80  Will increase dose of Benazepril to 20 mg daily  Recommended to follow a low-sodium diet, regular exercise, lose weight  Check labs  Monitor blood pressure at home and call with blood pressure log in 7 -10 days

## 2023-02-16 NOTE — ASSESSMENT & PLAN NOTE
Patient is currently on disability due to work-related injury occurred on June 17, 2022  He iinjured his neck and left shoulder at work  C/o neck pain radiating to left shoulder and shoulder pain with ROM  Continue PT twice per week  Will fill out [facility] employability assessment form

## 2023-02-21 ENCOUNTER — APPOINTMENT (OUTPATIENT)
Dept: PHYSICAL THERAPY | Facility: REHABILITATION | Age: 43
End: 2023-02-21

## 2023-02-23 ENCOUNTER — OFFICE VISIT (OUTPATIENT)
Dept: PHYSICAL THERAPY | Facility: REHABILITATION | Age: 43
End: 2023-02-23

## 2023-02-23 DIAGNOSIS — M54.12 CERVICAL RADICULOPATHY: Primary | ICD-10-CM

## 2023-02-23 DIAGNOSIS — M25.512 ACUTE PAIN OF LEFT SHOULDER: ICD-10-CM

## 2023-02-23 NOTE — PROGRESS NOTES
Daily Note     Today's date: 2023  Patient name: Ibrahima Rubio  : 1980  MRN: [id number]  Referring provider: Momo Kelly, PT  Dx:   Encounter Diagnosis     ICD-10-CM    1  Cervical radiculopathy  M54 12       2  Acute pain of left shoulder  M25 512           Start Time: 1645  Stop Time: 1725  Total time in clinic (min): 40 minutes    Subjective: Patient continues to have soreness in left shoulder but sees improvements  Objective: See treatment diary below      Assessment: Patient tolerated treatment well and was able to complete all exercises without complaints of pain  Good tolerance to left shoulder PROM  Pt will benefit from continued skilled PT intervention in order to address his remaining limitations and to restore maximal function  No increased pain reported post treatment  Plan: Continue per plan of care  Precautions: anxiety, asthma, DM, GERD, HTN     Daily Treatment Diary      Assessment          Eval/Reval             FOTO             Manuals    L GH Mobs:  Info & Post   TE glides           L Shoulder PROM  Lonza Muck          T1-T6 PA Mobs             Exercise Diary     Upper Thoracic Extension Stretch - 1/2 Foam NV 1' supine 1' supine 1' supine          Supine 1/2 Foam Roll Pec Stretch 1/2 foam  pec stretch, hugs, swimmers, punches x1' ea 1/2 foam  pec stretch, hugs, swimmers, punches x1' ea 1/2 foam  pec stretch, hugs, swimmers, punches x1' ea 1/2 foam  pec stretch, hugs, swimmers, punches x1' ea         H/L L Pec Stretch - 1/2 Foam W/chin tucks  10"x10            H/L L Pec Stretch with LTR To R  1/2 Foam 10"  1x5            Doorway Pec Stretch 30"x3 30"x3 30"x3 30"x3         Prone Scapular Retractions NV 5"x10 5"x10 5"x10         Prone H ABD NV 5"x10 L1 6' 5"x10         Prone 90/90 NV            UBE - Retro NV L1 6' L1 6' L2 6'         TB rows  GTB 5x20 GTB 5x20 GTB x20          TB Ext  GTB 5"x15 GTB 5"x15 GTB x20          B ER     GTB 5"x10 TB H ABD              TB Low "W"                                         Modalities

## 2023-02-28 ENCOUNTER — OFFICE VISIT (OUTPATIENT)
Dept: PHYSICAL THERAPY | Facility: REHABILITATION | Age: 43
End: 2023-02-28

## 2023-02-28 DIAGNOSIS — M25.512 ACUTE PAIN OF LEFT SHOULDER: ICD-10-CM

## 2023-02-28 DIAGNOSIS — M54.12 CERVICAL RADICULOPATHY: Primary | ICD-10-CM

## 2023-02-28 NOTE — PROGRESS NOTES
Daily Note     Today's date: 2023  Patient name: Sarah Mena  : 1980  MRN: [id number]  Referring provider: Jhoan Ahuja, PT  Dx:   Encounter Diagnosis     ICD-10-CM    1  Cervical radiculopathy  M54 12       2  Acute pain of left shoulder  M25 512                      Subjective: Patient reports his shoulder and neck feel good today  Objective: See treatment diary below      Assessment: Patient tolerated treatment well  Notes decreased pain and discomfort in anterior shoulder with exercise completion this visit  Good tolerance to manuals this visit  Pt would benefit from continued skilled PT to improve current deficits and maximize function  Plan: Continue per plan of care  Precautions: anxiety, asthma, DM, GERD, HTN     Daily Treatment Diary      Assessment         Eval/Reval             FOTO             Manuals    L GH Mobs:  Info & Post   TE glides  SC  glides         L Shoulder PROM  Lam JOAQUIN         T1-T6 PA Mobs             Exercise Diary     Upper Thoracic Extension Stretch - 1/2 Foam NV 1' supine 1' supine 1' supine 1' supine         Supine 1/2 Foam Roll Pec Stretch 1/2 foam  pec stretch, hugs, swimmers, punches x1' ea 1/2 foam  pec stretch, hugs, swimmers, punches x1' ea 1/2 foam  pec stretch, hugs, swimmers, punches x1' ea 1/2 foam  pec stretch, hugs, swimmers, punches x1' ea 1/2 foam pec stretch, hugs, swimmers, punches x1' ea        H/L L Pec Stretch - 1/2 Foam W/chin tucks  10"x10            H/L L Pec Stretch with LTR To R  1/2 Foam 10"  1x5            Doorway Pec Stretch 30"x3 30"x3 30"x3 30"x3 30"x3        Prone Scapular Retractions NV 5"x10 5"x10 5"x10 5"x15        Prone H ABD NV 5"x10 L1 6' 5"x10 5"x10        Prone 90/90 NV            UBE - Retro NV L1 6' L1 6' L2 6' L2 6'        TB rows  GTB 5x20 GTB 5x20 GTB x20 GTB  x20         TB Ext  GTB 5"x15 GTB 5"x15 GTB x20 GTB x20         B ER     GTB 5"x10 GTB  5"x15                       TB H ABD OTB  5"x10    hold         TB Low "W"                                         Modalities

## 2023-03-09 ENCOUNTER — OFFICE VISIT (OUTPATIENT)
Dept: FAMILY MEDICINE CLINIC | Facility: CLINIC | Age: 43
End: 2023-03-09

## 2023-03-09 VITALS
TEMPERATURE: 100.8 F | RESPIRATION RATE: 20 BRPM | HEIGHT: 75 IN | BODY MASS INDEX: 33.8 KG/M2 | OXYGEN SATURATION: 97 % | HEART RATE: 122 BPM | WEIGHT: 271.8 LBS | DIASTOLIC BLOOD PRESSURE: 90 MMHG | SYSTOLIC BLOOD PRESSURE: 144 MMHG

## 2023-03-09 DIAGNOSIS — I10 ESSENTIAL HYPERTENSION: ICD-10-CM

## 2023-03-09 DIAGNOSIS — B34.9 VIRAL SYNDROME: Primary | ICD-10-CM

## 2023-03-09 DIAGNOSIS — U07.1 COVID-19 VIRUS INFECTION: ICD-10-CM

## 2023-03-09 RX ORDER — METHYLPREDNISOLONE 4 MG/1
TABLET ORAL
Qty: 21 EACH | Refills: 0 | Status: SHIPPED | OUTPATIENT
Start: 2023-03-09

## 2023-03-09 RX ORDER — NIRMATRELVIR AND RITONAVIR 300-100 MG
3 KIT ORAL 2 TIMES DAILY
Qty: 30 TABLET | Refills: 0 | Status: SHIPPED | OUTPATIENT
Start: 2023-03-09 | End: 2023-03-14

## 2023-03-09 NOTE — ASSESSMENT & PLAN NOTE
Rapid test for COVID is positive  Recommended patient to isolate for 5 days  Encouraged to increase fluid intake, take vitamin C, vitamin D  Discussed antiviral medications for COVID  Will start on Paxlovid  Reviewed side effects  Recommended to call office if symptoms persist or worsen

## 2023-03-09 NOTE — ASSESSMENT & PLAN NOTE
Will swab for Covid  Recommended to increase fluid intake  Take Tylenol as needed for headache fever or chills  Stop taking Allegra D due to elevated BP  Take mucinex PRN for congestion/ cough

## 2023-03-21 ENCOUNTER — TELEPHONE (OUTPATIENT)
Dept: FAMILY MEDICINE CLINIC | Facility: CLINIC | Age: 43
End: 2023-03-21

## 2023-03-21 NOTE — TELEPHONE ENCOUNTER
Patient ([phone]) called in response to voicemail received from provider  Patient requests return call when possible

## 2023-04-16 PROBLEM — Z00.00 WELL ADULT EXAM: Status: RESOLVED | Noted: 2021-10-16 | Resolved: 2023-04-16

## 2023-05-18 ENCOUNTER — TELEPHONE (OUTPATIENT)
Dept: FAMILY MEDICINE CLINIC | Facility: CLINIC | Age: 43
End: 2023-05-18

## 2023-05-18 NOTE — TELEPHONE ENCOUNTER
Javier Angel with Dr Ottoniel Reaves ([phone]) called in regards to a disability peer to peer review with [address]  Reports review is needed by May 22nd before 12:00 PM The reference number is [id number]

## 2023-06-14 DIAGNOSIS — J30.1 SEASONAL ALLERGIC RHINITIS DUE TO POLLEN: ICD-10-CM

## 2023-06-14 RX ORDER — MONTELUKAST SODIUM 10 MG/1
TABLET ORAL
Qty: 90 TABLET | Refills: 2 | Status: SHIPPED | OUTPATIENT
Start: 2023-06-14

## 2023-07-12 ENCOUNTER — OFFICE VISIT (OUTPATIENT)
Dept: URGENT CARE | Age: 43
End: 2023-07-12

## 2023-07-12 VITALS
BODY MASS INDEX: 32.95 KG/M2 | RESPIRATION RATE: 18 BRPM | HEART RATE: 115 BPM | OXYGEN SATURATION: 97 % | HEIGHT: 75 IN | WEIGHT: 265 LBS | TEMPERATURE: 97.7 F

## 2023-07-12 DIAGNOSIS — J20.9 ACUTE BRONCHITIS, UNSPECIFIED ORGANISM: Primary | ICD-10-CM

## 2023-07-12 PROCEDURE — G0382 LEV 3 HOSP TYPE B ED VISIT: HCPCS | Performed by: NURSE PRACTITIONER

## 2023-07-12 PROCEDURE — 96372 THER/PROPH/DIAG INJ SC/IM: CPT | Performed by: NURSE PRACTITIONER

## 2023-07-12 RX ORDER — AZITHROMYCIN 250 MG/1
TABLET, FILM COATED ORAL
Qty: 6 TABLET | Refills: 0 | Status: SHIPPED | OUTPATIENT
Start: 2023-07-12 | End: 2023-07-16

## 2023-07-12 RX ORDER — METHYLPREDNISOLONE 4 MG/1
TABLET ORAL
Qty: 1 EACH | Refills: 0 | Status: SHIPPED | OUTPATIENT
Start: 2023-07-12

## 2023-07-12 RX ORDER — ALBUTEROL SULFATE 90 UG/1
2 AEROSOL, METERED RESPIRATORY (INHALATION) EVERY 4 HOURS PRN
Qty: 8.5 G | Refills: 1 | Status: SHIPPED | OUTPATIENT
Start: 2023-07-12

## 2023-07-12 RX ORDER — METHYLPREDNISOLONE SODIUM SUCCINATE 125 MG/2ML
60 INJECTION, POWDER, LYOPHILIZED, FOR SOLUTION INTRAMUSCULAR; INTRAVENOUS ONCE
Status: COMPLETED | OUTPATIENT
Start: 2023-07-12 | End: 2023-07-12

## 2023-07-12 RX ADMIN — METHYLPREDNISOLONE SODIUM SUCCINATE 60 MG: 125 INJECTION, POWDER, LYOPHILIZED, FOR SOLUTION INTRAMUSCULAR; INTRAVENOUS at 20:11

## 2023-07-12 NOTE — PROGRESS NOTES
Go to the ER now for a higher level of care.    Patient Education     Peripheral Artery Disease (PAD)   Peripheral artery disease (PAD) occurs when the arteries that carry blood to the limbs are narrowed or blocked. This is usually due to a buildup of a fatty substance called plaque in the walls of the arteries.  PAD most often affects the arteries in the legs. When these arteries are narrowed or blocked, blood flow to the legs is reduced. This can cause leg and foot pain and other symptoms. If severe enough, reduced blood flow to the legs can lead to tissue death (gangrene) and the loss of a toe, foot, or leg. Having PAD also makes it more likely that arteries in other body areas are blocked. For instance, arteries that carry blood to the heart or brain may be affected. This raises the chances of heart attack and stroke.  Risk factors  Certain factors can make PAD more likely. They include:  · Smoking  · Diabetes  · High blood pressure  · Unhealthy cholesterol levels  · Obesity  · Inactive lifestyle  · Older age  · Family history of PAD  Symptoms  Many people with PAD have no symptoms. If symptoms do occur, they can include:  · Pain in the muscles of the calves, thighs, or hips that gets worse with activity and better with rest (intermittent claudication)  · Achy, tired, or heavy feeling in the legs  · Weakness, numbness, tingling, or loss of feeling in the legs  · Changes in skin color of the legs  · Sores on the legs and feet  · Cold leg, feet, or toes  · Pain in the feet or toes even when lying down (rest pain)  Home care  PAD is a chronic (lifelong) condition. Treatment is focused on managing your condition and lowering your health risks. This may include doing the following:  · If you smoke, quit. This helps prevent further damage to your arteries and lowers your health risks. Ask your provider about medicines or products that can help you quit smoking. Also consider joining a stop-smoking program or support  [facility]        NAME: Jairo Machuca is a 39 y.o. male  : 1980    MRN: [id number]  DATE: 2023  TIME: 8:16 PM      Assessment and Plan     Acute bronchitis, unspecified organism [J20.9]  1. Acute bronchitis, unspecified organism  azithromycin (ZITHROMAX) 250 mg tablet    methylPREDNISolone 4 MG tablet therapy pack    albuterol (ProAir HFA) 90 mcg/act inhaler    methylPREDNISolone sodium succinate (Solu-MEDROL) injection 60 mg            Patient Instructions     Patient Instructions     Acute Bronchitis   AMBULATORY CARE:   Acute bronchitis  is swelling and irritation in your lungs. It is usually caused by a virus and most often happens in the winter. Bronchitis may also be caused by bacteria or by a chemical irritant, such as smoke. Common symptoms include the following:   • Cough that lasts up to 3 weeks, stuffy nose    • Hoarseness, sore throat    • A fever and chills    • Feeling more tired than usual, and body aches    • Wheezing or pain when you breathe or cough    Seek care immediately if:   • You cough up blood. • Your lips or fingernails turn blue. • You feel like you are not getting enough air when you breathe. Call your doctor if:   • Your symptoms do not go away or get worse, even after treatment. • Your cough does not get better within 4 weeks. • You have questions or concerns about your condition or care. Medicines: You may  need any of the following:  • Cough suppressants  decrease your urge to cough. • Decongestants  help loosen mucus in your lungs and make it easier to cough up. This can help you breathe easier. • Inhalers  may be given. Your healthcare provider may give you one or more inhalers to help you breathe easier and cough less. An inhaler gives your medicine to open your airways. Ask your healthcare provider to show you how to use your inhaler correctly.          • Antibiotics  may be given for up to 5 days if your bronchitis is caused by bacteria. • Acetaminophen  decreases pain and fever. It is available without a doctor's order. Ask how much to take and how often to take it. Follow directions. Read the labels of all other medicines you are using to see if they also contain acetaminophen, or ask your doctor or pharmacist. Acetaminophen can cause liver damage if not taken correctly. • NSAIDs  help decrease swelling and pain or fever. This medicine is available with or without a doctor's order. NSAIDs can cause stomach bleeding or kidney problems in certain people. If you take blood thinner medicine, always ask your healthcare provider if NSAIDs are safe for you. Always read the medicine label and follow directions. • Take your medicine as directed. Contact your healthcare provider if you think your medicine is not helping or if you have side effects. Tell your provider if you are allergic to any medicine. Keep a list of the medicines, vitamins, and herbs you take. Include the amounts, and when and why you take them. Bring the list or the pill bottles to follow-up visits. Carry your medicine list with you in case of an emergency. Self-care:   • Drink liquids as directed. You may need to drink more liquids than usual to stay hydrated. Ask how much liquid to drink each day and which liquids are best for you. • Use a cool mist humidifier  to increase air moisture in your home. This may make it easier for you to breathe and help decrease your cough. • Get more rest.  Rest helps your body to heal. Slowly start to do more each day. Rest when you feel it is needed. • Avoid irritants in the air. Avoid chemicals, fumes, and dust. Wear a face mask if you must work around dust or fumes. Stay inside on days when air pollution levels are high. If you have allergies, stay inside when pollen counts are high. Do not use aerosol products, such as spray-on deodorant, bug spray, and hair spray.     • Do not smoke or be around others who are group.  · Be more active. This helps you lose weight and manage problems such as high blood pressure and unhealthy cholesterol levels. Start a walking program if advised to by your provider. Your provider may also help you form a safe exercise program that is right for your needs.  · Make healthy eating changes. This includes eating less fat, salt, and sugar.  · Take medicines for high blood pressure, unhealthy cholesterol levels, and diabetes as directed.  · Have your blood pressure and cholesterol levels checked as often as directed.  · If you have diabetes, try to keep your blood sugar well controlled. Test your blood sugar as directed.  · If you are overweight, talk to your provider about a weight-loss plan.  · Watch for cuts, scrapes, or open sores on your feet. Poor blood flow to the feet may slow healing and increase the risk of infection from these problems.   Follow-up care  Follow up with your healthcare provider, or as advised. If imaging tests such as ultrasound were done, they will be reviewed by a doctor. You will be told the results and any new findings that may affect your care.  When to seek medical advice   Call your healthcare provider right away if any of these occur:  · Sudden severe pain in the legs or feet  · Sudden cold, pale or blue color in the legs or feet  · Weakness or numbness in the legs or feet that worsens  · Any sore or wound in the legs or feet that won’t heal  · Weak pulse in your legs or feet  Know the signs of heart attack and stroke  People with PAD are at high risk for heart attack and stroke. Knowing the signs of these problems can help you protect your health and get help when you need it. Call 911 right away if you have any of the following:  Signs of a heart attack  · Chest discomfort, such as pain, aching, tightness, or pressure that lasts more than a few minutes, or that comes and goes  · Pain or discomfort in the arms, back, shoulders, neck, or jaw  · Shortness of  breath  · Sweating (often a cold, clammy sweat)  · Nausea  · Lightheadedness  Signs of a stroke  · Sudden numbness or weakness of the face, arms, or legs, especially on one side  · Sudden confusion or trouble speaking or understanding  · Sudden trouble seeing in one or both eyes  · Sudden trouble walking, dizziness, or loss of balance  · Sudden, severe headache with no known cause   Date Last Reviewed: 3/1/2018  © 0842-4232 [employer]. [address] [place] [id number]. All rights reserved. This information is not intended as a substitute for professional medical care. Always follow your healthcare professional's instructions.            smoking. Nicotine and other chemicals in cigarettes and cigars can cause lung damage. Ask your healthcare provider for information if you currently smoke and need help to quit. E-cigarettes or smokeless tobacco still contain nicotine. Talk to your healthcare provider before you use these products. Prevent acute bronchitis:       • Ask about vaccines you may need. Get a flu vaccine each year as soon as recommended, usually in September or October. Ask your healthcare provider if you should also get a pneumonia or COVID-19 vaccine. Your healthcare provider can tell you if you should also get other vaccines, and when to get them. • Prevent the spread of germs. You can decrease your risk for acute bronchitis and other illnesses by doing the following:    ? Wash your hands often with soap and water. Carry germ-killing hand lotion or gel with you. You can use the lotion or gel to clean your hands when soap and water are not available. ? Do not touch your eyes, nose, or mouth unless you have washed your hands first.    ? Always cover your mouth when you cough to prevent the spread of germs. It is best to cough into a tissue or your shirt sleeve instead of into your hand. Ask those around you to cover their mouths when they cough. ? Try to avoid people who have a cold or the flu. If you are sick, stay away from others as much as possible. Follow up with your doctor as directed:  Write down questions you have so you will remember to ask them during your follow-up visits. © Copyright [employer] 2022 Information is for End User's use only and may not be sold, redistributed or otherwise used for commercial purposes. The above information is an  only. It is not intended as medical advice for individual conditions or treatments. Talk to your doctor, nurse or pharmacist before following any medical regimen to see if it is safe and effective for you.         Follow up with PCP in 3-5 days.  Proceed to  ER if symptoms worsen. Chief Complaint     Chief Complaint   Patient presents with   • Cough     X 15 days with chest congestion. .. non prod cough. .. usually has similar issue with alleries every year. History of Present Illness     + hx asthma. Nonsmoker, never smoker. Takes singulair daily/long term. No daily steroid inhaler. Has a rescue albuterol inhaler that is usually occasional prn. Today he has used it at least 5x with minimal relief--chest tightness, wheezing, SOB, dry cough, chest sore, throat raw and sore. Alternates between claritin D and zyrtec; taking 1 or the other regularly. Has tried nyquil with minimal relief/none lately. Took mucinex without relief. Slept sitting up with poor sleep quantity and quality. Symptoms have been worsening over the last 15 days since onset. Cough  This is a new problem. The current episode started 1 to 4 weeks ago. The problem has been rapidly worsening. The problem occurs every few minutes. The cough is non-productive. Associated symptoms include chest pain, a fever (subjective/hot flashes), a sore throat, shortness of breath, sweats and wheezing. The symptoms are aggravated by lying down. His past medical history is significant for environmental allergies. Review of Systems     Review of Systems   Constitutional: Positive for fever (subjective/hot flashes). HENT: Positive for sinus pressure (at times), sinus pain and sore throat. Negative for congestion (minimal). Respiratory: Positive for cough (minimal; green), chest tightness, shortness of breath and wheezing. Cardiovascular: Positive for chest pain. Allergic/Immunologic: Positive for environmental allergies. All other systems reviewed and are negative.         Current Medications       Current Outpatient Medications:   •  albuterol (ProAir HFA) 90 mcg/act inhaler, Inhale 2 puffs every 4 (four) hours as needed for wheezing or shortness of breath, Disp: 8.5 g, Rfl: 1  •  ALPRAZolam (XANAX) 0.25 mg tablet, Take 1 tablet (0.25 mg total) by mouth 2 (two) times a day as needed for anxiety, Disp: 60 tablet, Rfl: 3  •  ascorbic acid (VITAMIN C) 500 MG tablet, Take 500 mg by mouth daily, Disp: , Rfl:   •  azithromycin (ZITHROMAX) 250 mg tablet, Take 2 tablets today then 1 tablet daily x 4 days, Disp: 6 tablet, Rfl: 0  •  benazepril (LOTENSIN) 10 mg tablet, Take 2 tab. daily, Disp: 180 tablet, Rfl: 3  •  Blood Glucose Monitoring Suppl (ONE TOUCH ULTRA 2) w/Device KIT, Test blood sugar twice daily, Disp: 1 kit, Rfl: 0  •  cetirizine (ZyrTEC) 10 mg tablet, Take 1 tablet (10 mg total) by mouth daily, Disp: 30 tablet, Rfl: 5  •  Fexofenadine HCl (MUCINEX ALLERGY PO), Take by mouth, Disp: , Rfl:   •  fluticasone (FLONASE) 50 mcg/act nasal spray, 2 sprays into each nostril daily, Disp: 1 Bottle, Rfl: 1  •  glucose blood (OneTouch Ultra) test strip, Test blood sugar twice daily, Disp: 200 each, Rfl: 3  •  Lancets (OneTouch Delica Plus [id number]) MISC, Test blood sugar twice daily, Disp: 200 each, Rfl: 3  •  metFORMIN (GLUCOPHAGE) 500 mg tablet, TAKE 1 TABLET BY MOUTH TWICE A DAY WITH MEALS, Disp: 60 tablet, Rfl: 5  •  methylPREDNISolone 4 MG tablet therapy pack, Use as directed on package, Disp: 1 each, Rfl: 0  •  montelukast (SINGULAIR) 10 mg tablet, TAKE 1 TABLET BY MOUTH EVERYDAY AT BEDTIME, Disp: 90 tablet, Rfl: 2  •  multivitamin (THERAGRAN) TABS, Take 1 tablet by mouth daily  , Disp: , Rfl:   •  Pseudoeph-Doxylamine-DM-APAP (NYQUIL PO), Take by mouth, Disp: , Rfl:   No current facility-administered medications for this visit.     Current Allergies     Allergies as of 07/12/2023 - Reviewed 07/12/2023   Allergen Reaction Noted   • Chocolate - food allergy Sneezing 11/28/2017              The following portions of the patient's history were reviewed and updated as appropriate: allergies, current medications, past family history, past medical history, past social history, past surgical history and problem list.     Past Medical History:   Diagnosis Date   • Allergic    • Anxiety    • Arthritis    • Asthma    • Diabetes ([address])    • GERD (gastroesophageal reflux disease)    • Hypertension    • Obesity        Past Surgical History:   Procedure Laterality Date   • MYRINGOTOMY W/ TUBES         Family History   Problem Relation Age of Onset   • Diabetes Mother    • Hypertension Mother    • Alcohol abuse Father          Medications have been verified. Objective     Pulse (!) 115   Temp 97.7 °F (36.5 °C)   Resp 18   Ht 6' 3" (1.905 m)   Wt 120 kg (265 lb)   SpO2 97%   BMI 33.12 kg/m²   No LMP for male patient. Physical Exam     Physical Exam  Vitals and nursing note reviewed. Constitutional:       General: He is not in acute distress. Appearance: Normal appearance. He is well-developed. He is not toxic-appearing or diaphoretic. HENT:      Head: Normocephalic and atraumatic. Nose: Nose normal.      Mouth/Throat:      Mouth: Mucous membranes are moist.      Pharynx: Posterior oropharyngeal erythema (mild, irritated looking) present. No oropharyngeal exudate. Eyes:      Pupils: Pupils are equal, round, and reactive to light. Cardiovascular:      Rate and Rhythm: Normal rate and regular rhythm. Heart sounds: Normal heart sounds. No murmur heard. No friction rub. No gallop. Pulmonary:      Effort: Pulmonary effort is normal. No tachypnea, bradypnea, accessory muscle usage or respiratory distress. Breath sounds: No stridor. Wheezing (mild I&E wheezes) present. No decreased breath sounds, rhonchi or rales. Chest:      Chest wall: No tenderness. Abdominal:      General: Bowel sounds are normal. There is no distension. Palpations: Abdomen is soft. Tenderness: There is no abdominal tenderness. Musculoskeletal:         General: Normal range of motion. Cervical back: Normal range of motion and neck supple.    Skin:     General: Skin is warm and dry. Capillary Refill: Capillary refill takes less than 2 seconds. Neurological:      Mental Status: He is alert and oriented to person, place, and time. Psychiatric:         Behavior: Behavior normal.         Thought Content:  Thought content normal.         Judgment: Judgment normal.

## 2023-07-13 NOTE — PATIENT INSTRUCTIONS
Acute Bronchitis   AMBULATORY CARE:   Acute bronchitis  is swelling and irritation in your lungs. It is usually caused by a virus and most often happens in the winter. Bronchitis may also be caused by bacteria or by a chemical irritant, such as smoke. Common symptoms include the following:   Cough that lasts up to 3 weeks, stuffy nose    Hoarseness, sore throat    A fever and chills    Feeling more tired than usual, and body aches    Wheezing or pain when you breathe or cough    Seek care immediately if:   You cough up blood. Your lips or fingernails turn blue. You feel like you are not getting enough air when you breathe. Call your doctor if:   Your symptoms do not go away or get worse, even after treatment. Your cough does not get better within 4 weeks. You have questions or concerns about your condition or care. Medicines: You may  need any of the following:  Cough suppressants  decrease your urge to cough. Decongestants  help loosen mucus in your lungs and make it easier to cough up. This can help you breathe easier. Inhalers  may be given. Your healthcare provider may give you one or more inhalers to help you breathe easier and cough less. An inhaler gives your medicine to open your airways. Ask your healthcare provider to show you how to use your inhaler correctly. Antibiotics  may be given for up to 5 days if your bronchitis is caused by bacteria. Acetaminophen  decreases pain and fever. It is available without a doctor's order. Ask how much to take and how often to take it. Follow directions. Read the labels of all other medicines you are using to see if they also contain acetaminophen, or ask your doctor or pharmacist. Acetaminophen can cause liver damage if not taken correctly. NSAIDs  help decrease swelling and pain or fever. This medicine is available with or without a doctor's order. NSAIDs can cause stomach bleeding or kidney problems in certain people.  If you take blood thinner medicine, always ask your healthcare provider if NSAIDs are safe for you. Always read the medicine label and follow directions. Take your medicine as directed. Contact your healthcare provider if you think your medicine is not helping or if you have side effects. Tell your provider if you are allergic to any medicine. Keep a list of the medicines, vitamins, and herbs you take. Include the amounts, and when and why you take them. Bring the list or the pill bottles to follow-up visits. Carry your medicine list with you in case of an emergency. Self-care:   Drink liquids as directed. You may need to drink more liquids than usual to stay hydrated. Ask how much liquid to drink each day and which liquids are best for you. Use a cool mist humidifier  to increase air moisture in your home. This may make it easier for you to breathe and help decrease your cough. Get more rest.  Rest helps your body to heal. Slowly start to do more each day. Rest when you feel it is needed. Avoid irritants in the air. Avoid chemicals, fumes, and dust. Wear a face mask if you must work around dust or fumes. Stay inside on days when air pollution levels are high. If you have allergies, stay inside when pollen counts are high. Do not use aerosol products, such as spray-on deodorant, bug spray, and hair spray. Do not smoke or be around others who are smoking. Nicotine and other chemicals in cigarettes and cigars can cause lung damage. Ask your healthcare provider for information if you currently smoke and need help to quit. E-cigarettes or smokeless tobacco still contain nicotine. Talk to your healthcare provider before you use these products. Prevent acute bronchitis:       Ask about vaccines you may need. Get a flu vaccine each year as soon as recommended, usually in September or October. Ask your healthcare provider if you should also get a pneumonia or COVID-19 vaccine.  Your healthcare provider can tell you if you should also get other vaccines, and when to get them. Prevent the spread of germs. You can decrease your risk for acute bronchitis and other illnesses by doing the following:    Wash your hands often with soap and water. Carry germ-killing hand lotion or gel with you. You can use the lotion or gel to clean your hands when soap and water are not available. Do not touch your eyes, nose, or mouth unless you have washed your hands first.    Always cover your mouth when you cough to prevent the spread of germs. It is best to cough into a tissue or your shirt sleeve instead of into your hand. Ask those around you to cover their mouths when they cough. Try to avoid people who have a cold or the flu. If you are sick, stay away from others as much as possible. Follow up with your doctor as directed:  Write down questions you have so you will remember to ask them during your follow-up visits. © Copyright [employer] 2022 Information is for End User's use only and may not be sold, redistributed or otherwise used for commercial purposes. The above information is an  only. It is not intended as medical advice for individual conditions or treatments. Talk to your doctor, nurse or pharmacist before following any medical regimen to see if it is safe and effective for you.

## 2023-07-14 DIAGNOSIS — I10 ESSENTIAL HYPERTENSION: ICD-10-CM

## 2023-07-14 RX ORDER — BENAZEPRIL HYDROCHLORIDE 10 MG/1
TABLET ORAL
Qty: 90 TABLET | Refills: 1 | Status: SHIPPED | OUTPATIENT
Start: 2023-07-14

## 2023-07-14 NOTE — TELEPHONE ENCOUNTER
Left detailed message----- Message from Tonia Rogers MD sent at 7/14/2023  8:17 AM EDT -----  I refilled Rx for Benazepril 20 mg. Please call patient to get blood work done as ordered in 12/2022. Orders in [address] [place] [address].

## 2023-07-22 ENCOUNTER — OFFICE VISIT (OUTPATIENT)
Dept: URGENT CARE | Age: 43
End: 2023-07-22

## 2023-07-22 VITALS
HEART RATE: 110 BPM | DIASTOLIC BLOOD PRESSURE: 94 MMHG | SYSTOLIC BLOOD PRESSURE: 160 MMHG | OXYGEN SATURATION: 97 % | RESPIRATION RATE: 20 BRPM | TEMPERATURE: 97.5 F

## 2023-07-22 DIAGNOSIS — B96.89 BACTERIAL UPPER RESPIRATORY INFECTION: Primary | ICD-10-CM

## 2023-07-22 DIAGNOSIS — J06.9 BACTERIAL UPPER RESPIRATORY INFECTION: Primary | ICD-10-CM

## 2023-07-22 PROCEDURE — G0382 LEV 3 HOSP TYPE B ED VISIT: HCPCS | Performed by: NURSE PRACTITIONER

## 2023-07-22 RX ORDER — AMOXICILLIN AND CLAVULANATE POTASSIUM 875; 125 MG/1; MG/1
1 TABLET, FILM COATED ORAL EVERY 12 HOURS SCHEDULED
Qty: 14 TABLET | Refills: 0 | Status: SHIPPED | OUTPATIENT
Start: 2023-07-22 | End: 2023-07-29

## 2023-07-22 RX ORDER — BENZONATATE 200 MG/1
200 CAPSULE ORAL 3 TIMES DAILY PRN
Qty: 20 CAPSULE | Refills: 0 | Status: SHIPPED | OUTPATIENT
Start: 2023-07-22

## 2023-07-22 NOTE — PROGRESS NOTES
[facility]        NAME: Leighton Roberson is a 39 y.o. male  : 1980    MRN: [id number]  DATE: 2023  TIME: 4:10 PM    Assessment and Plan   Bacterial upper respiratory infection [J06.9, B96.89]  1. Bacterial upper respiratory infection  amoxicillin-clavulanate (AUGMENTIN) 875-125 mg per tablet    benzonatate (TESSALON) 200 MG capsule            Patient Instructions     Take med as prescribed  mucinex OTC prn   Follow up with PCP in 3-5 days. Proceed to  ER if symptoms worsen. Chief Complaint     Chief Complaint   Patient presents with   • Cough     Patient states he has had a dry cough for about a month. He also notes chest and nasal congestion. He was recently put on abx  but states that it didn't seem to help         History of Present Illness       HPI   Reports cough x 1 month. Mostly dry, but with some chest and nose congestion. Was treated with zpak, albuterol inhaler and steroid, about 12 days ago. Says he improved and then improvement stopped. Not a smoker. Hx of HTN and diabetes. Review of Systems   Review of Systems   Constitutional: Negative for fever. HENT: Positive for congestion, postnasal drip, rhinorrhea and sore throat. Respiratory: Positive for cough. Negative for shortness of breath and wheezing. Cardiovascular: Positive for chest pain (from coughing alot). Gastrointestinal: Negative for diarrhea and vomiting. Neurological: Negative for light-headedness.          Current Medications       Current Outpatient Medications:   •  albuterol (ProAir HFA) 90 mcg/act inhaler, Inhale 2 puffs every 4 (four) hours as needed for wheezing or shortness of breath, Disp: 8.5 g, Rfl: 1  •  ALPRAZolam (XANAX) 0.25 mg tablet, Take 1 tablet (0.25 mg total) by mouth 2 (two) times a day as needed for anxiety, Disp: 60 tablet, Rfl: 3  •  amoxicillin-clavulanate (AUGMENTIN) 875-125 mg per tablet, Take 1 tablet by mouth every 12 (twelve) hours for 7 days, Disp: 14 tablet, Rfl: 0  • ascorbic acid (VITAMIN C) 500 MG tablet, Take 500 mg by mouth daily, Disp: , Rfl:   •  benazepril (LOTENSIN) 10 mg tablet, TAKE 1 TABLET BY MOUTH EVERY DAY, Disp: 90 tablet, Rfl: 1  •  benzonatate (TESSALON) 200 MG capsule, Take 1 capsule (200 mg total) by mouth 3 (three) times a day as needed for cough, Disp: 20 capsule, Rfl: 0  •  Blood Glucose Monitoring Suppl (ONE TOUCH ULTRA 2) w/Device KIT, Test blood sugar twice daily, Disp: 1 kit, Rfl: 0  •  cetirizine (ZyrTEC) 10 mg tablet, Take 1 tablet (10 mg total) by mouth daily, Disp: 30 tablet, Rfl: 5  •  Fexofenadine HCl (MUCINEX ALLERGY PO), Take by mouth, Disp: , Rfl:   •  fluticasone (FLONASE) 50 mcg/act nasal spray, 2 sprays into each nostril daily, Disp: 1 Bottle, Rfl: 1  •  glucose blood (OneTouch Ultra) test strip, Test blood sugar twice daily, Disp: 200 each, Rfl: 3  •  Lancets (OneTouch Delica Plus [id number]) MISC, Test blood sugar twice daily, Disp: 200 each, Rfl: 3  •  metFORMIN (GLUCOPHAGE) 500 mg tablet, TAKE 1 TABLET BY MOUTH TWICE A DAY WITH MEALS, Disp: 60 tablet, Rfl: 5  •  methylPREDNISolone 4 MG tablet therapy pack, Use as directed on package, Disp: 1 each, Rfl: 0  •  montelukast (SINGULAIR) 10 mg tablet, TAKE 1 TABLET BY MOUTH EVERYDAY AT BEDTIME, Disp: 90 tablet, Rfl: 2  •  multivitamin (THERAGRAN) TABS, Take 1 tablet by mouth daily  , Disp: , Rfl:   •  Pseudoeph-Doxylamine-DM-APAP (NYQUIL PO), Take by mouth, Disp: , Rfl:     Current Allergies     Allergies as of 07/22/2023 - Reviewed 07/22/2023   Allergen Reaction Noted   • Chocolate - food allergy Sneezing 11/28/2017            The following portions of the patient's history were reviewed and updated as appropriate: allergies, current medications, past family history, past medical history, past social history, past surgical history and problem list.     Past Medical History:   Diagnosis Date   • Allergic    • Anxiety    • Arthritis    • Asthma    • Diabetes ([address])    • GERD (gastroesophageal reflux disease)    • Hypertension    • Obesity        Past Surgical History:   Procedure Laterality Date   • MYRINGOTOMY W/ TUBES         Family History   Problem Relation Age of Onset   • Diabetes Mother    • Hypertension Mother    • Alcohol abuse Father          Medications have been verified. Objective   /94   Pulse (!) 110   Temp 97.5 °F (36.4 °C)   Resp 20   SpO2 97%   No LMP for male patient. Physical Exam     Physical Exam  Constitutional:       Appearance: He is not ill-appearing or diaphoretic. HENT:      Right Ear: Tympanic membrane normal.      Left Ear: Tympanic membrane normal.      Nose: Rhinorrhea present. Mouth/Throat:      Pharynx: No posterior oropharyngeal erythema. Cardiovascular:      Rate and Rhythm: Regular rhythm. Heart sounds: Normal heart sounds. Pulmonary:      Effort: Pulmonary effort is normal.      Breath sounds: No wheezing.       Comments: Congestion in the lungs, moderate severity yes

## 2023-12-09 ENCOUNTER — OFFICE VISIT (OUTPATIENT)
Dept: URGENT CARE | Age: 43
End: 2023-12-09

## 2023-12-09 VITALS
DIASTOLIC BLOOD PRESSURE: 81 MMHG | WEIGHT: 270 LBS | HEIGHT: 75 IN | OXYGEN SATURATION: 97 % | TEMPERATURE: 98.2 F | SYSTOLIC BLOOD PRESSURE: 164 MMHG | HEART RATE: 82 BPM | BODY MASS INDEX: 33.57 KG/M2 | RESPIRATION RATE: 18 BRPM

## 2023-12-09 DIAGNOSIS — K08.89 TOOTHACHE: ICD-10-CM

## 2023-12-09 DIAGNOSIS — J01.00 ACUTE MAXILLARY SINUSITIS, RECURRENCE NOT SPECIFIED: Primary | ICD-10-CM

## 2023-12-09 PROCEDURE — 99213 OFFICE O/P EST LOW 20 MIN: CPT | Performed by: PHYSICIAN ASSISTANT

## 2023-12-09 RX ORDER — AMOXICILLIN AND CLAVULANATE POTASSIUM 875; 125 MG/1; MG/1
1 TABLET, FILM COATED ORAL EVERY 12 HOURS SCHEDULED
Qty: 20 TABLET | Refills: 0 | Status: SHIPPED | OUTPATIENT
Start: 2023-12-09 | End: 2023-12-19

## 2023-12-09 NOTE — PROGRESS NOTES
[facility]        NAME: Rissa Rico is a 39 y.o. male  : 1980    MRN: [id number]  DATE: 2023  TIME: 2:39 PM    /81   Pulse 82   Temp 98.2 °F (36.8 °C)   Resp 18   Ht 6' 3" (1.905 m)   Wt 122 kg (270 lb)   SpO2 97%   BMI 33.75 kg/m²     Assessment and Plan   Acute maxillary sinusitis, recurrence not specified [J01.00]  1. Acute maxillary sinusitis, recurrence not specified  amoxicillin-clavulanate (AUGMENTIN) 875-125 mg per tablet      2. Toothache  amoxicillin-clavulanate (AUGMENTIN) 875-125 mg per tablet            Patient Instructions       Follow up with PCP in 3-5 days. Proceed to  ER if symptoms worsen. Chief Complaint     Chief Complaint   Patient presents with    Dental Pain     Dental pain x 2 days         History of Present Illness       Pt with right upper dental and sinus pain for severl days     Dental Pain   Associated symptoms include sinus pressure. Review of Systems   Review of Systems   Constitutional: Negative. HENT:  Positive for dental problem, sinus pressure and sinus pain. Eyes: Negative. Respiratory: Negative. Cardiovascular: Negative. Gastrointestinal: Negative. Endocrine: Negative. Genitourinary: Negative. Musculoskeletal: Negative. Skin: Negative. Allergic/Immunologic: Negative. Neurological: Negative. Hematological: Negative. Psychiatric/Behavioral: Negative. All other systems reviewed and are negative.         Current Medications       Current Outpatient Medications:     albuterol (ProAir HFA) 90 mcg/act inhaler, Inhale 2 puffs every 4 (four) hours as needed for wheezing or shortness of breath, Disp: 8.5 g, Rfl: 1    ALPRAZolam (XANAX) 0.25 mg tablet, Take 1 tablet (0.25 mg total) by mouth 2 (two) times a day as needed for anxiety, Disp: 60 tablet, Rfl: 3    amoxicillin-clavulanate (AUGMENTIN) 875-125 mg per tablet, Take 1 tablet by mouth every 12 (twelve) hours for 10 days, Disp: 20 tablet, Rfl: 0    metFORMIN (GLUCOPHAGE) 500 mg tablet, TAKE 1 TABLET BY MOUTH TWICE A DAY WITH MEALS, Disp: 60 tablet, Rfl: 5    montelukast (SINGULAIR) 10 mg tablet, TAKE 1 TABLET BY MOUTH EVERYDAY AT BEDTIME, Disp: 90 tablet, Rfl: 2    multivitamin (THERAGRAN) TABS, Take 1 tablet by mouth daily  , Disp: , Rfl:     ascorbic acid (VITAMIN C) 500 MG tablet, Take 500 mg by mouth daily (Patient not taking: Reported on 12/9/2023), Disp: , Rfl:     benazepril (LOTENSIN) 10 mg tablet, TAKE 1 TABLET BY MOUTH EVERY DAY (Patient not taking: Reported on 12/9/2023), Disp: 90 tablet, Rfl: 1    benzonatate (TESSALON) 200 MG capsule, Take 1 capsule (200 mg total) by mouth 3 (three) times a day as needed for cough (Patient not taking: Reported on 12/9/2023), Disp: 20 capsule, Rfl: 0    Blood Glucose Monitoring Suppl (ONE TOUCH ULTRA 2) w/Device KIT, Test blood sugar twice daily, Disp: 1 kit, Rfl: 0    cetirizine (ZyrTEC) 10 mg tablet, Take 1 tablet (10 mg total) by mouth daily, Disp: 30 tablet, Rfl: 5    Fexofenadine HCl (MUCINEX ALLERGY PO), Take by mouth, Disp: , Rfl:     fluticasone (FLONASE) 50 mcg/act nasal spray, 2 sprays into each nostril daily, Disp: 1 Bottle, Rfl: 1    glucose blood (OneTouch Ultra) test strip, Test blood sugar twice daily, Disp: 200 each, Rfl: 3    Lancets (OneTouch Delica Plus [id number]) MISC, Test blood sugar twice daily, Disp: 200 each, Rfl: 3    methylPREDNISolone 4 MG tablet therapy pack, Use as directed on package (Patient not taking: Reported on 12/9/2023), Disp: 1 each, Rfl: 0    Pseudoeph-Doxylamine-DM-APAP (NYQUIL PO), Take by mouth, Disp: , Rfl:     Current Allergies     Allergies as of 12/09/2023 - Reviewed 12/09/2023   Allergen Reaction Noted    Chocolate - food allergy Sneezing 11/28/2017            The following portions of the patient's history were reviewed and updated as appropriate: allergies, current medications, past family history, past medical history, past social history, past surgical history and problem list.     Past Medical History:   Diagnosis Date    Allergic     Anxiety     Arthritis     Asthma     Diabetes ([address])     GERD (gastroesophageal reflux disease)     Hypertension     Obesity        Past Surgical History:   Procedure Laterality Date    MYRINGOTOMY W/ TUBES         Family History   Problem Relation Age of Onset    Diabetes Mother     Hypertension Mother     Alcohol abuse Father          Medications have been verified. Objective   /81   Pulse 82   Temp 98.2 °F (36.8 °C)   Resp 18   Ht 6' 3" (1.905 m)   Wt 122 kg (270 lb)   SpO2 97%   BMI 33.75 kg/m²        Physical Exam     Physical Exam  Vitals and nursing note reviewed. Constitutional:       Appearance: Normal appearance. He is normal weight. Comments: No facial swelling    HENT:      Head: Normocephalic and atraumatic. Right Ear: Tympanic membrane, ear canal and external ear normal.      Left Ear: Tympanic membrane, ear canal and external ear normal.      Nose: No congestion. Mouth/Throat:      Mouth: Mucous membranes are moist.      Pharynx: Oropharynx is clear. Comments: Right upper severe dental decay with gum tenderness  Right max sinus tenderness to palp   Eyes:      Extraocular Movements: Extraocular movements intact. Conjunctiva/sclera: Conjunctivae normal.      Pupils: Pupils are equal, round, and reactive to light. Cardiovascular:      Rate and Rhythm: Normal rate and regular rhythm. Pulmonary:      Effort: Pulmonary effort is normal.      Breath sounds: Normal breath sounds. Abdominal:      General: Abdomen is flat. Bowel sounds are normal.      Palpations: Abdomen is soft. Musculoskeletal:         General: Normal range of motion. Cervical back: Normal range of motion. Neurological:      Mental Status: He is alert.

## 2024-03-11 ENCOUNTER — TELEPHONE (OUTPATIENT)
Dept: FAMILY MEDICINE CLINIC | Facility: CLINIC | Age: 44
End: 2024-03-11

## 2024-03-11 NOTE — TELEPHONE ENCOUNTER
[facility] continues to request status update via fax, for this pt post RTW 5/17/23 after a work related injury 6/17/22. This injury required more time to recover according to the last disability form filled out 2/8/23.    Pt has not been seen by  since 2/15/23.    [facility] made aware of above. They will make note and stop sending fax requests for information.

## 2025-02-25 ENCOUNTER — TELEPHONE (OUTPATIENT)
Dept: FAMILY MEDICINE CLINIC | Facility: CLINIC | Age: 45
End: 2025-02-25

## 2025-06-10 ENCOUNTER — TELEPHONE (OUTPATIENT)
Dept: FAMILY MEDICINE CLINIC | Facility: CLINIC | Age: 45
End: 2025-06-10

## 2025-06-10 NOTE — TELEPHONE ENCOUNTER
Lmom to call/be seen for a physical-last visit 3/9/23.  Also asked patient to notify us if he no longer wants to be a patient of the practice.